# Patient Record
Sex: FEMALE | Race: WHITE | NOT HISPANIC OR LATINO | Employment: FULL TIME | URBAN - METROPOLITAN AREA
[De-identification: names, ages, dates, MRNs, and addresses within clinical notes are randomized per-mention and may not be internally consistent; named-entity substitution may affect disease eponyms.]

---

## 2024-01-24 ENCOUNTER — OFFICE VISIT (OUTPATIENT)
Dept: FAMILY MEDICINE CLINIC | Facility: CLINIC | Age: 54
End: 2024-01-24
Payer: COMMERCIAL

## 2024-01-24 VITALS
TEMPERATURE: 97.9 F | BODY MASS INDEX: 51.21 KG/M2 | HEIGHT: 63 IN | HEART RATE: 87 BPM | RESPIRATION RATE: 18 BRPM | OXYGEN SATURATION: 97 % | SYSTOLIC BLOOD PRESSURE: 140 MMHG | WEIGHT: 289 LBS | DIASTOLIC BLOOD PRESSURE: 94 MMHG

## 2024-01-24 DIAGNOSIS — E66.01 MORBID OBESITY WITH BMI OF 45.0-49.9, ADULT (HCC): ICD-10-CM

## 2024-01-24 DIAGNOSIS — K21.9 GASTROESOPHAGEAL REFLUX DISEASE WITHOUT ESOPHAGITIS: ICD-10-CM

## 2024-01-24 DIAGNOSIS — G89.29 CHRONIC LEFT-SIDED THORACIC BACK PAIN: Primary | ICD-10-CM

## 2024-01-24 DIAGNOSIS — E03.8 HYPOTHYROIDISM DUE TO HASHIMOTO'S THYROIDITIS: ICD-10-CM

## 2024-01-24 DIAGNOSIS — E06.3 HYPOTHYROIDISM DUE TO HASHIMOTO'S THYROIDITIS: ICD-10-CM

## 2024-01-24 DIAGNOSIS — M54.6 CHRONIC LEFT-SIDED THORACIC BACK PAIN: Primary | ICD-10-CM

## 2024-01-24 PROCEDURE — 99213 OFFICE O/P EST LOW 20 MIN: CPT | Performed by: NURSE PRACTITIONER

## 2024-01-24 RX ORDER — OMEPRAZOLE 20 MG/1
CAPSULE, DELAYED RELEASE ORAL
COMMUNITY

## 2024-01-24 NOTE — PROGRESS NOTES
FAMILY PRACTICE OFFICE VISIT       NAME: Екатерина Camacho  AGE: 53 y.o. SEX: female       : 1970        MRN: [id number]    DATE: 2024  TIME: 7:38 AM    Assessment and Plan   1. Chronic left-sided thoracic back pain  -     MRI thoracic spine wo contrast; Future; Expected date: 2024    2. Gastroesophageal reflux disease without esophagitis  -     Comprehensive metabolic panel; Future; Expected date: 2024  -     CBC and differential; Future; Expected date: 2024  -     TSH, 3rd generation with Free T4 reflex; Future; Expected date: 2024  -     Lipid Panel with Direct LDL reflex; Future    3. Hypothyroidism due to Hashimoto's thyroiditis  -     Comprehensive metabolic panel; Future; Expected date: 2024  -     CBC and differential; Future; Expected date: 2024  -     TSH, 3rd generation with Free T4 reflex; Future; Expected date: 2024  -     Lipid Panel with Direct LDL reflex; Future    4. Morbid obesity with BMI of 45.0-49.9, adult (HCC)  -     Comprehensive metabolic panel; Future; Expected date: 2024  -     CBC and differential; Future; Expected date: 2024  -     TSH, 3rd generation with Free T4 reflex; Future; Expected date: 2024  -     Lipid Panel with Direct LDL reflex; Future                 Chief Complaint     Chief Complaint   Patient presents with    Back Pain     Pt been seeing for upper back pain       History of Present Illness   Екатерина Camacho is a 53 y.o.-year-old female who is here for c/o left sided thoracic back pain  Has been happening for months  Feels sensation of burning pain as well  No known trauma or injury  Has seen chiropractor for this and no real relief  Would like labs done   Last mri showed disc disease  Has appt with pain management end of February for this      Review of Systems   Review of Systems   Constitutional:  Negative for fatigue and fever.   HENT:  Negative for congestion, nosebleeds and postnasal drip.   "  Cardiovascular:  Negative for chest pain and palpitations.   Gastrointestinal:  Negative for constipation, diarrhea, nausea and vomiting.   Musculoskeletal:  Positive for back pain. Negative for joint swelling.   Skin:  Negative for rash.   Neurological:  Negative for dizziness, light-headedness and headaches.   Hematological:  Negative for adenopathy.   Psychiatric/Behavioral:  Negative for dysphoric mood and sleep disturbance. The patient is not nervous/anxious.        Active Problem List     Patient Active Problem List   Diagnosis    Leg edema    Herpes simplex type 1 infection    Chronic bilateral low back pain without sciatica    Mild intermittent asthma    Gastroesophageal reflux disease without esophagitis    Morbid obesity with BMI of 45.0-49.9, adult ([facility])    Family history of colon cancer    Hypothyroidism due to Hashimoto's thyroiditis    Psoriasis    Ganglion of tendon sheath    Costovertebral angle pain    Herniated thoracic disc without myelopathy    Cold sore    History of COVID-19    Irritant contact dermatitis due to other agents    Chronic left-sided thoracic back pain         Past Medical History:  Past Medical History:   Diagnosis Date    Acute pain of right shoulder     [id number] RESOLVED//seen by pain management    Allergic     Anesthesia     \"sometimes wakes up before procedure done ie, with MRI and EGD\"    Anxiety     [id number]  LAST ASSESSED    Arthritis     left knee    Asthma     allergy induced    Bulging of intervertebral disc between L4 and L5     Bursitis of right knee     better now    Depression     Ear infection     left PCP placed pt on antibiotic and ear drops    Elevated AST (SGOT)     [id number] RESOLVED    GERD (gastroesophageal reflux disease)     Goiter     [id number] RESOLVED//pt denies states has \"inflamed thyroid\"    Hiatal hernia     Hypothyroidism     [id number] RESOLVED//not on meds    Lipoma     left scapular area    Lump of axilla, right     [id number] RESOLVED marker " "clip in rt breast    Obstructive sleep apnea     [id number] RESOLVED    Psoriasis     Rash     under bilat breasts and folds of abdomen,, pt reports Dr Shahana vela and dermatologist is treating/pt using ketoconazole cream as directed    Shortness of breath     \"with exertion\"    Use of cane as ambulatory aid     occas       Past Surgical History:  Past Surgical History:   Procedure Laterality Date    CARPAL TUNNEL RELEASE Bilateral     COLONOSCOPY      EGD      HYSTERECTOMY  12/30/2010    WITH ENDOMETERIAL ABLATION    KNEE SURGERY Left     NERVE BLOCK      PERIPH NERVE BLOCK WRIST MEDIAN W/LOCAL STEROID INFILTRAIT    PLANTAR FASCIA RELEASE Bilateral 2009, 2020 R    June 2020 on R    SC COLONOSCOPY FLX DX W/COLLJ SPEC WHEN PFRMD N/A 08/02/2018    Procedure: EGD AND COLONOSCOPY;  Surgeon: Mulugeta Gill MD;  Location: [facility];  Service: Gastroenterology    SC EXC B9 LESION MRGN XCP SK TG T/A/L 0.5 CM/< N/A 3/2/2023    Procedure: EXCISION WIDE LESION LEFT UPPER BACK;  Surgeon: Maya Chavez MD;  Location: [facility];  Service: General    SC FASCIECTOMY PLANTAR FASCIA PARTIAL SPX Right 06/09/2020    Procedure: RELEASE FASCIA PLANTAR/FASCIOTOMY ENDOSCOPIC (EPF);  Surgeon: Viraj Harkins DPM;  Location: [facility];  Service: Podiatry    SC RELEASE TARSAL TUNNEL Right 08/11/2020    Procedure: RELEASE TARSAL TUNNEL;  Surgeon: Viraj Harkins DPM;  Location[facility];  Service: Podiatry    TONSILLECTOMY AND ADENOIDECTOMY         Family History:  Family History   Problem Relation Age of Onset    Cirrhosis Mother     Hypertension Father     Nephrolithiasis Sister     Fibroids Sister     Anxiety disorder Daughter     Asthma Daughter     Heart disease Maternal Grandfather     Ovarian cancer Paternal Grandmother     Obesity Family     Uterine cancer Maternal Aunt     Fibroids Sister     Fibroids Sister     Ulcerative colitis Son        Social History:  Social History     Socioeconomic History    Marital status:  "     Spouse name: Not on file    Number of children: Not on file    Years of education: Not on file    Highest education level: Not on file   Occupational History    Not on file   Tobacco Use    Smoking status: Never    Smokeless tobacco: Never   Vaping Use    Vaping status: Never Used   Substance and Sexual Activity    Alcohol use: Yes     Comment: very rare (1 drink once per month - if that)    Drug use: No    Sexual activity: Not on file   Other Topics Concern    Not on file   Social History Narrative    DAILY COFFEE CONSUMPTION     Social Determinants of Health     Financial Resource Strain: Not on file   Food Insecurity: Not on file   Transportation Needs: Not on file   Physical Activity: Not on file   Stress: Not on file   Social Connections: Not on file   Intimate Partner Violence: Not on file   Housing Stability: Not on file       Objective     Vitals:    01/24/24 1519   BP: 140/94   Pulse: 87   Resp: 18   Temp: 97.9 °F (36.6 °C)   SpO2: 97%     Wt Readings from Last 3 Encounters:   01/24/24 131 kg (289 lb)   12/06/23 131 kg (289 lb)   11/07/23 133 kg (293 lb)       Physical Exam  Vitals and nursing note reviewed.   Constitutional:       Appearance: Normal appearance.   HENT:      Head: Normocephalic and atraumatic.   Eyes:      Conjunctiva/sclera: Conjunctivae normal.   Cardiovascular:      Rate and Rhythm: Normal rate and regular rhythm.      Heart sounds: Normal heart sounds.   Pulmonary:      Breath sounds: Normal breath sounds.   Musculoskeletal:         General: Normal range of motion.        Arms:       Cervical back: Normal range of motion and neck supple.      Comments: Pain and tenderness to left thoracic spine  No mass  No rash     Skin:     General: Skin is warm and dry.      Capillary Refill: Capillary refill takes less than 2 seconds.   Neurological:      Mental Status: She is alert and oriented to person, place, and time.   Psychiatric:         Mood and Affect: Mood normal.         Behavior:  "Behavior normal.         Pertinent Laboratory/Diagnostic Studies:  Lab Results   Component Value Date    GLUCOSE 106 12/12/2015    BUN 16 07/16/2023    CREATININE 0.86 07/16/2023    CALCIUM 9.6 07/16/2023     12/12/2015    K 3.8 07/16/2023    CO2 27 07/16/2023     07/16/2023     Lab Results   Component Value Date    ALT 17 07/16/2023    AST 18 07/16/2023    ALKPHOS 66 07/16/2023    BILITOT 0.52 12/12/2015       Lab Results   Component Value Date    WBC 10.00 07/16/2023    HGB 14.0 07/16/2023    HCT 43.6 07/16/2023    MCV 87 07/16/2023     07/16/2023       No results found for: \"TSH\"    Lab Results   Component Value Date    CHOL 184 12/12/2015     Lab Results   Component Value Date    TRIG 113 05/13/2023     Lab Results   Component Value Date    HDL 73 05/13/2023     Lab Results   Component Value Date    LDLCALC 96 05/13/2023     Lab Results   Component Value Date    HGBA1C 6.2 (H) 07/28/2014       Results for orders placed or performed in visit on 12/22/23   Poct Covid 19 Rapid Antigen Test   Result Value Ref Range    POCT SARS-CoV-2 Ag Negative Negative    VALID CONTROL Valid        Orders Placed This Encounter   Procedures    MRI thoracic spine wo contrast    Comprehensive metabolic panel    CBC and differential    TSH, 3rd generation with Free T4 reflex    Lipid Panel with Direct LDL reflex       ALLERGIES:  Allergies   Allergen Reactions    Chlorhexidine Rash       Current Medications     Current Outpatient Medications   Medication Sig Dispense Refill    acyclovir (ZOVIRAX) 5 % ointment Apply topically every 3 (three) hours 30 g 3    albuterol (2.5 mg/3 mL) 0.083 % nebulizer solution Take 3 mL (2.5 mg total) by nebulization every 4 (four) hours as needed for wheezing or shortness of breath 75 mL 1    budesonide-formoterol (SYMBICORT) 80-4.5 MCG/ACT inhaler Inhale 2 puffs 2 (two) times a day 10.2 g 3    guselkumab (Tremfya) subcutaneous injection       levothyroxine 150 mcg tablet TAKE 1 TABLET " BY MOUTH EVERY DAY 90 tablet 1    montelukast (SINGULAIR) 10 mg tablet TAKE 1 TABLET BY MOUTH DAILY AT BEDTIME 90 tablet 1    predniSONE 10 mg tablet Take 3 tablets daily for 3 days, then 2t daily for 3d then 1t daily for 3d 18 tablet 0    Proventil  (90 Base) MCG/ACT inhaler Inhale 2 puffs every 4 (four) hours as needed for wheezing 20.1 g 5    valACYclovir (VALTREX) 500 mg tablet TAKE 1 TABLET (500 MG TOTAL) BY MOUTH DAILY. 90 tablet 1    furosemide (LASIX) 20 mg tablet TAKE 1 TABLET BY MOUTH DAILY AS NEEDED (EDEMA). (Patient not taking: Reported on 1/24/2024) 90 tablet 1    Klor-Con M20 20 MEQ tablet TAKE 1 TABLET BY MOUTH EVERY DAY (Patient not taking: Reported on 8/1/2023) 90 tablet 2    omeprazole (PriLOSEC) 20 mg delayed release capsule TK ONE C PO QD GEF PRILOSEC      triamcinolone (KENALOG) 0.1 % cream Apply topically 2 (two) times a day (Patient not taking: Reported on 1/24/2024) 30 g 1     No current facility-administered medications for this visit.         Health Maintenance     Health Maintenance   Topic Date Due    Zoster Vaccine (1 of 2) Never done    DTaP,Tdap,and Td Vaccines (2 - Td or Tdap) 08/30/2022    Influenza Vaccine (1) 09/01/2023    COVID-19 Vaccine (2 - 2023-24 season) 09/01/2023    Annual Physical  08/01/2024    Breast Cancer Screening: Mammogram  08/22/2024    Depression Screening  01/24/2025    Colorectal Cancer Screening  08/01/2026    HIV Screening  Completed    Hepatitis C Screening  Completed    HIB Vaccine  Aged Out    IPV Vaccine  Aged Out    Hepatitis A Vaccine  Aged Out    Meningococcal ACWY Vaccine  Aged Out    HPV Vaccine  Aged Out    Pneumococcal Vaccine: Pediatrics (0 to 5 Years) and At-Risk Patients (6 to 64 Years)  Discontinued     Immunization History   Administered Date(s) Administered    COVID-19 [employer]J ([employer]) vaccine 0.5 mL 03/19/2021    INFLUENZA 12/22/2015, 10/17/2016, 12/04/2017, 11/12/2018    Influenza Quadrivalent Preservative Free 3 years and older IM  12/31/2014, 10/17/2016, 12/04/2017    Influenza Quadrivalent, 6-35 Months IM 12/30/2014, 12/22/2015    Influenza, injectable, quadrivalent, preservative free 0.5 mL 10/31/2019    Influenza, recombinant, quadrivalent,injectable, preservative free 11/12/2018, 12/08/2020, 12/09/2021, 11/16/2022    Influenza, seasonal, injectable 10/20/2012, 12/08/2021    Tdap 08/30/2012          THEO Lewis

## 2024-01-25 PROBLEM — R07.89 OTHER CHEST PAIN: Status: RESOLVED | Noted: 2018-04-19 | Resolved: 2024-01-25

## 2024-01-25 PROBLEM — M79.89 LEFT LEG SWELLING: Status: RESOLVED | Noted: 2023-06-09 | Resolved: 2024-01-25

## 2024-01-25 PROBLEM — R10.13 EPIGASTRIC PAIN: Status: RESOLVED | Noted: 2023-08-01 | Resolved: 2024-01-25

## 2024-01-25 PROBLEM — M54.6 CHRONIC RIGHT-SIDED THORACIC BACK PAIN: Status: ACTIVE | Noted: 2024-01-25

## 2024-01-25 PROBLEM — M79.605 PAIN OF LEFT LOWER EXTREMITY: Status: RESOLVED | Noted: 2023-06-09 | Resolved: 2024-01-25

## 2024-01-25 PROBLEM — D17.1 LIPOMA OF TORSO: Status: RESOLVED | Noted: 2021-07-28 | Resolved: 2024-01-25

## 2024-01-25 PROBLEM — N64.4 BREAST PAIN, LEFT: Status: RESOLVED | Noted: 2023-06-09 | Resolved: 2024-01-25

## 2024-01-25 PROBLEM — F41.9 ANXIETY: Status: RESOLVED | Noted: 2018-06-18 | Resolved: 2024-01-25

## 2024-01-25 PROBLEM — G89.29 CHRONIC RIGHT-SIDED THORACIC BACK PAIN: Status: ACTIVE | Noted: 2024-01-25

## 2024-02-12 ENCOUNTER — ANESTHESIA EVENT (OUTPATIENT)
Dept: RADIOLOGY | Facility: HOSPITAL | Age: 54
End: 2024-02-12

## 2024-02-12 PROBLEM — Z90.710 HISTORY OF HYSTERECTOMY: Status: ACTIVE | Noted: 2024-02-12

## 2024-02-12 NOTE — ANESTHESIA PREPROCEDURE EVALUATION
Procedure:  MRI THORACIC SPINE WO CONTRAST    Relevant Problems   CARDIO   (+) Chronic left-sided thoracic back pain      ENDO   (+) Hypothyroidism due to Hashimoto's thyroiditis      GI/HEPATIC   (+) Gastroesophageal reflux disease without esophagitis      GYN   (+) History of hysterectomy      MUSCULOSKELETAL   (+) Chronic bilateral low back pain without sciatica   (+) Chronic left-sided thoracic back pain   (+) Costovertebral angle pain      NEURO/PSYCH   (+) Anxiety   (+) Chronic bilateral low back pain without sciatica   (+) Chronic left-sided thoracic back pain      PULMONARY   (+) Mild intermittent asthma      Musculoskeletal and Integument   (+) Herniated thoracic disc without myelopathy      Other   (+) Morbid obesity with BMI of 45.0-49.9, adult (HCC)        Physical Exam    Airway       Dental       Cardiovascular      Pulmonary      Other Findings  post-pubertal.      Anesthesia Plan  ASA Score- 3     Anesthesia Type- IV sedation with anesthesia with ASA Monitors.         Additional Monitors:     Airway Plan:     Comment: Pre-treat with gylco 0.2mg, Versed, Ketamine and light propofol infusion recommended.       Plan Factors-    Chart reviewed.        Patient is not a current smoker.              Induction- intravenous.    Postoperative Plan-     Informed Consent-   I personally reviewed this patient with the CRNA. Discussed and agreed on the Anesthesia Plan with the CRNA..

## 2024-02-14 ENCOUNTER — HOSPITAL ENCOUNTER (OUTPATIENT)
Dept: RADIOLOGY | Facility: HOSPITAL | Age: 54
Discharge: HOME | End: 2024-02-14
Payer: COMMERCIAL

## 2024-02-14 ENCOUNTER — ANESTHESIA (OUTPATIENT)
Dept: RADIOLOGY | Facility: HOSPITAL | Age: 54
End: 2024-02-14

## 2024-02-14 VITALS
HEART RATE: 73 BPM | TEMPERATURE: 98.6 F | DIASTOLIC BLOOD PRESSURE: 88 MMHG | RESPIRATION RATE: 16 BRPM | OXYGEN SATURATION: 96 % | SYSTOLIC BLOOD PRESSURE: 148 MMHG

## 2024-02-14 DIAGNOSIS — G89.29 CHRONIC LEFT-SIDED THORACIC BACK PAIN: ICD-10-CM

## 2024-02-14 DIAGNOSIS — M54.6 CHRONIC LEFT-SIDED THORACIC BACK PAIN: ICD-10-CM

## 2024-02-14 PROCEDURE — 72146 MRI CHEST SPINE W/O DYE: CPT

## 2024-02-14 PROCEDURE — G1004 CDSM NDSC: HCPCS

## 2024-02-14 RX ORDER — KETAMINE HCL IN NACL, ISO-OSM 100MG/10ML
SYRINGE (ML) INJECTION AS NEEDED
Status: DISCONTINUED | OUTPATIENT
Start: 2024-02-14 | End: 2024-02-14

## 2024-02-14 RX ORDER — MIDAZOLAM HYDROCHLORIDE 2 MG/2ML
INJECTION, SOLUTION INTRAMUSCULAR; INTRAVENOUS AS NEEDED
Status: DISCONTINUED | OUTPATIENT
Start: 2024-02-14 | End: 2024-02-14

## 2024-02-14 RX ORDER — SODIUM CHLORIDE 9 MG/ML
INJECTION, SOLUTION INTRAVENOUS CONTINUOUS PRN
Status: DISCONTINUED | OUTPATIENT
Start: 2024-02-14 | End: 2024-02-14

## 2024-02-14 RX ORDER — PROPOFOL 10 MG/ML
INJECTION, EMULSION INTRAVENOUS CONTINUOUS PRN
Status: DISCONTINUED | OUTPATIENT
Start: 2024-02-14 | End: 2024-02-14

## 2024-02-14 RX ORDER — SODIUM CHLORIDE, SODIUM LACTATE, POTASSIUM CHLORIDE, CALCIUM CHLORIDE 600; 310; 30; 20 MG/100ML; MG/100ML; MG/100ML; MG/100ML
75 INJECTION, SOLUTION INTRAVENOUS CONTINUOUS
Status: DISCONTINUED | OUTPATIENT
Start: 2024-02-14 | End: 2024-02-15 | Stop reason: HOSPADM

## 2024-02-14 RX ADMIN — MIDAZOLAM 2 MG: 1 INJECTION INTRAMUSCULAR; INTRAVENOUS at 12:37

## 2024-02-14 RX ADMIN — SODIUM CHLORIDE, SODIUM LACTATE, POTASSIUM CHLORIDE, AND CALCIUM CHLORIDE 75 ML/HR: .6; .31; .03; .02 INJECTION, SOLUTION INTRAVENOUS at 11:57

## 2024-02-14 RX ADMIN — Medication 30 MG: at 12:37

## 2024-02-14 RX ADMIN — PROPOFOL 50 MCG/KG/MIN: 10 INJECTION, EMULSION INTRAVENOUS at 12:41

## 2024-02-14 RX ADMIN — SODIUM CHLORIDE: 0.9 INJECTION, SOLUTION INTRAVENOUS at 11:56

## 2024-02-14 NOTE — ANESTHESIA POSTPROCEDURE EVALUATION
Post-Op Assessment Note    CV Status:  Stable  Pain Score: 0    Pain management: adequate       Mental Status:  Sleepy and arousable   Hydration Status:  Euvolemic   PONV Controlled:  Controlled   Airway Patency:  Patent     Post Op Vitals Reviewed: Yes    No anethesia notable event occurred.    Staff: Anesthesiologist, CRNA               /81 (02/14/24 1320)    Temp      Pulse 79 (02/14/24 1320)   Resp 12 (02/14/24 1320)    SpO2 100 % (02/14/24 1320)

## 2024-02-20 DIAGNOSIS — E06.3 HYPOTHYROIDISM DUE TO HASHIMOTO'S THYROIDITIS: ICD-10-CM

## 2024-02-20 DIAGNOSIS — E03.8 HYPOTHYROIDISM DUE TO HASHIMOTO'S THYROIDITIS: ICD-10-CM

## 2024-02-20 RX ORDER — LEVOTHYROXINE SODIUM 0.15 MG/1
150 TABLET ORAL DAILY
Qty: 90 TABLET | Refills: 1 | Status: SHIPPED | OUTPATIENT
Start: 2024-02-20

## 2024-02-29 ENCOUNTER — CONSULT (OUTPATIENT)
Dept: PAIN MEDICINE | Facility: CLINIC | Age: 54
End: 2024-02-29
Payer: COMMERCIAL

## 2024-02-29 ENCOUNTER — HOSPITAL ENCOUNTER (OUTPATIENT)
Dept: RADIOLOGY | Facility: HOSPITAL | Age: 54
Discharge: HOME | End: 2024-02-29
Payer: COMMERCIAL

## 2024-02-29 VITALS
RESPIRATION RATE: 16 BRPM | WEIGHT: 291 LBS | HEIGHT: 63 IN | SYSTOLIC BLOOD PRESSURE: 133 MMHG | BODY MASS INDEX: 51.56 KG/M2 | DIASTOLIC BLOOD PRESSURE: 94 MMHG | HEART RATE: 97 BPM

## 2024-02-29 DIAGNOSIS — R10.32 LEFT INGUINAL PAIN: ICD-10-CM

## 2024-02-29 DIAGNOSIS — M25.552 PAIN OF LEFT HIP: ICD-10-CM

## 2024-02-29 DIAGNOSIS — M25.552 PAIN OF LEFT HIP: Primary | ICD-10-CM

## 2024-02-29 PROCEDURE — 73502 X-RAY EXAM HIP UNI 2-3 VIEWS: CPT

## 2024-02-29 PROCEDURE — 99204 OFFICE O/P NEW MOD 45 MIN: CPT | Performed by: PHYSICAL MEDICINE & REHABILITATION

## 2024-02-29 PROCEDURE — 72220 X-RAY EXAM SACRUM TAILBONE: CPT

## 2024-02-29 NOTE — PROGRESS NOTES
Assessment  1. Pain of left hip    2. Left inguinal pain        Plan  Ms. Camacho is a pleasant 53-year-old female presents to [facility] for initial evaluation regarding isolated mid thoracic pain as well as low back and groin pain.  She has a known history of a lipoma resection that she reports burning, throbbing, neuropathic type pain left-sided thoracic paraspinals that started after the resection.  During today's evaluation she is demonstrating isolated thoracic radiculopathy and neuropathic pain with a known MRI thoracic spine results demonstrating T7-T8 left paracentral disc protrusion likely contributing and questionable if associated with the previous resection.  In regards to the low back and groin pain she is demonstrating suspected hip pathology but no conservative measures have been done.  At this time we will  1.  Placed the patient in a formal physical therapy program x 4 weeks or longer if needed  2.  Order updated hip and sacral x-rays  3.  Will reach out to compound pharmacy to see if they would be able to build a compound topical ointment for her to try over the affected area.  We did discuss thoracic epidurals, medial branch blocks and subsequent RFA but for now we will hold off on interventional approaches to manage her pain.    Spoke to [facility] pharmacy and will plan for compound agent  Ketoprofen 10%,  lidocaine 5%, cyclobenzaprine 2%, Gabapentin 6% and amitryptiline 3%. 2 grams to be used up to 4 times a day    My impressions and treatment recommendations were discussed in detail with the patient who verbalized understanding and had no further questions.  Discharge instructions were provided. I personally saw and examined the patient and I agree with the above discussed plan of care.    Orders Placed This Encounter   Procedures    XR hip/pelv 2-3 vws left if performed     Standing Status:   Future     Standing Expiration Date:   2/29/2028     Scheduling  Instructions:      Bring along any outside films relating to this procedure.           Order Specific Question:   Is the patient pregnant?     Answer:   No    XR sacrum and coccyx     Standing Status:   Future     Standing Expiration Date:   2/29/2028     Scheduling Instructions:      Bring along any outside films relating to this procedure.           Order Specific Question:   Is the patient pregnant?     Answer:   No    Ambulatory referral to Physical Therapy     Standing Status:   Future     Standing Expiration Date:   2/28/2025     Referral Priority:   Routine     Referral Type:   Physical Therapy     Referral Reason:   Specialty Services Required     Requested Specialty:   Physical Therapy     Number of Visits Requested:   1     Expiration Date:   2/28/2025     No orders of the defined types were placed in this encounter.      History of Present Illness    Екатерина Camacho is a 53 y.o. female presents to [facility] for initial evaluation regarding 3 months duration of isolated left shoulder and left low back, buttock and hip pain.  Denies any significant inciting event or recent trauma.  Today reports moderate to severe pain rated 8 out of 10 and interfere with activities.  Pain is nearly constant 60 to 95% of the time that is present throughout the day and night.  Describes symptoms of burning, shooting, sharp, throbbing, pins-and-needles, cutting pain.  Denies any significant upper or lower extremity weakness.  Does not use any durable medical record for ambulation.  Symptoms are worse with exercise.  Denies any relief with physical therapy but does report moderate relief with heat and TENS unit.  Denies smoking or marijuana use.  Admits to alcohol twice per month.  Currently taking Motrin with no relief.  Presents today for initial evaluation.    I have personally reviewed and/or updated the patient's past medical history, past surgical history, family history, social history, current  "medications, allergies, and vital signs today.     Review of Systems   Constitutional:  Negative for fever and unexpected weight change.   HENT:  Negative for trouble swallowing.    Eyes:  Negative for visual disturbance.   Respiratory:  Negative for shortness of breath and wheezing.    Cardiovascular:  Negative for chest pain and palpitations.   Gastrointestinal:  Negative for constipation, diarrhea, nausea and vomiting.   Endocrine: Negative for cold intolerance, heat intolerance and polydipsia.   Genitourinary:  Negative for difficulty urinating and frequency.   Musculoskeletal:  Positive for back pain, gait problem and joint swelling. Negative for arthralgias and myalgias.   Skin:  Negative for rash.   Neurological:  Negative for dizziness, seizures, syncope, weakness and headaches.   Hematological:  Does not bruise/bleed easily.   Psychiatric/Behavioral:  Negative for dysphoric mood.    All other systems reviewed and are negative.      Patient Active Problem List   Diagnosis    Leg edema    Herpes simplex type 1 infection    Chronic bilateral low back pain without sciatica    Mild intermittent asthma    Gastroesophageal reflux disease without esophagitis    Anxiety    Morbid obesity with BMI of 45.0-49.9, adult (HCC)    Family history of colon cancer    Hypothyroidism due to Hashimoto's thyroiditis    Psoriasis    Ganglion of tendon sheath    Costovertebral angle pain    Herniated thoracic disc without myelopathy    Cold sore    History of COVID-19    Irritant contact dermatitis due to other agents    Chronic left-sided thoracic back pain    History of hysterectomy       Past Medical History:   Diagnosis Date    Acute pain of right shoulder     [id number] RESOLVED//seen by pain management    Allergic     Anesthesia     \"sometimes wakes up before procedure done ie, with MRI and EGD\"    Anxiety     [id number]  LAST ASSESSED    Arthritis     left knee    Asthma     allergy induced    Bulging of intervertebral disc " "between L4 and L5     Bursitis of right knee     better now    Depression     Ear infection     left PCP placed pt on antibiotic and ear drops    Elevated AST (SGOT)     [id number] RESOLVED    GERD (gastroesophageal reflux disease)     Goiter     [id number] RESOLVED//pt denies states has \"inflamed thyroid\"    Hiatal hernia     Hypothyroidism     [id number] RESOLVED//not on meds    Lipoma     left scapular area    Lump of axilla, right     [id number] RESOLVED marker clip in rt breast    Obstructive sleep apnea     [id number] RESOLVED    Psoriasis     Rash     under bilat breasts and folds of abdomen,, pt reports Dr Harkins aware and dermatologist is treating/pt using ketoconazole cream as directed    Shortness of breath     \"with exertion\"    Use of cane as ambulatory aid     occas       Past Surgical History:   Procedure Laterality Date    CARPAL TUNNEL RELEASE Bilateral     COLONOSCOPY      EGD      HYSTERECTOMY  12/30/2010    WITH ENDOMETERIAL ABLATION    KNEE SURGERY Left     NERVE BLOCK      PERIPH NERVE BLOCK WRIST MEDIAN W/LOCAL STEROID INFILTRAIT    PLANTAR FASCIA RELEASE Bilateral 2009, 2020 R    June 2020 on R    MT COLONOSCOPY FLX DX W/COLLJ SPEC WHEN PFRMD N/A 08/02/2018    Procedure: EGD AND COLONOSCOPY;  Surgeon: Mulugeta Gill MD;  Location: [facility];  Service: Gastroenterology    MT EXC B9 LESION MRGN XCP SK TG T/A/L 0.5 CM/< N/A 3/2/2023    Procedure: EXCISION WIDE LESION LEFT UPPER BACK;  Surgeon: Maya Chavez MD;  Location: [facility];  Service: General    MT FASCIECTOMY PLANTAR FASCIA PARTIAL SPX Right 06/09/2020    Procedure: RELEASE FASCIA PLANTAR/FASCIOTOMY ENDOSCOPIC (EPF);  Surgeon: Viraj Harkins DPM;  Location: [facility];  Service: Podiatry    MT RELEASE TARSAL TUNNEL Right 08/11/2020    Procedure: RELEASE TARSAL TUNNEL;  Surgeon: Viraj Harkins DPM;  Location: [facility];  Service: Podiatry    TONSILLECTOMY AND ADENOIDECTOMY         Family History   Problem Relation Age of Onset    " Cirrhosis Mother     Hypertension Father     Nephrolithiasis Sister     Fibroids Sister     Anxiety disorder Daughter     Asthma Daughter     Heart disease Maternal Grandfather     Ovarian cancer Paternal Grandmother     Obesity Family     Uterine cancer Maternal Aunt     Fibroids Sister     Fibroids Sister     Ulcerative colitis Son        Social History     Occupational History    Not on file   Tobacco Use    Smoking status: Never     Passive exposure: Never    Smokeless tobacco: Never   Vaping Use    Vaping status: Never Used   Substance and Sexual Activity    Alcohol use: Yes     Comment: very rare (1 drink once per month - if that)    Drug use: No    Sexual activity: Not on file       Current Outpatient Medications on File Prior to Visit   Medication Sig    acyclovir (ZOVIRAX) 5 % ointment Apply topically every 3 (three) hours    albuterol (2.5 mg/3 mL) 0.083 % nebulizer solution Take 3 mL (2.5 mg total) by nebulization every 4 (four) hours as needed for wheezing or shortness of breath    budesonide-formoterol (SYMBICORT) 80-4.5 MCG/ACT inhaler Inhale 2 puffs 2 (two) times a day    guselkumab (Tremfya) subcutaneous injection     levothyroxine 150 mcg tablet TAKE 1 TABLET BY MOUTH EVERY DAY    montelukast (SINGULAIR) 10 mg tablet TAKE 1 TABLET BY MOUTH DAILY AT BEDTIME    Proventil  (90 Base) MCG/ACT inhaler Inhale 2 puffs every 4 (four) hours as needed for wheezing    triamcinolone (KENALOG) 0.1 % cream Apply topically 2 (two) times a day    valACYclovir (VALTREX) 500 mg tablet TAKE 1 TABLET (500 MG TOTAL) BY MOUTH DAILY.    furosemide (LASIX) 20 mg tablet TAKE 1 TABLET BY MOUTH DAILY AS NEEDED (EDEMA). (Patient not taking: Reported on 1/24/2024)    Klor-Con M20 20 MEQ tablet TAKE 1 TABLET BY MOUTH EVERY DAY (Patient not taking: Reported on 8/1/2023)     No current facility-administered medications on file prior to visit.       Allergies   Allergen Reactions    Chlorhexidine Rash       Physical  "Exam    /94   Pulse 97   Resp 16   Ht 5' 3\" (1.6 m)   Wt 132 kg (291 lb)   BMI 51.55 kg/m²     Constitutional: normal, well developed, well nourished, alert, in no distress and non-toxic and no overt pain behavior.  Eyes: anicteric  HEENT: grossly intact  Neck: supple, symmetric, trachea midline and no masses   Pulmonary:even and unlabored  Cardiovascular:No edema or pitting edema present  Skin:Normal without rashes or lesions and well hydrated  Psychiatric:Mood and affect appropriate  Neurologic:Cranial Nerves II-XII grossly intact  Musculoskeletal:normal, tenderness to palpation left-sided mid thoracic paraspinals, incision lipoma clean dry and intact, tenderness to palpation left lateral groin, lumbar paraspinals and distal to PSIS, positive internal logroll with pain palpated left groin, positive scour test, MMT 5 out of 5 bilateral lower extremities, sensation grossly tact to light touch    Imaging  MRI THORACIC SPINE WITHOUT CONTRAST     INDICATION: M54.6: Pain in thoracic spine  G89.29: Other chronic pain.     COMPARISON:  None.     TECHNIQUE:  Multiplanar, multisequence imaging of the thoracic spine was performed. .     IMAGE QUALITY: Diagnostic.     FINDINGS:     ALIGNMENT: Normal alignment of the thoracic spine.  No compression fracture.  No subluxation.  No scoliosis.     MARROW SIGNAL:  Normal marrow signal is identified within the visualized bony structures.  No discrete marrow lesion.     THORACIC CORD: Normal signal within the thoracic cord.     PARAVERTEBRAL SOFT TISSUES:  Normal.     THORACIC DEGENERATIVE CHANGE: At T6-7 there is a right paracentral disc protrusion with slight cord indentation but no myelomalacia or foraminal stenosis.     At T7-8 there is disc desiccation with mild loss of disc height. There is a left paracentral disc protrusion with slight indentation upon the cord but no myelomalacia. Foramina remain patent at this level.     At T8-9 there is a diffuse disc bulge with " superimposed right paracentral disc extrusion exhibiting caudal migration and moderate cord flattening with a tiny focus of cord signal abnormality likely reflecting cord myelomalacia. Foramina remain patent.     OTHER FINDINGS:  None.     IMPRESSION:  Mild to moderate spondylotic changes of the midthoracic spine. No critical stenosis. Foramina are patent at all thoracic levels.        Workstation performed: [id number]

## 2024-03-01 ENCOUNTER — TELEPHONE (OUTPATIENT)
Dept: PAIN MEDICINE | Facility: CLINIC | Age: 54
End: 2024-03-01

## 2024-03-01 NOTE — TELEPHONE ENCOUNTER
S/w the patient and reviewed. She starts PT on Tuesday and she will give it a month, if no better or if pain gets worse. She will call back.

## 2024-03-01 NOTE — TELEPHONE ENCOUNTER
----- Message from Carlos Mas DO sent at 3/1/2024  8:22 AM EST -----  Please notify patient left hip x-ray demonstrating mild left hip OA  We just placed her in a formal physical therapy program and she may continue with conservative measures first but if she does not have any significant improvements would consider a left hip steroid injection under fluoroscopy guidance  Please advise and if/when she is interested we can schedule  Thank you  ----- Message -----  From: Ronna, Radiology Results In  Sent: 2/29/2024   2:26 PM EST  To: Carlos Mas DO

## 2024-03-02 ENCOUNTER — APPOINTMENT (OUTPATIENT)
Dept: LAB | Facility: HOSPITAL | Age: 54
End: 2024-03-02
Payer: COMMERCIAL

## 2024-03-02 DIAGNOSIS — E66.01 MORBID OBESITY WITH BMI OF 45.0-49.9, ADULT (HCC): ICD-10-CM

## 2024-03-02 DIAGNOSIS — K21.9 GASTROESOPHAGEAL REFLUX DISEASE WITHOUT ESOPHAGITIS: ICD-10-CM

## 2024-03-02 DIAGNOSIS — E03.8 HYPOTHYROIDISM DUE TO HASHIMOTO'S THYROIDITIS: ICD-10-CM

## 2024-03-02 DIAGNOSIS — E06.3 HYPOTHYROIDISM DUE TO HASHIMOTO'S THYROIDITIS: ICD-10-CM

## 2024-03-02 LAB
CHOLEST SERPL-MCNC: 198 MG/DL
HDLC SERPL-MCNC: 56 MG/DL
LDLC SERPL CALC-MCNC: 108 MG/DL (ref 0–100)
TRIGL SERPL-MCNC: 172 MG/DL

## 2024-03-02 PROCEDURE — 80061 LIPID PANEL: CPT

## 2024-03-02 PROCEDURE — 36415 COLL VENOUS BLD VENIPUNCTURE: CPT

## 2024-03-04 DIAGNOSIS — K21.9 GASTROESOPHAGEAL REFLUX DISEASE WITHOUT ESOPHAGITIS: ICD-10-CM

## 2024-03-04 DIAGNOSIS — E06.3 HYPOTHYROIDISM DUE TO HASHIMOTO'S THYROIDITIS: Primary | ICD-10-CM

## 2024-03-04 DIAGNOSIS — E03.8 HYPOTHYROIDISM DUE TO HASHIMOTO'S THYROIDITIS: Primary | ICD-10-CM

## 2024-03-04 DIAGNOSIS — E66.01 MORBID OBESITY WITH BMI OF 45.0-49.9, ADULT (HCC): ICD-10-CM

## 2024-03-12 ENCOUNTER — EVALUATION (OUTPATIENT)
Dept: PHYSICAL THERAPY | Facility: CLINIC | Age: 54
End: 2024-03-12
Payer: COMMERCIAL

## 2024-03-12 DIAGNOSIS — M25.552 PAIN OF LEFT HIP: ICD-10-CM

## 2024-03-12 DIAGNOSIS — R10.32 LEFT INGUINAL PAIN: ICD-10-CM

## 2024-03-12 PROCEDURE — 97161 PT EVAL LOW COMPLEX 20 MIN: CPT

## 2024-03-12 NOTE — PROGRESS NOTES
PT Evaluation     Today's date: 3/12/2024  Patient name: Екатерина Camacho  : 1970  MRN: [id number]  Referring provider: Carlos Mas DO  Dx:   Encounter Diagnosis     ICD-10-CM    1. Pain of left hip  M25.552 Ambulatory referral to Physical Therapy      2. Left inguinal pain  R10.32 Ambulatory referral to Physical Therapy                     Assessment  Assessment details: Екатерина Camacho is a 53 y.o. female who presents to hospital-based outpatient PT with left hip pain that radiates to the left medial ankle and left inguinal pain. Aggravating factors include activities requiring prolonged sitting or lifting the hip from a flexed position. Patient presents with the following impairments: radicular pain from left hip to left groin and left ankle, limited lumbar AROM, limited left hip and knee strength, and postural dysfunction. Due to these impairments, patient has difficulty performing the following: prolonged sitting, sitting in a recliner, lifting the left hip, and getting in/out of the car. Patient has been educated in home exercise program and plan of care. Patient was also educated in use of a lumbar roll for support while seated. Patient would benefit from skilled physical therapy services to address the above functional limitations and progress towards prior level of function and independence with home exercise program.  Impairments: abnormal muscle firing, abnormal or restricted ROM, activity intolerance, impaired physical strength, lacks appropriate home exercise program, pain with function, poor posture  and poor body mechanics  Understanding of Dx/Px/POC: good   Prognosis: good    Goals  Short Term Goals [3 weeks; target date: 24]  1. Patient will be independent with initial HEP.  2. Patient will demonstrate an increase in lumbar AROM by 10% in all planes.   3. Patient will demonstrate an increase in LLE strength of 1/2 grade on MMT.    Long Term Goals [6 weeks; target date: 24]  1.  Patient will be independent with comprehensive HEP.   2. Patient will demonstrate an increase in lumbar AROM to WNL in order to promote self-care activities pain-free.  3. Patient will demonstrate an increase in B/L LE strength to 5/5 on MMT.   4. Patient will be able to perform a full, functional squat with proper mechanics.   5. Patient will be able to get in/out of the car with left hip pain of 2/10 at worst.       Plan  Plan details: Patient has verbalized understanding and agreement with insurance verification form.   Patient would benefit from: skilled physical therapy  Planned modality interventions: cryotherapy, electrical stimulation/Russian stimulation, TENS, ultrasound, thermotherapy: hydrocollator packs, unattended electrical stimulation, high voltage pulsed current: pain management and high voltage pulsed current: spasm management  Planned therapy interventions: flexibility, functional ROM exercises, graded exercise, home exercise program, joint mobilization, manual therapy, neuromuscular re-education, patient education, strengthening, stretching, therapeutic exercise, therapeutic activities, Badillo taping, balance/weight bearing training, gait training, abdominal trunk stabilization, activity modification, balance, body mechanics training, graded activity, self care, postural training, IADL retraining, ADL training, breathing training, behavior modification, muscle pump exercises, therapeutic training and transfer training  Frequency: 2x week  Duration in weeks: 6  Plan of Care beginning date: 3/12/2024  Plan of Care expiration date: 6/12/2024  Treatment plan discussed with: patient    Subjective Evaluation    History of Present Illness  Date of onset: 11/12/2023  Mechanism of injury: Pt reports pain from the left hip down to the medial left heel that began about 4 months ago. Pt states that she has been previously diagnosed with herniated discs in L4-L5 and L5-S1. Pt states that she has been  "packing and organizing at home since 2023 as she is preparing to sell her home. Pt states that sitting in her desk chair and in the car make it worse. Pt states that her left heel pain is \"burning.\" Pt describes left hip \"stabbing\" pain in the left inguinal area. Pt states that she struggles to  her left leg while seated. Pt states she saw spine and pain management and x-rays were taken, which were normal and showed mild OA in the left hip. Pt was referred to PT.   Patient Goals  Patient goals for therapy: decreased pain  Patient goal: To be independent with gym exercise  Pain  Current pain ratin  At best pain ratin  At worst pain rating: 10 (While sitting)  Location: Left hip/groin  Quality: burning (\"stabbing\")  Alleviating factors: Standing, stretching.  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   Red Flag Screening  (+) for age >50  (-) for unexplained weight loss  (-) for history of cancer  (-) for saddle paresthesia  (-) for bladder/bowel dysfunction    Neurological Testing  Light Touch Sensation - intact throughout BLE    Lumbar AROM     Flexion  Fingertip reach to floor   Extension 60% w/ tightness in center lumbar   L side glide 80% w/ increased pain in left glute   R side glide 75%  w/ increased pain in left glute    L rotation WFL   R rotation  WFL     Repeated motions Position Repetitions performed Symptomatic response during Symptomatic response after   Flexion Standing 10   No effect No effect   Extension Standing 10       Comments:     Lower Extremity Strength Testing    Hip MMT  Left  Right   Flexion  3+/5 5/5    Abduction 4/5 5/5   Adduction 4/5 5/5     Knee MMT  " Left  Right   Flexion  4/5 5/5    Extension 4+/5 5/5     Ankle MMT  Left  Right   Dorsiflexion  5/5 5/5    Plantarflexion 5/5 5/5              Insurance:  AMA/CMS Eval/ Re-eval POC expires FOTO Auth Status Co-Insurance   CMS - BC BS 3/12/24 6/12/24  Requesting No                                        1.  3/12 2.  3.  4.  5.  6.    7.  8.  9.  10.  11.  12.    13.  14.  15.  16.  17.  18.    19.  20. 21. 22. 23. 24.    25.  26. 27. 28. 29. 30.   31. 32.  33. 34. 35. 36.        Precautions: Standard       EVAL 2 3 4 5 6   Manuals 3/12/24        STM/MFR         Manual flexibility hip         Joint mobs          Neuro Re-Ed         Quad sets         Glute sets         SLR         Clamshells         Bridging         SLS                  Ther Ex         Prone press ups 10x         BETHANY 10x         Hip flexor stretch                                                               Ther Activity         Pt education POC, HEP        Stairs         Squats         Gait                                    Modalities                             Access Code: [email or web address]  URL: [email or web address]  Date: 03/12/2024  Prepared by: Carl Khan    Exercises  - Prone Press Up  - 6 x daily - 7 x weekly - 1 sets - 10 reps - 1-2 hold  - Standing Lumbar Extension with Counter  - 6 x daily - 7 x weekly - 1 sets - 10 reps - 1-2 hold

## 2024-03-19 ENCOUNTER — OFFICE VISIT (OUTPATIENT)
Dept: PHYSICAL THERAPY | Facility: CLINIC | Age: 54
End: 2024-03-19
Payer: COMMERCIAL

## 2024-03-19 DIAGNOSIS — R10.32 LEFT INGUINAL PAIN: ICD-10-CM

## 2024-03-19 DIAGNOSIS — M25.552 PAIN OF LEFT HIP: Primary | ICD-10-CM

## 2024-03-19 PROCEDURE — 97110 THERAPEUTIC EXERCISES: CPT

## 2024-03-19 PROCEDURE — 97112 NEUROMUSCULAR REEDUCATION: CPT

## 2024-03-19 NOTE — PROGRESS NOTES
Daily Note      Today's date: 3/19/2024  Patient name: Екатерина Camacho  : 1970  MRN: [id number]  Referring provider: Carlos Mas DO  Dx:   Encounter Diagnosis     ICD-10-CM    1. Pain of left hip  M25.552       2. Left inguinal pain  R10.32           Subjective: Pt reports no pain in the back, hip, or groin today. Pt states that prior to starting PT, it was rare for her to have no pain. Pt states the standing backward bends have been effective in managing her symptoms while working.        Objective: See treatment diary below    Assessment: Pt tolerated treatment well.  Pt noted symptoms throughout treatment today. Pt introduced to TA activation progressions and noted onset of cramping in the R hamstrings with bridging. Pt noted improvement with rest and was able to continue this exercise without cramping. Pt also noted fatigue of the hip adductors with ball squeeze.     Plan: Continue per plan of care.  Progress treatment as tolerated.          Insurance:  AMA/CMS Eval/ Re-eval POC expires FOTO Auth Status Co-Insurance   CMS - BC BS 3/12/24 6/12/24  Approved   12 Visits   Auth [id number]  3/12/24-24  No                                        1.  3/12 2.   3/19 3.  4.  5.  6.    7.  8.  9.  10.  11.  12.    13.  14.  15.  16.  17.  18.    19.  20. 21. 22. 23. 24.    25.  26. 27. 28. 29. 30.   31. 32.  33. 34. 35. 36.        Precautions: Standard       EVAL 2 3 4 5 6   Manuals 3/12/24 3/19/24       STM/MFR         Manual flexibility hip         Joint mobs          Neuro Re-Ed         TA activation  2x10 (5s) w/ self-palpation       TA w/ hip add  2x10 (5s)        TA w/ bridging  2x10 (3s)        TA w/ clamshells  20x blue TB                                  Ther Ex           Prone press ups 10x         BETHANY 10x  10x       Hip flexor stretch  5x15s B/L supine w/ SOS                                                               Ther Activity         Pt education POC, HEP Reviewed HEP       Stairs          Squats         Gait                                    Modalities                             Access Code: [email or web address]  URL: [email or web address]  Date: 03/12/2024  Prepared by: Carl Khan    Exercises  - Prone Press Up  - 6 x daily - 7 x weekly - 1 sets - 10 reps - 1-2 hold  - Standing Lumbar Extension with Counter  - 6 x daily - 7 x weekly - 1 sets - 10 reps - 1-2 hold

## 2024-03-20 ENCOUNTER — APPOINTMENT (OUTPATIENT)
Dept: PHYSICAL THERAPY | Facility: CLINIC | Age: 54
End: 2024-03-20
Payer: COMMERCIAL

## 2024-03-20 ENCOUNTER — RA CDI HCC (OUTPATIENT)
Dept: OTHER | Facility: HOSPITAL | Age: 54
End: 2024-03-20

## 2024-03-20 NOTE — PROGRESS NOTES
HCC coding opportunities          Chart Reviewed number of suggestions sent to Provider: 1     Patients Insurance        Commercial Insurance: [employer] Commercial [employer]     J45.20

## 2024-03-22 ENCOUNTER — HOSPITAL ENCOUNTER (OUTPATIENT)
Dept: RADIOLOGY | Facility: HOSPITAL | Age: 54
Discharge: HOME | End: 2024-03-22
Payer: COMMERCIAL

## 2024-03-22 DIAGNOSIS — E06.3 HYPOTHYROIDISM DUE TO HASHIMOTO'S THYROIDITIS: ICD-10-CM

## 2024-03-22 DIAGNOSIS — E03.8 HYPOTHYROIDISM DUE TO HASHIMOTO'S THYROIDITIS: ICD-10-CM

## 2024-03-22 DIAGNOSIS — K21.9 GASTROESOPHAGEAL REFLUX DISEASE WITHOUT ESOPHAGITIS: ICD-10-CM

## 2024-03-22 PROCEDURE — 76536 US EXAM OF HEAD AND NECK: CPT

## 2024-03-26 ENCOUNTER — OFFICE VISIT (OUTPATIENT)
Dept: PHYSICAL THERAPY | Facility: CLINIC | Age: 54
End: 2024-03-26
Payer: COMMERCIAL

## 2024-03-26 ENCOUNTER — HOSPITAL ENCOUNTER (OUTPATIENT)
Dept: RADIOLOGY | Facility: HOSPITAL | Age: 54
Discharge: HOME | End: 2024-03-26
Attending: RADIOLOGY

## 2024-03-26 DIAGNOSIS — M25.552 PAIN OF LEFT HIP: Primary | ICD-10-CM

## 2024-03-26 DIAGNOSIS — Z76.89 REFERRAL OF PATIENT WITHOUT EXAMINATION OR TREATMENT: ICD-10-CM

## 2024-03-26 DIAGNOSIS — R10.32 LEFT INGUINAL PAIN: ICD-10-CM

## 2024-03-26 PROCEDURE — 97112 NEUROMUSCULAR REEDUCATION: CPT

## 2024-03-26 PROCEDURE — 97110 THERAPEUTIC EXERCISES: CPT

## 2024-03-26 NOTE — PROGRESS NOTES
Daily Note      Today's date: 3/26/2024  Patient name: Екатерина Camacho  : 1970  MRN: [id number]  Referring provider: Carlos Mas DO  Dx:   Encounter Diagnosis     ICD-10-CM    1. Pain of left hip  M25.552       2. Left inguinal pain  R10.32           Subjective: Pt reports that pain has shifted toward the right hip. Pt states pain is mild.        Objective: See treatment diary below    Assessment: Pt tolerated treatment well.  Pt introduced to recumbent bike for active warm up and noted increased left heel pain at the end of this.  Pt followed this up with repeated extension in standing and noted abolishment of heel pain. Reviewed hip flexor stretching and pt demonstrated good understanding. Pt was educated in standing variation of this exercise so she can perform this throughout a work day. Pt noted good stretch when using a chair to elevate the rear foot.     Plan: Continue per plan of care.  Progress treatment as tolerated.          Insurance:  AMA/CMS Eval/ Re-eval POC expires FOTO Auth Status Co-Insurance   CMS - BC BS 3/12/24 6/12/24  Approved   12 Visits   Auth [id number]  3/12/24-24  No                                        1.  3/12 2.   3/19 3.   3/26 4.  5.  6.    7.  8.  9.  10.  11.  12.    13.  14.  15.  16.  17.  18.    19.  20. 21. 22. 23. 24.    25.  26. 27. 28. 29. 30.   31. 32.  33. 34. 35. 36.        Precautions: Standard       EVAL 2 3 4 5 6   Manuals 3/12/24 3/19/24 3/26/24      STM/MFR         Manual flexibility hip         Joint mobs          Neuro Re-Ed         TA activation  2x10 (5s) w/ self-palpation 2x10 (5s) w/ self-palpation      TA w/ hip add  2x10 (5s)  2x10 (5s)       TA w/ bridging  2x10 (3s)  2x10 (3s)       TA w/ clamshells  20x blue TB 20x blue TB      Supine march   15x B/L                         Ther Ex           Rec bike   6' L3      Prone press ups 10x         BETHANY 10x  10x 10x      Hip flexor stretch  5x15s B/L supine w/ SOS 5x15s B/L supine w/  SOS    Standing w/ rear foot elevated 5x15s                                                              Ther Activity         Pt education POC, HEP Reviewed HEP       Stairs         Squats         Gait                                    Modalities                             Access Code: [email or web address]  URL: [email or web address]  Date: 03/12/2024  Prepared by: Carl Khan    Exercises  - Prone Press Up  - 6 x daily - 7 x weekly - 1 sets - 10 reps - 1-2 hold  - Standing Lumbar Extension with Counter  - 6 x daily - 7 x weekly - 1 sets - 10 reps - 1-2 hold

## 2024-03-27 ENCOUNTER — OFFICE VISIT (OUTPATIENT)
Dept: FAMILY MEDICINE CLINIC | Facility: CLINIC | Age: 54
End: 2024-03-27
Payer: COMMERCIAL

## 2024-03-27 VITALS
RESPIRATION RATE: 17 BRPM | TEMPERATURE: 97.6 F | HEIGHT: 63 IN | SYSTOLIC BLOOD PRESSURE: 130 MMHG | HEART RATE: 96 BPM | BODY MASS INDEX: 51.38 KG/M2 | WEIGHT: 290 LBS | DIASTOLIC BLOOD PRESSURE: 74 MMHG | OXYGEN SATURATION: 97 %

## 2024-03-27 DIAGNOSIS — R10.31 RIGHT INGUINAL PAIN: ICD-10-CM

## 2024-03-27 DIAGNOSIS — Z13.820 ENCOUNTER FOR OSTEOPOROSIS SCREENING IN ASYMPTOMATIC POSTMENOPAUSAL PATIENT: ICD-10-CM

## 2024-03-27 DIAGNOSIS — Z78.0 ENCOUNTER FOR OSTEOPOROSIS SCREENING IN ASYMPTOMATIC POSTMENOPAUSAL PATIENT: ICD-10-CM

## 2024-03-27 DIAGNOSIS — R10.11 RUQ PAIN: Primary | ICD-10-CM

## 2024-03-27 DIAGNOSIS — J45.20 MILD INTERMITTENT ASTHMA WITHOUT COMPLICATION: ICD-10-CM

## 2024-03-27 PROCEDURE — 99214 OFFICE O/P EST MOD 30 MIN: CPT | Performed by: NURSE PRACTITIONER

## 2024-03-27 NOTE — PROGRESS NOTES
FAMILY PRACTICE OFFICE VISIT       NAME: Екатерина Camacho  AGE: 53 y.o. SEX: female       : 1970        MRN: [id number]    DATE: 4/3/2024  TIME: 12:45 PM    Assessment and Plan   1. RUQ pain  -     US right upper quadrant; Future; Expected date: 2024    2. Mild intermittent asthma without complication  Assessment & Plan:  Cont meds        3. Right inguinal pain  Assessment & Plan:  Cont f/u with ortho        4. Encounter for osteoporosis screening in asymptomatic postmenopausal patient  -     DXA bone density spine hip and pelvis; Future; Expected date: 2024         There are no Patient Instructions on file for this visit.        Chief Complaint     Chief Complaint   Patient presents with    Follow-up     Pt has some Thyroid concerns and wants to follow up up on her thyroid ultrasound       History of Present Illness   Екатерина Camacho is a 53 y.o.-year-old female who is here for f/u  Having ruq pain  Pain after meal  Sister with osteoporosis, would like to get tested  Concerned about thyroid, to get labs  Is on thyroid meds and needs updated labs  Having left groin pain, trouble lifting leg  Dr thomas xray hip, has osteoarthritis in hip  Asthma fair to good control      Review of Systems   Review of Systems   Constitutional:  Positive for activity change. Negative for appetite change, chills, fatigue, fever and unexpected weight change.   HENT:  Negative for congestion, postnasal drip and rhinorrhea.    Eyes:  Negative for photophobia and visual disturbance.   Respiratory:  Negative for cough, shortness of breath and wheezing.    Cardiovascular:  Negative for chest pain and palpitations.   Gastrointestinal:  Positive for abdominal pain. Negative for constipation, diarrhea, nausea and vomiting.   Genitourinary:  Negative for dysuria and frequency.   Musculoskeletal:  Positive for arthralgias and myalgias.   Skin:  Negative for rash.   Neurological:  Negative for dizziness and headaches.   Hematological:   "Negative for adenopathy.   Psychiatric/Behavioral:  Negative for dysphoric mood and sleep disturbance. The patient is not nervous/anxious.        Active Problem List     Patient Active Problem List   Diagnosis    Leg edema    Herpes simplex type 1 infection    Chronic bilateral low back pain without sciatica    Mild intermittent asthma    Gastroesophageal reflux disease without esophagitis    Anxiety    Morbid obesity with BMI of 45.0-49.9, adult (HCC)    Family history of colon cancer    Hypothyroidism due to Hashimoto's thyroiditis    Psoriasis    Ganglion of tendon sheath    Costovertebral angle pain    Herniated thoracic disc without myelopathy    Cold sore    History of COVID-19    Irritant contact dermatitis due to other agents    Chronic left-sided thoracic back pain    History of hysterectomy    RUQ pain    Right inguinal pain         Past Medical History:  Past Medical History:   Diagnosis Date    Acute pain of right shoulder     [id number] RESOLVED//seen by pain management    Allergic     Anesthesia     \"sometimes wakes up before procedure done ie, with MRI and EGD\"    Anxiety     [id number]  LAST ASSESSED    Arthritis     left knee    Asthma     allergy induced    Bulging of intervertebral disc between L4 and L5     Bursitis of right knee     better now    Depression     Ear infection     left PCP placed pt on antibiotic and ear drops    Elevated AST (SGOT)     [id number] RESOLVED    GERD (gastroesophageal reflux disease)     Goiter     [id number] RESOLVED//pt denies states has \"inflamed thyroid\"    Hiatal hernia     Hypothyroidism     [id number] RESOLVED//not on meds    Lipoma     left scapular area    Lump of axilla, right     [id number] RESOLVED marker clip in rt breast    Obstructive sleep apnea     [id number] RESOLVED    Psoriasis     Rash     under bilat breasts and folds of abdomen,, pt reports  [place] aware and dermatologist is treating/pt using ketoconazole cream as directed    Shortness of breath  " "   \"with exertion\"    Use of cane as ambulatory aid     occas       Past Surgical History:  Past Surgical History:   Procedure Laterality Date    CARPAL TUNNEL RELEASE Bilateral     COLONOSCOPY      EGD      HYSTERECTOMY  12/30/2010    WITH ENDOMETERIAL ABLATION    KNEE SURGERY Left     NERVE BLOCK      PERIPH NERVE BLOCK WRIST MEDIAN W/LOCAL STEROID INFILTRAIT    PLANTAR FASCIA RELEASE Bilateral 2009, 2020 R    June 2020 on R    WA COLONOSCOPY FLX DX W/COLLJ SPEC WHEN PFRMD N/A 08/02/2018    Procedure: EGD AND COLONOSCOPY;  Surgeon: Mulugeta Gill MD;  Location: [facility];  Service: Gastroenterology    WA EXC B9 LESION MRGN XCP SK TG T/A/L 0.5 CM/< N/A 3/2/2023    Procedure: EXCISION WIDE LESION LEFT UPPER BACK;  Surgeon: Maya Chavez MD;  Location: [facility];  Service: General    WA FASCIECTOMY PLANTAR FASCIA PARTIAL SPX Right 06/09/2020    Procedure: RELEASE FASCIA PLANTAR/FASCIOTOMY ENDOSCOPIC (EPF);  Surgeon: Viraj Harkins DPM;  Location: [facility];  Service: Podiatry    WA RELEASE TARSAL TUNNEL Right 08/11/2020    Procedure: RELEASE TARSAL TUNNEL;  Surgeon: Viraj Harkisn DPM;  Location: [facility];  Service: Podiatry    TONSILLECTOMY AND ADENOIDECTOMY         Family History:  Family History   Problem Relation Age of Onset    Cirrhosis Mother     Hypertension Father     Nephrolithiasis Sister     Fibroids Sister     Anxiety disorder Daughter     Asthma Daughter     Heart disease Maternal Grandfather     Ovarian cancer Paternal Grandmother     Obesity Family     Uterine cancer Maternal Aunt     Fibroids Sister     Fibroids Sister     Ulcerative colitis Son        Social History:  Social History     Socioeconomic History    Marital status:      Spouse name: Not on file    Number of children: Not on file    Years of education: Not on file    Highest education level: Not on file   Occupational History    Not on file   Tobacco Use    Smoking status: Never     Passive exposure: Never    Smokeless " tobacco: Never   Vaping Use    Vaping status: Never Used   Substance and Sexual Activity    Alcohol use: Yes     Comment: very rare (1 drink once per month - if that)    Drug use: No    Sexual activity: Not on file   Other Topics Concern    Not on file   Social History Narrative    DAILY COFFEE CONSUMPTION     Social Determinants of Health     Financial Resource Strain: Not on file   Food Insecurity: Not on file   Transportation Needs: Not on file   Physical Activity: Not on file   Stress: Not on file   Social Connections: Not on file   Intimate Partner Violence: Not on file   Housing Stability: Not on file       Objective     Vitals:    03/27/24 1535   BP: 130/74   Pulse: 96   Resp: 17   Temp: 97.6 °F (36.4 °C)   SpO2: 97%     Wt Readings from Last 3 Encounters:   03/27/24 132 kg (290 lb)   02/29/24 132 kg (291 lb)   01/24/24 131 kg (289 lb)       Physical Exam  Vitals and nursing note reviewed.   Constitutional:       Appearance: Normal appearance.   HENT:      Head: Normocephalic and atraumatic.      Right Ear: Tympanic membrane, ear canal and external ear normal.      Nose: Nose normal.      Mouth/Throat:      Mouth: Mucous membranes are moist.   Eyes:      Conjunctiva/sclera: Conjunctivae normal.   Cardiovascular:      Rate and Rhythm: Normal rate and regular rhythm.      Heart sounds: Normal heart sounds.   Pulmonary:      Effort: Pulmonary effort is normal.      Breath sounds: Normal breath sounds.   Abdominal:      General: Bowel sounds are normal.   Musculoskeletal:         General: Normal range of motion.      Cervical back: Normal range of motion and neck supple.   Skin:     General: Skin is warm and dry.      Capillary Refill: Capillary refill takes less than 2 seconds.   Neurological:      General: No focal deficit present.      Mental Status: She is alert and oriented to person, place, and time.   Psychiatric:         Mood and Affect: Mood normal.         Behavior: Behavior normal.         Thought  "Content: Thought content normal.         Judgment: Judgment normal.         Pertinent Laboratory/Diagnostic Studies:  Lab Results   Component Value Date    GLUCOSE 106 12/12/2015    BUN 18 04/01/2024    CREATININE 0.83 04/01/2024    CALCIUM 9.4 04/01/2024     12/12/2015    K 4.1 04/01/2024    CO2 29 04/01/2024     04/01/2024     Lab Results   Component Value Date    ALT 21 04/01/2024    AST 18 04/01/2024    ALKPHOS 63 04/01/2024    BILITOT 0.52 12/12/2015       Lab Results   Component Value Date    WBC 6.50 04/01/2024    HGB 14.1 04/01/2024    HCT 43.9 04/01/2024    MCV 86 04/01/2024     04/01/2024       No results found for: \"TSH\"    Lab Results   Component Value Date    CHOL 184 12/12/2015     Lab Results   Component Value Date    TRIG 172 (H) 03/02/2024     Lab Results   Component Value Date    HDL 56 03/02/2024     Lab Results   Component Value Date    LDLCALC 108 (H) 03/02/2024     Lab Results   Component Value Date    HGBA1C 6.2 (H) 07/28/2014       Results for orders placed or performed in visit on 03/02/24   TSH, 3rd generation with Free T4 reflex   Result Value Ref Range    TSH 3RD GENERATON 7.371 (H) 0.450 - 4.500 uIU/mL   Lipid Panel with Direct LDL reflex   Result Value Ref Range    Cholesterol 198 See Comment mg/dL    Triglycerides 172 (H) See Comment mg/dL    HDL, Direct 56 >=50 mg/dL    LDL Calculated 108 (H) 0 - 100 mg/dL   T4, free   Result Value Ref Range    Free T4 0.77 0.61 - 1.12 ng/dL       Orders Placed This Encounter   Procedures    US right upper quadrant    DXA bone density spine hip and pelvis       ALLERGIES:  Allergies   Allergen Reactions    Chlorhexidine Rash       Current Medications     Current Outpatient Medications   Medication Sig Dispense Refill    acyclovir (ZOVIRAX) 5 % ointment Apply topically every 3 (three) hours 30 g 3    albuterol (2.5 mg/3 mL) 0.083 % nebulizer solution Take 3 mL (2.5 mg total) by nebulization every 4 (four) hours as needed for wheezing " or shortness of breath 75 mL 1    budesonide-formoterol (SYMBICORT) 80-4.5 MCG/ACT inhaler Inhale 2 puffs 2 (two) times a day 10.2 g 3    guselkumab (Tremfya) subcutaneous injection       levothyroxine 150 mcg tablet TAKE 1 TABLET BY MOUTH EVERY DAY 90 tablet 1    montelukast (SINGULAIR) 10 mg tablet TAKE 1 TABLET BY MOUTH DAILY AT BEDTIME 90 tablet 1    Proventil  (90 Base) MCG/ACT inhaler Inhale 2 puffs every 4 (four) hours as needed for wheezing 20.1 g 5    triamcinolone (KENALOG) 0.1 % cream Apply topically 2 (two) times a day 30 g 1    furosemide (LASIX) 20 mg tablet TAKE 1 TABLET BY MOUTH DAILY AS NEEDED (EDEMA). (Patient not taking: Reported on 1/24/2024) 90 tablet 1    Klor-Con M20 20 MEQ tablet TAKE 1 TABLET BY MOUTH EVERY DAY (Patient not taking: Reported on 8/1/2023) 90 tablet 2    valACYclovir (VALTREX) 500 mg tablet TAKE 1 TABLET (500 MG TOTAL) BY MOUTH DAILY. 90 tablet 1     No current facility-administered medications for this visit.         Health Maintenance     Health Maintenance   Topic Date Due    Osteoporosis Screening  Never done    Zoster Vaccine (1 of 2) Never done    DTaP,Tdap,and Td Vaccines (2 - Td or Tdap) 08/30/2022    Influenza Vaccine (1) 09/01/2023    COVID-19 Vaccine (2 - 2023-24 season) 09/01/2023    PT PLAN OF CARE  04/11/2024    Annual Physical  08/01/2024    Breast Cancer Screening: Mammogram  08/22/2024    Depression Screening  01/24/2025    Colorectal Cancer Screening  08/01/2026    HIV Screening  Completed    Hepatitis C Screening  Completed    HIB Vaccine  Aged Out    IPV Vaccine  Aged Out    Hepatitis A Vaccine  Aged Out    Meningococcal ACWY Vaccine  Aged Out    HPV Vaccine  Aged Out    Pneumococcal Vaccine: Pediatrics (0 to 5 Years) and At-Risk Patients (6 to 64 Years)  Discontinued     Immunization History   Administered Date(s) Administered    COVID-19 J&J (Sam) vaccine 0.5 mL 03/19/2021    INFLUENZA 12/22/2015, 10/17/2016, 12/04/2017, 11/12/2018    Influenza  Quadrivalent Preservative Free 3 years and older IM 12/31/2014, 10/17/2016, 12/04/2017    Influenza Quadrivalent, 6-35 Months IM 12/30/2014, 12/22/2015    Influenza, injectable, quadrivalent, preservative free 0.5 mL 10/31/2019    Influenza, recombinant, quadrivalent,injectable, preservative free 11/12/2018, 12/08/2020, 12/09/2021, 11/16/2022    Influenza, seasonal, injectable 10/20/2012, 12/08/2021    Tdap 08/30/2012          THEO Lewis

## 2024-03-28 ENCOUNTER — OFFICE VISIT (OUTPATIENT)
Dept: PHYSICAL THERAPY | Facility: CLINIC | Age: 54
End: 2024-03-28
Payer: COMMERCIAL

## 2024-03-28 DIAGNOSIS — R10.32 LEFT INGUINAL PAIN: ICD-10-CM

## 2024-03-28 DIAGNOSIS — M25.552 PAIN OF LEFT HIP: Primary | ICD-10-CM

## 2024-03-28 PROCEDURE — 97112 NEUROMUSCULAR REEDUCATION: CPT

## 2024-03-28 PROCEDURE — 97110 THERAPEUTIC EXERCISES: CPT

## 2024-03-28 NOTE — PROGRESS NOTES
"Daily Note      Today's date: 3/28/2024  Patient name: Екатерина Camacho  : 1970  MRN: [id number]  Referring provider: Carlos Mas DO  Dx:   Encounter Diagnosis     ICD-10-CM    1. Pain of left hip  M25.552       2. Left inguinal pain  R10.32           Subjective: Pt reports that both legs were sore, as well as both hamstrings after exercises last visit. Pt states that it felt like \"workout\" soreness. Pt states she stretched more often than usual due to the soreness. Pt states she has been able to do backward bends and hip flexor stretch while working.        Objective: See treatment diary below    Assessment: Pt tolerated treatment well.  Pt noted no exacerbation of hamstrings with bridging today. Pt noted soreness with muscle use for left hip adductors with adductor isos. Pt introduced to pallof press and lateral walks and noted no pain or soreness. Educated pt regarding differentiation of muscle soreness vs. reproduction of usual pain.      Plan: Continue per plan of care.  Progress treatment as tolerated.          Insurance:  AMA/CMS Eval/ Re-eval POC expires FOTO Auth Status Co-Insurance   CMS - BC BS 3/12/24 6/12/24  Approved   12 Visits   Auth [id number]  3/12/24-24  No                                        1.  3/12 2.   3 3.   3/26 4.   3/28 5.  6.    7.  8.  9.  10.  11.  12.    13.  14.  15.  16.  17.  18.    19.  20. 21. 22. 23. 24.    25.  26. 27. 28. 29. 30.   31. 32.  33. 34. 35. 36.        Precautions: Standard       EVAL 2 3 4 5 6   Manuals 3/12/24 3/19/24 3/26/24 3/28/24     STM/MFR         Manual flexibility hip         Joint mobs          Neuro Re-Ed         TA activation  2x10 (5s) w/ self-palpation 2x10 (5s) w/ self-palpation      TA w/ hip add  2x10 (5s)  2x10 (5s)  2x10 (5s)      TA w/ bridging  2x10 (3s)  2x10 (3s)  2x10 (3s)      TA w/ clamshells  20x blue TB 20x blue TB 20x blue TB        15x B/L  15x B/L      Lateral walks    6x15 ft GTB     Pallof press    "  20x B/L GTB     Ther Ex           Rec bike   6' L3 Nustep 8' L2      Prone press ups 10x         BETHANY 10x  10x 10x 10x      Hip flexor stretch  5x15s B/L supine w/ SOS 5x15s B/L supine w/ SOS    Standing w/ rear foot elevated 5x15s 5x15s B/L supine w/ SOS                                                             Ther Activity         Pt education POC, HEP Reviewed HEP  Reviewed DOMS vs. Reproducing usual symptoms     Stairs         Squats         Gait                                    Modalities                             Access Code: [email or web address]  URL: [email or web address]  Date: 03/12/2024  Prepared by: Carl Khan    Exercises  - Prone Press Up  - 6 x daily - 7 x weekly - 1 sets - 10 reps - 1-2 hold  - Standing Lumbar Extension with Counter  - 6 x daily - 7 x weekly - 1 sets - 10 reps - 1-2 hold

## 2024-04-01 ENCOUNTER — OFFICE VISIT (OUTPATIENT)
Dept: PHYSICAL THERAPY | Facility: CLINIC | Age: 54
End: 2024-04-01
Payer: COMMERCIAL

## 2024-04-01 ENCOUNTER — APPOINTMENT (OUTPATIENT)
Dept: LAB | Facility: HOSPITAL | Age: 54
End: 2024-04-01
Payer: COMMERCIAL

## 2024-04-01 DIAGNOSIS — M25.552 PAIN OF LEFT HIP: Primary | ICD-10-CM

## 2024-04-01 DIAGNOSIS — E03.8 HYPOTHYROIDISM DUE TO HASHIMOTO'S THYROIDITIS: ICD-10-CM

## 2024-04-01 DIAGNOSIS — E06.3 HYPOTHYROIDISM DUE TO HASHIMOTO'S THYROIDITIS: ICD-10-CM

## 2024-04-01 DIAGNOSIS — E66.01 MORBID OBESITY WITH BMI OF 45.0-49.9, ADULT (HCC): ICD-10-CM

## 2024-04-01 DIAGNOSIS — K21.9 GASTROESOPHAGEAL REFLUX DISEASE WITHOUT ESOPHAGITIS: ICD-10-CM

## 2024-04-01 DIAGNOSIS — R10.32 LEFT INGUINAL PAIN: ICD-10-CM

## 2024-04-01 LAB
ALBUMIN SERPL BCP-MCNC: 4.2 G/DL (ref 3.5–5)
ALP SERPL-CCNC: 63 U/L (ref 34–104)
ALT SERPL W P-5'-P-CCNC: 21 U/L (ref 7–52)
ANION GAP SERPL CALCULATED.3IONS-SCNC: 5 MMOL/L (ref 4–13)
AST SERPL W P-5'-P-CCNC: 18 U/L (ref 13–39)
BASOPHILS # BLD AUTO: 0.04 THOUSANDS/ÂΜL (ref 0–0.1)
BASOPHILS NFR BLD AUTO: 1 % (ref 0–1)
BILIRUB SERPL-MCNC: 0.59 MG/DL (ref 0.2–1)
BUN SERPL-MCNC: 18 MG/DL (ref 5–25)
CALCIUM SERPL-MCNC: 9.4 MG/DL (ref 8.4–10.2)
CHLORIDE SERPL-SCNC: 107 MMOL/L (ref 96–108)
CO2 SERPL-SCNC: 29 MMOL/L (ref 21–32)
CREAT SERPL-MCNC: 0.83 MG/DL (ref 0.6–1.3)
EOSINOPHIL # BLD AUTO: 0.31 THOUSAND/ÂΜL (ref 0–0.61)
EOSINOPHIL NFR BLD AUTO: 5 % (ref 0–6)
ERYTHROCYTE [DISTWIDTH] IN BLOOD BY AUTOMATED COUNT: 13 % (ref 11.6–15.1)
GFR SERPL CREATININE-BSD FRML MDRD: 80 ML/MIN/1.73SQ M
GLUCOSE P FAST SERPL-MCNC: 96 MG/DL (ref 65–99)
HCT VFR BLD AUTO: 43.9 % (ref 34.8–46.1)
HGB BLD-MCNC: 14.1 G/DL (ref 11.5–15.4)
IMM GRANULOCYTES # BLD AUTO: 0.01 THOUSAND/UL (ref 0–0.2)
IMM GRANULOCYTES NFR BLD AUTO: 0 % (ref 0–2)
LYMPHOCYTES # BLD AUTO: 2.75 THOUSANDS/ÂΜL (ref 0.6–4.47)
LYMPHOCYTES NFR BLD AUTO: 42 % (ref 14–44)
MCH RBC QN AUTO: 27.5 PG (ref 26.8–34.3)
MCHC RBC AUTO-ENTMCNC: 32.1 G/DL (ref 31.4–37.4)
MCV RBC AUTO: 86 FL (ref 82–98)
MONOCYTES # BLD AUTO: 0.54 THOUSAND/ÂΜL (ref 0.17–1.22)
MONOCYTES NFR BLD AUTO: 8 % (ref 4–12)
NEUTROPHILS # BLD AUTO: 2.85 THOUSANDS/ÂΜL (ref 1.85–7.62)
NEUTS SEG NFR BLD AUTO: 44 % (ref 43–75)
NRBC BLD AUTO-RTO: 0 /100 WBCS
PLATELET # BLD AUTO: 213 THOUSANDS/UL (ref 149–390)
PMV BLD AUTO: 10.2 FL (ref 8.9–12.7)
POTASSIUM SERPL-SCNC: 4.1 MMOL/L (ref 3.5–5.3)
PROT SERPL-MCNC: 7.4 G/DL (ref 6.4–8.4)
RBC # BLD AUTO: 5.13 MILLION/UL (ref 3.81–5.12)
SODIUM SERPL-SCNC: 141 MMOL/L (ref 135–147)
T4 FREE SERPL-MCNC: 0.77 NG/DL (ref 0.61–1.12)
TSH SERPL DL<=0.05 MIU/L-ACNC: 7.37 UIU/ML (ref 0.45–4.5)
WBC # BLD AUTO: 6.5 THOUSAND/UL (ref 4.31–10.16)

## 2024-04-01 PROCEDURE — 84443 ASSAY THYROID STIM HORMONE: CPT

## 2024-04-01 PROCEDURE — 84439 ASSAY OF FREE THYROXINE: CPT

## 2024-04-01 PROCEDURE — 85025 COMPLETE CBC W/AUTO DIFF WBC: CPT

## 2024-04-01 PROCEDURE — 80053 COMPREHEN METABOLIC PANEL: CPT

## 2024-04-01 PROCEDURE — 97112 NEUROMUSCULAR REEDUCATION: CPT

## 2024-04-01 PROCEDURE — 97110 THERAPEUTIC EXERCISES: CPT

## 2024-04-01 NOTE — PROGRESS NOTES
Daily Note      Today's date: 2024  Patient name: Екатерина Camacho  : 1970  MRN: [id number]  Referring provider: Carlos Mas DO  Dx:   Encounter Diagnosis     ICD-10-CM    1. Pain of left hip  M25.552       2. Left inguinal pain  R10.32           Subjective: Pt reports that she has increased right knee, hamstrings, and quad soreness after moving this past weekend.        Objective: See treatment diary below    Assessment: Pt tolerated treatment well.  Pt noted no exacerbation of right knee and HS pain, but noted increased stretching with supine hip flexor stretch on the right compared to previously. Pt introduced to PB press with hip abduction and extension, but noted increased difficulty with maintaining SLS on RLE while holding onto unstable surface, so this was regressed to holding onto the plinth.     Plan: Continue per plan of care.  Progress treatment as tolerated.          Insurance:  AMA/CMS Eval/ Re-eval POC expires FOTO Auth Status Co-Insurance   CMS - BC BS 3/12/24 6/12/24  Approved   12 Visits   Auth [id number]  3/12/24-24  No                                        1.  3 2.   3 3.   3/26 4.   3/28 5.   4/ 6.    7.  8.  9.  10.  11.  12.    13.  14.  15.  16.  17.  18.    19.  20. 21. 22. 23. 24.    25.  26. 27. 28. 29. 30.   31. 32.  33. 34. 35. 36.        Precautions: Standard       EVAL 2 3 4 5 6   Manuals 3/12/24 3/19/24 3/26/24 3/28/24 4/1/24    STM/MFR         Manual flexibility hip         Joint mobs          Neuro Re-Ed         TA activation  2x10 (5s) w/ self-palpation 2x10 (5s) w/ self-palpation      TA w/ hip add  2x10 (5s)  2x10 (5s)  2x10 (5s)  2x10 (5s)     TA w/ bridging  2x10 (3s)  2x10 (3s)  2x10 (3s)  2x10 (3s)     TA w/ clamshells  20x blue TB 20x blue TB 20x blue TB 20x blue TB       15x B/L  15x B/L  15x B/L     Lateral walks    6x15 ft GTB     Pallof press     20x B/L GTB     PB press w/ hip abd/ext     Attempted; regressed to plinth 10x ea.  B/L     Ther Ex           Rec bike   6' L3 Nustep 8' L2  Nustep 8' L2    Prone press ups 10x         BETHANY 10x  10x 10x 10x  10x    Hip flexor stretch  5x15s B/L supine w/ SOS 5x15s B/L supine w/ SOS    Standing w/ rear foot elevated 5x15s 5x15s B/L supine w/ SOS 5x15s B/L supine w/ SOS                                                            Ther Activity         Pt education POC, HEP Reviewed HEP  Reviewed DOMS vs. Reproducing usual symptoms Reviewed POC    Stairs         Squats         Gait                                    Modalities                             Access Code: [email or web address]  URL: [email or web address]  Date: 03/12/2024  Prepared by: aCrl Khan    Exercises  - Prone Press Up  - 6 x daily - 7 x weekly - 1 sets - 10 reps - 1-2 hold  - Standing Lumbar Extension with Counter  - 6 x daily - 7 x weekly - 1 sets - 10 reps - 1-2 hold

## 2024-04-02 ENCOUNTER — HOSPITAL ENCOUNTER (OUTPATIENT)
Dept: RADIOLOGY | Facility: MEDICAL CENTER | Age: 54
Discharge: HOME | End: 2024-04-02
Payer: COMMERCIAL

## 2024-04-02 DIAGNOSIS — Z78.0 ENCOUNTER FOR OSTEOPOROSIS SCREENING IN ASYMPTOMATIC POSTMENOPAUSAL PATIENT: ICD-10-CM

## 2024-04-02 DIAGNOSIS — Z13.820 ENCOUNTER FOR OSTEOPOROSIS SCREENING IN ASYMPTOMATIC POSTMENOPAUSAL PATIENT: ICD-10-CM

## 2024-04-02 PROCEDURE — 77080 DXA BONE DENSITY AXIAL: CPT

## 2024-04-03 ENCOUNTER — HOSPITAL ENCOUNTER (OUTPATIENT)
Dept: RADIOLOGY | Facility: HOSPITAL | Age: 54
Discharge: HOME | End: 2024-04-03
Payer: COMMERCIAL

## 2024-04-03 ENCOUNTER — APPOINTMENT (OUTPATIENT)
Dept: PHYSICAL THERAPY | Facility: CLINIC | Age: 54
End: 2024-04-03
Payer: COMMERCIAL

## 2024-04-03 DIAGNOSIS — R10.11 RUQ PAIN: ICD-10-CM

## 2024-04-03 PROBLEM — R10.31 RIGHT INGUINAL PAIN: Status: ACTIVE | Noted: 2024-04-03

## 2024-04-03 PROCEDURE — 76705 ECHO EXAM OF ABDOMEN: CPT

## 2024-04-09 ENCOUNTER — APPOINTMENT (OUTPATIENT)
Dept: PHYSICAL THERAPY | Facility: CLINIC | Age: 54
End: 2024-04-09
Payer: COMMERCIAL

## 2024-04-16 ENCOUNTER — OFFICE VISIT (OUTPATIENT)
Dept: FAMILY MEDICINE CLINIC | Facility: CLINIC | Age: 54
End: 2024-04-16
Payer: COMMERCIAL

## 2024-04-16 VITALS
HEART RATE: 79 BPM | HEIGHT: 63 IN | BODY MASS INDEX: 51.91 KG/M2 | TEMPERATURE: 96.2 F | DIASTOLIC BLOOD PRESSURE: 80 MMHG | OXYGEN SATURATION: 98 % | RESPIRATION RATE: 18 BRPM | SYSTOLIC BLOOD PRESSURE: 126 MMHG | WEIGHT: 293 LBS

## 2024-04-16 DIAGNOSIS — E66.01 CLASS 3 SEVERE OBESITY DUE TO EXCESS CALORIES WITH SERIOUS COMORBIDITY AND BODY MASS INDEX (BMI) OF 50.0 TO 59.9 IN ADULT (HCC): ICD-10-CM

## 2024-04-16 DIAGNOSIS — K76.0 FATTY LIVER: ICD-10-CM

## 2024-04-16 DIAGNOSIS — R16.0 ENLARGED LIVER: Primary | ICD-10-CM

## 2024-04-16 DIAGNOSIS — K21.9 GASTROESOPHAGEAL REFLUX DISEASE WITHOUT ESOPHAGITIS: ICD-10-CM

## 2024-04-16 DIAGNOSIS — J45.20 MILD INTERMITTENT ASTHMA WITHOUT COMPLICATION: ICD-10-CM

## 2024-04-16 PROCEDURE — 99214 OFFICE O/P EST MOD 30 MIN: CPT | Performed by: NURSE PRACTITIONER

## 2024-04-16 NOTE — PROGRESS NOTES
FAMILY PRACTICE OFFICE VISIT       NAME: Екатерина Camacho  AGE: 53 y.o. SEX: female       : 1970        MRN: [id number]    DATE: 2024  TIME: 11:44 AM    Assessment and Plan   1. Enlarged liver  -     Ambulatory Referral to Gastroenterology; Future    2. Fatty liver  -     Ambulatory Referral to Gastroenterology; Future    3. Class 3 severe obesity due to excess calories with serious comorbidity and body mass index (BMI) of 50.0 to 59.9 in adult (HCC)  Assessment & Plan:  Discussed need to lose weight for liver disease and overall health and wellbeing        4. Mild intermittent asthma without complication  Assessment & Plan:  Stable  Cont meds        5. Gastroesophageal reflux disease without esophagitis  Assessment & Plan:  Controlled off meds             There are no Patient Instructions on file for this visit.        Chief Complaint     Chief Complaint   Patient presents with    Results     Pt wants to go over test results- US       History of Present Illness   Екатерина Camacho is a 53 y.o.-year-old female who is here for f/u to abnormal liver ultrasound and labs  Has fatty liver  Mother passed from [place]  Family hx of fatty liver disease  Patient is overweight  Does not drink a lot of alcohol and now till be eliminating it for the most part from her diet  Feels well otherwise  Asthma well controlled  Gerd is controlled      Review of Systems   Review of Systems   Constitutional:  Negative for fatigue and fever.   HENT:  Negative for congestion, postnasal drip and rhinorrhea.    Eyes:  Negative for photophobia and visual disturbance.   Respiratory:  Negative for cough, shortness of breath and wheezing.    Cardiovascular:  Negative for chest pain and palpitations.   Gastrointestinal:  Negative for constipation, diarrhea, nausea and vomiting.   Genitourinary:  Negative for dysuria and frequency.   Musculoskeletal:  Negative for arthralgias and myalgias.   Skin:  Negative for rash.   Neurological:  Negative for  "dizziness, light-headedness and headaches.   Hematological:  Negative for adenopathy.   Psychiatric/Behavioral:  Negative for dysphoric mood and sleep disturbance. The patient is not nervous/anxious.        Active Problem List     Patient Active Problem List   Diagnosis    Leg edema    Herpes simplex type 1 infection    Chronic bilateral low back pain without sciatica    Mild intermittent asthma    Gastroesophageal reflux disease without esophagitis    Anxiety    Class 3 severe obesity due to excess calories with serious comorbidity and body mass index (BMI) of 50.0 to 59.9 in adult (HCC)    Family history of colon cancer    Hypothyroidism due to Hashimoto's thyroiditis    Psoriasis    Ganglion of tendon sheath    Costovertebral angle pain    Herniated thoracic disc without myelopathy    Cold sore    History of COVID-19    Irritant contact dermatitis due to other agents    Chronic left-sided thoracic back pain    History of hysterectomy    RUQ pain    Right inguinal pain    Enlarged liver    Fatty liver         Past Medical History:  Past Medical History:   Diagnosis Date    Acute pain of right shoulder     [id number] RESOLVED//seen by pain management    Allergic     Anesthesia     \"sometimes wakes up before procedure done ie, with MRI and EGD\"    Anxiety     [id number]  LAST ASSESSED    Arthritis     left knee    Asthma     allergy induced    Bulging of intervertebral disc between L4 and L5     Bursitis of right knee     better now    Depression     Ear infection     left PCP placed pt on antibiotic and ear drops    Elevated AST (SGOT)     [id number] RESOLVED    GERD (gastroesophageal reflux disease)     Goiter     [id number] RESOLVED//pt denies states has \"inflamed thyroid\"    Hiatal hernia     Hypothyroidism     [id number] RESOLVED//not on meds    Lipoma     left scapular area    Lump of axilla, right     [id number] RESOLVED marker clip in rt breast    Obstructive sleep apnea     [id number] RESOLVED    Psoriasis     " "Rash     under bilat breasts and folds of abdomen,, pt reports Dr Harkins aware and dermatologist is treating/pt using ketoconazole cream as directed    Shortness of breath     \"with exertion\"    Use of cane as ambulatory aid     occas       Past Surgical History:  Past Surgical History:   Procedure Laterality Date    CARPAL TUNNEL RELEASE Bilateral     COLONOSCOPY      EGD      HYSTERECTOMY  12/30/2010    WITH ENDOMETERIAL ABLATION    KNEE SURGERY Left     NERVE BLOCK      PERIPH NERVE BLOCK WRIST MEDIAN W/LOCAL STEROID INFILTRAIT    PLANTAR FASCIA RELEASE Bilateral 2009, 2020 R    June 2020 on R    NC COLONOSCOPY FLX DX W/COLLJ SPEC WHEN PFRMD N/A 08/02/2018    Procedure: EGD AND COLONOSCOPY;  Surgeon: Mulugeta Gill MD;  Location: [facility];  Service: Gastroenterology    NC EXC B9 LESION MRGN XCP SK TG T/A/L 0.5 CM/< N/A 3/2/2023    Procedure: EXCISION WIDE LESION LEFT UPPER BACK;  Surgeon: Maya Chavez MD;  Location: [facility];  Service: General    NC FASCIECTOMY PLANTAR FASCIA PARTIAL SPX Right 06/09/2020    Procedure: RELEASE FASCIA PLANTAR/FASCIOTOMY ENDOSCOPIC (EPF);  Surgeon: Viraj Harkins DPM;  Location: [facility];  Service: Podiatry    NC RELEASE TARSAL TUNNEL Right 08/11/2020    Procedure: RELEASE TARSAL TUNNEL;  Surgeon: Viraj Harkins DPM;  Location: [facility];  Service: Podiatry    TONSILLECTOMY AND ADENOIDECTOMY         Family History:  Family History   Problem Relation Age of Onset    Cirrhosis Mother     Hypertension Father     Nephrolithiasis Sister     Fibroids Sister     Anxiety disorder Daughter     Asthma Daughter     Heart disease Maternal Grandfather     Ovarian cancer Paternal Grandmother     Obesity Family     Uterine cancer Maternal Aunt     Fibroids Sister     Fibroids Sister     Ulcerative colitis Son        Social History:  Social History     Socioeconomic History    Marital status:      Spouse name: Not on file    Number of children: Not on file    Years of " education: Not on file    Highest education level: Not on file   Occupational History    Not on file   Tobacco Use    Smoking status: Never     Passive exposure: Never    Smokeless tobacco: Never   Vaping Use    Vaping status: Never Used   Substance and Sexual Activity    Alcohol use: Yes     Comment: very rare (1 drink once per month - if that)    Drug use: No    Sexual activity: Not on file   Other Topics Concern    Not on file   Social History Narrative    DAILY COFFEE CONSUMPTION     Social Determinants of Health     Financial Resource Strain: Not on file   Food Insecurity: Not on file   Transportation Needs: Not on file   Physical Activity: Not on file   Stress: Not on file   Social Connections: Not on file   Intimate Partner Violence: Not on file   Housing Stability: Not on file       Objective     Vitals:    04/16/24 1616   BP: 126/80   Pulse: 79   Resp: 18   Temp: (!) 96.2 °F (35.7 °C)   SpO2: 98%     Wt Readings from Last 3 Encounters:   04/16/24 134 kg (295 lb)   03/27/24 132 kg (290 lb)   02/29/24 132 kg (291 lb)       Physical Exam  Vitals and nursing note reviewed.   Constitutional:       Appearance: Normal appearance. She is obese.   HENT:      Head: Normocephalic and atraumatic.      Right Ear: Tympanic membrane, ear canal and external ear normal.      Left Ear: Tympanic membrane, ear canal and external ear normal.      Nose: Nose normal.      Mouth/Throat:      Mouth: Mucous membranes are moist.   Eyes:      Conjunctiva/sclera: Conjunctivae normal.   Cardiovascular:      Rate and Rhythm: Normal rate and regular rhythm.      Heart sounds: Normal heart sounds.   Pulmonary:      Effort: Pulmonary effort is normal.      Breath sounds: Normal breath sounds.   Abdominal:      General: Bowel sounds are normal.   Musculoskeletal:         General: Normal range of motion.      Cervical back: Normal range of motion and neck supple.   Skin:     General: Skin is warm and dry.      Capillary Refill: Capillary  "refill takes less than 2 seconds.   Neurological:      General: No focal deficit present.      Mental Status: She is alert and oriented to person, place, and time.   Psychiatric:         Mood and Affect: Mood normal.         Behavior: Behavior normal.         Thought Content: Thought content normal.         Judgment: Judgment normal.         Pertinent Laboratory/Diagnostic Studies:  Lab Results   Component Value Date    GLUCOSE 106 12/12/2015    BUN 18 04/01/2024    CREATININE 0.83 04/01/2024    CALCIUM 9.4 04/01/2024     12/12/2015    K 4.1 04/01/2024    CO2 29 04/01/2024     04/01/2024     Lab Results   Component Value Date    ALT 21 04/01/2024    AST 18 04/01/2024    ALKPHOS 63 04/01/2024    BILITOT 0.52 12/12/2015       Lab Results   Component Value Date    WBC 6.50 04/01/2024    HGB 14.1 04/01/2024    HCT 43.9 04/01/2024    MCV 86 04/01/2024     04/01/2024       No results found for: \"TSH\"    Lab Results   Component Value Date    CHOL 184 12/12/2015     Lab Results   Component Value Date    TRIG 172 (H) 03/02/2024     Lab Results   Component Value Date    HDL 56 03/02/2024     Lab Results   Component Value Date    LDLCALC 108 (H) 03/02/2024     Lab Results   Component Value Date    HGBA1C 6.2 (H) 07/28/2014       Results for orders placed or performed in visit on 04/01/24   Comprehensive metabolic panel   Result Value Ref Range    Sodium 141 135 - 147 mmol/L    Potassium 4.1 3.5 - 5.3 mmol/L    Chloride 107 96 - 108 mmol/L    CO2 29 21 - 32 mmol/L    ANION GAP 5 4 - 13 mmol/L    BUN 18 5 - 25 mg/dL    Creatinine 0.83 0.60 - 1.30 mg/dL    Glucose, Fasting 96 65 - 99 mg/dL    Calcium 9.4 8.4 - 10.2 mg/dL    AST 18 13 - 39 U/L    ALT 21 7 - 52 U/L    Alkaline Phosphatase 63 34 - 104 U/L    Total Protein 7.4 6.4 - 8.4 g/dL    Albumin 4.2 3.5 - 5.0 g/dL    Total Bilirubin 0.59 0.20 - 1.00 mg/dL    eGFR 80 ml/min/1.73sq m   CBC and differential   Result Value Ref Range    WBC 6.50 4.31 - 10.16 " Thousand/uL    RBC 5.13 (H) 3.81 - 5.12 Million/uL    Hemoglobin 14.1 11.5 - 15.4 g/dL    Hematocrit 43.9 34.8 - 46.1 %    MCV 86 82 - 98 fL    MCH 27.5 26.8 - 34.3 pg    MCHC 32.1 31.4 - 37.4 g/dL    RDW 13.0 11.6 - 15.1 %    MPV 10.2 8.9 - 12.7 fL    Platelets 213 149 - 390 Thousands/uL    nRBC 0 /100 WBCs    Segmented % 44 43 - 75 %    Immature Grans % 0 0 - 2 %    Lymphocytes % 42 14 - 44 %    Monocytes % 8 4 - 12 %    Eosinophils Relative 5 0 - 6 %    Basophils Relative 1 0 - 1 %    Absolute Neutrophils 2.85 1.85 - 7.62 Thousands/µL    Absolute Immature Grans 0.01 0.00 - 0.20 Thousand/uL    Absolute Lymphocytes 2.75 0.60 - 4.47 Thousands/µL    Absolute Monocytes 0.54 0.17 - 1.22 Thousand/µL    Eosinophils Absolute 0.31 0.00 - 0.61 Thousand/µL    Basophils Absolute 0.04 0.00 - 0.10 Thousands/µL       Orders Placed This Encounter   Procedures    Ambulatory Referral to Gastroenterology       ALLERGIES:  Allergies   Allergen Reactions    Chlorhexidine Rash       Current Medications     Current Outpatient Medications   Medication Sig Dispense Refill    acyclovir (ZOVIRAX) 5 % ointment Apply topically every 3 (three) hours 30 g 3    albuterol (2.5 mg/3 mL) 0.083 % nebulizer solution Take 3 mL (2.5 mg total) by nebulization every 4 (four) hours as needed for wheezing or shortness of breath 75 mL 1    budesonide-formoterol (SYMBICORT) 80-4.5 MCG/ACT inhaler Inhale 2 puffs 2 (two) times a day 10.2 g 3    guselkumab (Tremfya) subcutaneous injection       levothyroxine 150 mcg tablet TAKE 1 TABLET BY MOUTH EVERY DAY 90 tablet 1    montelukast (SINGULAIR) 10 mg tablet TAKE 1 TABLET BY MOUTH DAILY AT BEDTIME 90 tablet 1    Proventil  (90 Base) MCG/ACT inhaler Inhale 2 puffs every 4 (four) hours as needed for wheezing 20.1 g 5    triamcinolone (KENALOG) 0.1 % cream Apply topically 2 (two) times a day 30 g 1    furosemide (LASIX) 20 mg tablet TAKE 1 TABLET BY MOUTH DAILY AS NEEDED (EDEMA). (Patient not taking: Reported  on 1/24/2024) 90 tablet 1    Klor-Con M20 20 MEQ tablet TAKE 1 TABLET BY MOUTH EVERY DAY (Patient not taking: Reported on 8/1/2023) 90 tablet 2    valACYclovir (VALTREX) 500 mg tablet TAKE 1 TABLET (500 MG TOTAL) BY MOUTH DAILY. 90 tablet 1     No current facility-administered medications for this visit.         Health Maintenance     Health Maintenance   Topic Date Due    Zoster Vaccine (1 of 2) Never done    DTaP,Tdap,and Td Vaccines (2 - Td or Tdap) 08/30/2022    PT PLAN OF CARE  04/11/2024    COVID-19 Vaccine (2 - 2023-24 season) 08/01/2024 (Originally 9/1/2023)    Annual Physical  08/01/2024    Breast Cancer Screening: Mammogram  08/22/2024    Influenza Vaccine (Season Ended) 09/01/2024    Depression Screening  01/24/2025    Colorectal Cancer Screening  08/01/2026    HIV Screening  Completed    Hepatitis C Screening  Completed    Osteoporosis Screening  Completed    HIB Vaccine  Aged Out    IPV Vaccine  Aged Out    Hepatitis A Vaccine  Aged Out    Meningococcal ACWY Vaccine  Aged Out    HPV Vaccine  Aged Out    Pneumococcal Vaccine: Pediatrics (0 to 5 Years) and At-Risk Patients (6 to 64 Years)  Discontinued     Immunization History   Administered Date(s) Administered    COVID-19 J&J ([employer]) vaccine 0.5 mL 03/19/2021    INFLUENZA 12/22/2015, 10/17/2016, 12/04/2017, 11/12/2018    Influenza Quadrivalent Preservative Free 3 years and older IM 12/31/2014, 10/17/2016, 12/04/2017    Influenza Quadrivalent, 6-35 Months IM 12/30/2014, 12/22/2015    Influenza, injectable, quadrivalent, preservative free 0.5 mL 10/31/2019    Influenza, recombinant, quadrivalent,injectable, preservative free 11/12/2018, 12/08/2020, 12/09/2021, 11/16/2022    Influenza, seasonal, injectable 10/20/2012, 12/08/2021    Tdap 08/30/2012          THEO Lewis

## 2024-05-01 PROBLEM — K76.0 FATTY LIVER: Status: ACTIVE | Noted: 2024-05-01

## 2024-05-01 PROBLEM — E66.813 CLASS 3 SEVERE OBESITY DUE TO EXCESS CALORIES WITH SERIOUS COMORBIDITY AND BODY MASS INDEX (BMI) OF 50.0 TO 59.9 IN ADULT (HCC): Status: ACTIVE | Noted: 2018-06-18

## 2024-05-01 PROBLEM — R16.0 ENLARGED LIVER: Status: ACTIVE | Noted: 2024-05-01

## 2024-05-27 ENCOUNTER — APPOINTMENT (EMERGENCY)
Dept: RADIOLOGY | Facility: HOSPITAL | Age: 54
End: 2024-05-27
Payer: COMMERCIAL

## 2024-05-27 ENCOUNTER — HOSPITAL ENCOUNTER (EMERGENCY)
Facility: HOSPITAL | Age: 54
Discharge: HOME | End: 2024-05-27
Attending: EMERGENCY MEDICINE
Payer: COMMERCIAL

## 2024-05-27 ENCOUNTER — APPOINTMENT (EMERGENCY)
Dept: CT IMAGING | Facility: HOSPITAL | Age: 54
End: 2024-05-27
Payer: COMMERCIAL

## 2024-05-27 VITALS
BODY MASS INDEX: 52.26 KG/M2 | HEIGHT: 63 IN | OXYGEN SATURATION: 95 % | DIASTOLIC BLOOD PRESSURE: 83 MMHG | HEART RATE: 65 BPM | RESPIRATION RATE: 20 BRPM | SYSTOLIC BLOOD PRESSURE: 140 MMHG | TEMPERATURE: 97.9 F

## 2024-05-27 DIAGNOSIS — M54.12 CERVICAL RADICULOPATHY: Primary | ICD-10-CM

## 2024-05-27 DIAGNOSIS — R07.9 CHEST PAIN: ICD-10-CM

## 2024-05-27 LAB
ALBUMIN SERPL BCP-MCNC: 4.1 G/DL (ref 3.5–5)
ALP SERPL-CCNC: 66 U/L (ref 34–104)
ALT SERPL W P-5'-P-CCNC: 18 U/L (ref 7–52)
ANION GAP SERPL CALCULATED.3IONS-SCNC: 7 MMOL/L (ref 4–13)
AST SERPL W P-5'-P-CCNC: 18 U/L (ref 13–39)
ATRIAL RATE: 78 BPM
BASOPHILS # BLD AUTO: 0.05 THOUSANDS/ÂΜL (ref 0–0.1)
BASOPHILS NFR BLD AUTO: 1 % (ref 0–1)
BILIRUB SERPL-MCNC: 0.38 MG/DL (ref 0.2–1)
BUN SERPL-MCNC: 19 MG/DL (ref 5–25)
CALCIUM SERPL-MCNC: 9.1 MG/DL (ref 8.4–10.2)
CARDIAC TROPONIN I PNL SERPL HS: <2 NG/L
CHLORIDE SERPL-SCNC: 107 MMOL/L (ref 96–108)
CO2 SERPL-SCNC: 24 MMOL/L (ref 21–32)
CREAT SERPL-MCNC: 0.79 MG/DL (ref 0.6–1.3)
EOSINOPHIL # BLD AUTO: 0.27 THOUSAND/ÂΜL (ref 0–0.61)
EOSINOPHIL NFR BLD AUTO: 4 % (ref 0–6)
ERYTHROCYTE [DISTWIDTH] IN BLOOD BY AUTOMATED COUNT: 13.3 % (ref 11.6–15.1)
GFR SERPL CREATININE-BSD FRML MDRD: 85 ML/MIN/1.73SQ M
GLUCOSE SERPL-MCNC: 157 MG/DL (ref 65–140)
HCT VFR BLD AUTO: 43.3 % (ref 34.8–46.1)
HGB BLD-MCNC: 14 G/DL (ref 11.5–15.4)
IMM GRANULOCYTES # BLD AUTO: 0.03 THOUSAND/UL (ref 0–0.2)
IMM GRANULOCYTES NFR BLD AUTO: 0 % (ref 0–2)
LYMPHOCYTES # BLD AUTO: 2.72 THOUSANDS/ÂΜL (ref 0.6–4.47)
LYMPHOCYTES NFR BLD AUTO: 39 % (ref 14–44)
MCH RBC QN AUTO: 28.2 PG (ref 26.8–34.3)
MCHC RBC AUTO-ENTMCNC: 32.3 G/DL (ref 31.4–37.4)
MCV RBC AUTO: 87 FL (ref 82–98)
MONOCYTES # BLD AUTO: 0.48 THOUSAND/ÂΜL (ref 0.17–1.22)
MONOCYTES NFR BLD AUTO: 7 % (ref 4–12)
NEUTROPHILS # BLD AUTO: 3.42 THOUSANDS/ÂΜL (ref 1.85–7.62)
NEUTS SEG NFR BLD AUTO: 49 % (ref 43–75)
NRBC BLD AUTO-RTO: 0 /100 WBCS
P AXIS: 19 DEGREES
PLATELET # BLD AUTO: 201 THOUSANDS/UL (ref 149–390)
PMV BLD AUTO: 10.3 FL (ref 8.9–12.7)
POTASSIUM SERPL-SCNC: 3.8 MMOL/L (ref 3.5–5.3)
PR INTERVAL: 148 MS
PROT SERPL-MCNC: 7.2 G/DL (ref 6.4–8.4)
QRS AXIS: 72 DEGREES
QRSD INTERVAL: 94 MS
QT INTERVAL: 376 MS
QTC INTERVAL: 428 MS
RBC # BLD AUTO: 4.97 MILLION/UL (ref 3.81–5.12)
SODIUM SERPL-SCNC: 138 MMOL/L (ref 135–147)
T WAVE AXIS: 48 DEGREES
VENTRICULAR RATE: 78 BPM
WBC # BLD AUTO: 6.97 THOUSAND/UL (ref 4.31–10.16)

## 2024-05-27 PROCEDURE — 96374 THER/PROPH/DIAG INJ IV PUSH: CPT

## 2024-05-27 PROCEDURE — 85025 COMPLETE CBC W/AUTO DIFF WBC: CPT | Performed by: EMERGENCY MEDICINE

## 2024-05-27 PROCEDURE — 71046 X-RAY EXAM CHEST 2 VIEWS: CPT

## 2024-05-27 PROCEDURE — 99285 EMERGENCY DEPT VISIT HI MDM: CPT | Performed by: EMERGENCY MEDICINE

## 2024-05-27 PROCEDURE — 80053 COMPREHEN METABOLIC PANEL: CPT | Performed by: EMERGENCY MEDICINE

## 2024-05-27 PROCEDURE — 93005 ELECTROCARDIOGRAM TRACING: CPT

## 2024-05-27 PROCEDURE — 70496 CT ANGIOGRAPHY HEAD: CPT

## 2024-05-27 PROCEDURE — 99284 EMERGENCY DEPT VISIT MOD MDM: CPT

## 2024-05-27 PROCEDURE — 96375 TX/PRO/DX INJ NEW DRUG ADDON: CPT

## 2024-05-27 PROCEDURE — 36415 COLL VENOUS BLD VENIPUNCTURE: CPT | Performed by: EMERGENCY MEDICINE

## 2024-05-27 PROCEDURE — 70498 CT ANGIOGRAPHY NECK: CPT

## 2024-05-27 PROCEDURE — 93010 ELECTROCARDIOGRAM REPORT: CPT | Performed by: INTERNAL MEDICINE

## 2024-05-27 PROCEDURE — 84484 ASSAY OF TROPONIN QUANT: CPT | Performed by: EMERGENCY MEDICINE

## 2024-05-27 RX ORDER — METHOCARBAMOL 500 MG/1
500 TABLET, FILM COATED ORAL ONCE
Status: COMPLETED | OUTPATIENT
Start: 2024-05-27 | End: 2024-05-27

## 2024-05-27 RX ORDER — MORPHINE SULFATE 10 MG/ML
6 INJECTION, SOLUTION INTRAMUSCULAR; INTRAVENOUS ONCE
Status: COMPLETED | OUTPATIENT
Start: 2024-05-27 | End: 2024-05-27

## 2024-05-27 RX ORDER — KETOROLAC TROMETHAMINE 30 MG/ML
15 INJECTION, SOLUTION INTRAMUSCULAR; INTRAVENOUS ONCE
Status: COMPLETED | OUTPATIENT
Start: 2024-05-27 | End: 2024-05-27

## 2024-05-27 RX ORDER — PREDNISONE 20 MG/1
TABLET ORAL
Qty: 17 TABLET | Refills: 0 | Status: SHIPPED | OUTPATIENT
Start: 2024-05-27 | End: 2024-06-07

## 2024-05-27 RX ORDER — ACETAMINOPHEN 325 MG/1
975 TABLET ORAL ONCE
Status: COMPLETED | OUTPATIENT
Start: 2024-05-27 | End: 2024-05-27

## 2024-05-27 RX ORDER — PREDNISONE 20 MG/1
60 TABLET ORAL ONCE
Status: COMPLETED | OUTPATIENT
Start: 2024-05-27 | End: 2024-05-27

## 2024-05-27 RX ADMIN — PREDNISONE 60 MG: 20 TABLET ORAL at 12:52

## 2024-05-27 RX ADMIN — IOHEXOL 100 ML: 350 INJECTION, SOLUTION INTRAVENOUS at 11:37

## 2024-05-27 RX ADMIN — ACETAMINOPHEN 975 MG: 325 TABLET, FILM COATED ORAL at 10:23

## 2024-05-27 RX ADMIN — MORPHINE SULFATE 6 MG: 10 INJECTION INTRAVENOUS at 11:13

## 2024-05-27 RX ADMIN — KETOROLAC TROMETHAMINE 15 MG: 30 INJECTION, SOLUTION INTRAMUSCULAR; INTRAVENOUS at 10:24

## 2024-05-27 RX ADMIN — METHOCARBAMOL 500 MG: 500 TABLET ORAL at 10:23

## 2024-05-27 NOTE — DISCHARGE INSTRUCTIONS
You had a normal EKG and cardiac enzymes today.  However if you have worsening chest pain or develop shortness of breath, vomiting, sweating, you must return to the emergency department.  Follow-up with PCP as directed.

## 2024-05-27 NOTE — ED PROVIDER NOTES
History  Chief Complaint   Patient presents with    Shoulder Pain     Starts in neck and goes down left arm to hand. Ongoing for 2 weeks causing difficulty sleeping.      Patient is a 53-year-old female with a history of asthma, NATALIA, thyroid disease who presents with left-sided neck pain.  Patient states that her pain started 2 to 3 weeks ago.  She denies any injury or inciting event.  She does state that she wears a heavy backpack but wears it on both shoulders.  The pain has been gradually worsening over the past couple of weeks.  Last night she was having difficulty sleeping due to pain.  She states the pain radiates from her left neck into her left shoulder and left upper extremity.  It also radiates into her left chest.  She has pain in her back with deep inspiration.  She went to a chiropractor on Wednesday, 5/22/2024 and had manipulation performed.  She also went to acupuncture without relief.  She has not taken any medication for the pain.  She denies numbness, tingling, weakness in her upper extremities.      History provided by:  Patient  Neck Pain  Pain location:  L side  Quality:  Shooting  Pain radiates to:  L shoulder and L arm  Pain severity:  Severe  Duration:  2 weeks  Timing:  Constant  Progression:  Worsening  Chronicity:  New  Worsened by:  Position  Associated symptoms: chest pain    Associated symptoms: no fever, no headaches, no numbness, no paresis, no photophobia, no tingling and no weakness        Prior to Admission Medications   Prescriptions Last Dose Informant Patient Reported? Taking?   Klor-Con M20 20 MEQ tablet  Self No No   Sig: TAKE 1 TABLET BY MOUTH EVERY DAY   Patient not taking: Reported on 8/1/2023   Proventil  (90 Base) MCG/ACT inhaler  Self No No   Sig: Inhale 2 puffs every 4 (four) hours as needed for wheezing   acyclovir (ZOVIRAX) 5 % ointment  Self No No   Sig: Apply topically every 3 (three) hours   albuterol (2.5 mg/3 mL) 0.083 % nebulizer solution  Self No No  "  Sig: Take 3 mL (2.5 mg total) by nebulization every 4 (four) hours as needed for wheezing or shortness of breath   budesonide-formoterol (SYMBICORT) 80-4.5 MCG/ACT inhaler  Self No No   Sig: Inhale 2 puffs 2 (two) times a day   furosemide (LASIX) 20 mg tablet  Self No No   Sig: TAKE 1 TABLET BY MOUTH DAILY AS NEEDED (EDEMA).   Patient not taking: Reported on 1/24/2024   guselkumab (Tremfya) subcutaneous injection  Self Yes No   levothyroxine 150 mcg tablet  Self No No   Sig: TAKE 1 TABLET BY MOUTH EVERY DAY   montelukast (SINGULAIR) 10 mg tablet  Self No No   Sig: TAKE 1 TABLET BY MOUTH DAILY AT BEDTIME   triamcinolone (KENALOG) 0.1 % cream  Self No No   Sig: Apply topically 2 (two) times a day   valACYclovir (VALTREX) 500 mg tablet  Self No No   Sig: TAKE 1 TABLET (500 MG TOTAL) BY MOUTH DAILY.      Facility-Administered Medications: None       Past Medical History:   Diagnosis Date    Acute pain of right shoulder     [id number] RESOLVED//seen by pain management    Allergic     Anesthesia     \"sometimes wakes up before procedure done ie, with MRI and EGD\"    Anxiety     [id number]  LAST ASSESSED    Arthritis     left knee    Asthma     allergy induced    Bulging of intervertebral disc between L4 and L5     Bursitis of right knee     better now    Depression     Ear infection     left PCP placed pt on antibiotic and ear drops    Elevated AST (SGOT)     [id number] RESOLVED    GERD (gastroesophageal reflux disease)     Goiter     [id number] RESOLVED//pt denies states has \"inflamed thyroid\"    Hiatal hernia     Hypothyroidism     [id number] RESOLVED//not on meds    Lipoma     left scapular area    Lump of axilla, right     [id number] RESOLVED marker clip in rt breast    Obstructive sleep apnea     [id number] RESOLVED    Psoriasis     Rash     under bilat breasts and folds of abdomen,, pt reports Dr Nairrrodrigo aware and dermatologist is treating/pt using ketoconazole cream as directed    Shortness of breath     \"with exertion\" "    Use of cane as ambulatory aid     occas       Past Surgical History:   Procedure Laterality Date    CARPAL TUNNEL RELEASE Bilateral     COLONOSCOPY      EGD      HYSTERECTOMY  12/30/2010    WITH ENDOMETERIAL ABLATION    KNEE SURGERY Left     NERVE BLOCK      PERIPH NERVE BLOCK WRIST MEDIAN W/LOCAL STEROID INFILTRAIT    PLANTAR FASCIA RELEASE Bilateral 2009, 2020 R    June 2020 on R    OH COLONOSCOPY FLX DX W/COLLJ SPEC WHEN PFRMD N/A 08/02/2018    Procedure: EGD AND COLONOSCOPY;  Surgeon: Mulugeta Gill MD;  Location: [facility];  Service: Gastroenterology    OH EXC B9 LESION MRGN XCP SK TG T/A/L 0.5 CM/< N/A 3/2/2023    Procedure: EXCISION WIDE LESION LEFT UPPER BACK;  Surgeon: Maya Chavez MD;  Location: [facility];  Service: General    OH FASCIECTOMY PLANTAR FASCIA PARTIAL SPX Right 06/09/2020    Procedure: RELEASE FASCIA PLANTAR/FASCIOTOMY ENDOSCOPIC (EPF);  Surgeon: Viraj Harkins DPM;  Location: [facility];  Service: Podiatry    OH RELEASE TARSAL TUNNEL Right 08/11/2020    Procedure: RELEASE TARSAL TUNNEL;  Surgeon: Viraj Harkins DPM;  Location: [facility];  Service: Podiatry    TONSILLECTOMY AND ADENOIDECTOMY         Family History   Problem Relation Age of Onset    Cirrhosis Mother     Hypertension Father     Nephrolithiasis Sister     Fibroids Sister     Anxiety disorder Daughter     Asthma Daughter     Heart disease Maternal Grandfather     Ovarian cancer Paternal Grandmother     Obesity Family     Uterine cancer Maternal Aunt     Fibroids Sister     Fibroids Sister     Ulcerative colitis Son      I have reviewed and agree with the history as documented.    E-Cigarette/Vaping    E-Cigarette Use Never User      E-Cigarette/Vaping Substances    Nicotine No     THC No     CBD No     Flavoring No     Other No     Unknown No      Social History     Tobacco Use    Smoking status: Never     Passive exposure: Never    Smokeless tobacco: Never   Vaping Use    Vaping status: Never Used   Substance Use  Topics    Alcohol use: Yes     Comment: very rare (1 drink once per month - if that)    Drug use: No       Review of Systems   Constitutional:  Negative for chills, diaphoresis and fever.   HENT:  Negative for nosebleeds, sore throat and trouble swallowing.    Eyes:  Negative for photophobia, pain and visual disturbance.   Respiratory:  Negative for cough, chest tightness and shortness of breath.    Cardiovascular:  Positive for chest pain. Negative for palpitations and leg swelling.   Gastrointestinal:  Negative for abdominal pain, constipation, diarrhea, nausea and vomiting.   Endocrine: Negative for polydipsia and polyuria.   Genitourinary:  Negative for difficulty urinating, dysuria, hematuria, pelvic pain, vaginal bleeding and vaginal discharge.   Musculoskeletal:  Positive for back pain and neck pain. Negative for neck stiffness.   Skin:  Negative for pallor and rash.   Neurological:  Negative for dizziness, tingling, seizures, weakness, light-headedness, numbness and headaches.   All other systems reviewed and are negative.      Physical Exam  Physical Exam  Vitals and nursing note reviewed.   Constitutional:       General: She is not in acute distress.     Appearance: She is well-developed.   HENT:      Head: Normocephalic and atraumatic.      Mouth/Throat:      Mouth: Oropharynx is clear and moist and mucous membranes are normal.   Eyes:      Extraocular Movements: EOM normal.      Pupils: Pupils are equal, round, and reactive to light.   Cardiovascular:      Rate and Rhythm: Normal rate and regular rhythm.      Pulses: Normal pulses.      Heart sounds: Normal heart sounds.   Pulmonary:      Effort: Pulmonary effort is normal. No respiratory distress.      Breath sounds: Normal breath sounds.   Chest:      Chest wall: Tenderness (left anterior/superior chest wall) present.   Abdominal:      General: There is no distension.      Palpations: Abdomen is soft. Abdomen is not rigid.      Tenderness: There is no  abdominal tenderness. There is no guarding or rebound.   Musculoskeletal:         General: No edema. Normal range of motion.      Left shoulder: Tenderness (anterior shoulder) present. Normal range of motion.      Cervical back: Normal range of motion and neck supple. No bony tenderness. Muscular tenderness (Tenderness to palpation in the left trapezius) present. No spinous process tenderness.      Thoracic back: Tenderness (tenderness to palpation in the left thoracic musculature medial to and overlying the scapula) present. No bony tenderness.      Lumbar back: No tenderness or bony tenderness.      Comments: Negative drop arm test, negative Diamond's test   Lymphadenopathy:      Cervical: No cervical adenopathy.   Skin:     General: Skin is warm and dry.      Capillary Refill: Capillary refill takes less than 2 seconds.   Neurological:      Mental Status: She is alert and oriented to person, place, and time.      Cranial Nerves: No cranial nerve deficit.      Sensory: No sensory deficit.   Psychiatric:         Mood and Affect: Mood and affect normal.         Vital Signs  ED Triage Vitals   Temperature Pulse Respirations Blood Pressure SpO2   05/27/24 0955 05/27/24 0955 05/27/24 0955 05/27/24 0957 05/27/24 0955   97.9 °F (36.6 °C) 101 20 165/91 97 %      Temp Source Heart Rate Source Patient Position - Orthostatic VS BP Location FiO2 (%)   05/27/24 0955 05/27/24 0955 05/27/24 0955 05/27/24 0955 --   Oral Monitor Sitting Right arm       Pain Score       05/27/24 0955       10 - Worst Possible Pain           Vitals:    05/27/24 0955 05/27/24 0957   BP:  165/91   Pulse: 101    Patient Position - Orthostatic VS: Sitting          Visual Acuity      ED Medications  Medications   predniSONE tablet 60 mg (has no administration in time range)   ketorolac (TORADOL) injection 15 mg (15 mg Intravenous Given 5/27/24 1024)   methocarbamol (ROBAXIN) tablet 500 mg (500 mg Oral Given 5/27/24 1023)   acetaminophen (TYLENOL) tablet 975  mg (975 mg Oral Given 5/27/24 1023)   morphine injection 6 mg (6 mg Intravenous Given 5/27/24 1113)   iohexol (OMNIPAQUE) 350 MG/ML injection (MULTI-DOSE) 100 mL (100 mL Intravenous Given 5/27/24 1137)       Diagnostic Studies  Results Reviewed       Procedure Component Value Units Date/Time    HS Troponin 0hr (reflex protocol) [[id number]]  (Normal) Collected: 05/27/24 1022    Lab Status: Final result Specimen: Blood from Arm, Right Updated: 05/27/24 1052     hs TnI 0hr <2 ng/L     Comprehensive metabolic panel [[id number]]  (Abnormal) Collected: 05/27/24 1022    Lab Status: Final result Specimen: Blood from Arm, Right Updated: 05/27/24 1044     Sodium 138 mmol/L      Potassium 3.8 mmol/L      Chloride 107 mmol/L      CO2 24 mmol/L      ANION GAP 7 mmol/L      BUN 19 mg/dL      Creatinine 0.79 mg/dL      Glucose 157 mg/dL      Calcium 9.1 mg/dL      AST 18 U/L      ALT 18 U/L      Alkaline Phosphatase 66 U/L      Total Protein 7.2 g/dL      Albumin 4.1 g/dL      Total Bilirubin 0.38 mg/dL      eGFR 85 ml/min/1.73sq m     Narrative:      National Kid[employer] Foundation guidelines for Chronic Kidney Disease (CKD):     Stage 1 with normal or high GFR (GFR > 90 mL/min/1.73 square meters)    Stage 2 Mild CKD (GFR = 60-89 mL/min/1.73 square meters)    Stage 3A Moderate CKD (GFR = 45-59 mL/min/1.73 square meters)    Stage 3B Moderate CKD (GFR = 30-44 mL/min/1.73 square meters)    Stage 4 Severe CKD (GFR = 15-29 mL/min/1.73 square meters)    Stage 5 End Stage CKD (GFR <15 mL/min/1.73 square meters)  Note: GFR calculation is accurate only with a steady state creatinine    CBC and differential [[id number]] Collected: 05/27/24 1022    Lab Status: Final result Specimen: Blood from Arm, Right Updated: 05/27/24 1031     WBC 6.97 Thousand/uL      RBC 4.97 Million/uL      Hemoglobin 14.0 g/dL      Hematocrit 43.3 %      MCV 87 fL      MCH 28.2 pg      MCHC 32.3 g/dL      RDW 13.3 %      MPV 10.3 fL      Platelets 201 Thousands/uL       nRBC 0 /100 WBCs      Segmented % 49 %      Immature Grans % 0 %      Lymphocytes % 39 %      Monocytes % 7 %      Eosinophils Relative 4 %      Basophils Relative 1 %      Absolute Neutrophils 3.42 Thousands/µL      Absolute Immature Grans 0.03 Thousand/uL      Absolute Lymphocytes 2.72 Thousands/µL      Absolute Monocytes 0.48 Thousand/µL      Eosinophils Absolute 0.27 Thousand/µL      Basophils Absolute 0.05 Thousands/µL                    XR chest 2 views   Final Result by Heidi Hahn MD (05/27 1153)      No acute cardiopulmonary disease.            Workstation performed: [id number]         CTA head and neck with and without contrast   Final Result by Andrey Villar DO (05/27 1200)      CT brain: No acute intracranial abnormality.      CT angiography: Unremarkable cervical and intracranial vasculature.                  Workstation performed: [id number]                    Procedures  ECG 12 Lead Documentation Only    Date/Time: 5/27/2024 10:34 AM    Performed by: Rodrigo Simon DO  Authorized by: Rodrigo Simon DO    ECG reviewed by me, the ED Provider: yes    Patient location:  ED  Previous ECG:     Previous ECG:  Compared to current    Similarity:  No change    Comparison to cardiac monitor: Yes    Comments:      Normal sinus rhythm at a rate of 78 bpm.  Normal intervals.  Normal axis.  Normal QRS.  No ST-T wave abnormalities.  Similar to previous from 7/16/2023.           ED Course  ED Course as of 05/27/24 1249   Mon May 27, 2024   1018 Pulse: 101  Suspect that this is secondary to pain as she states that her pain shot up to a 12 out of 10 when she sat in the stretcher.  Although she states that pain in back is worse with deep inspiration, I do not suspect PE.  Musculoskeletal etiology will worsen with deep inspiration as well.  Pain is reproducible to palpation.  Again, I have a very low suspicion for PE and do not believe that workup is indicated.   1110 No pain relief with  initial interventions.  Will give IV morphine.   1226 Patient states that her pain has improved and she is comfortable with discharge home.  She declines any additional analgesics.  However she was open to a course of prednisone as she has taken this previously for her asthma.  She will follow-up with PCP and comprehensive spine program.             HEART Risk Score      Flowsheet Row Most Recent Value   Heart Score Risk Calculator    History 0 Filed at: 05/27/2024 1245   ECG 0 Filed at: 05/27/2024 1245   Age 1 Filed at: 05/27/2024 1245   Risk Factors 1 Filed at: 05/27/2024 1245   Troponin 0 Filed at: 05/27/2024 1245   HEART Score 2 Filed at: 05/27/2024 1245                          SBIRT 20yo+      Flowsheet Row Most Recent Value   Initial Alcohol Screen: US AUDIT-C     1. How often do you have a drink containing alcohol? 2 Filed at: 05/27/2024 0956   2. How many drinks containing alcohol do you have on a typical day you are drinking?  1 Filed at: 05/27/2024 0956   3a. Male UNDER 65: How often do you have five or more drinks on one occasion? 0 Filed at: 05/27/2024 0956   3b. FEMALE Any Age, or MALE 65+: How often do you have 4 or more drinks on one occassion? 0 Filed at: 05/27/2024 0956   Audit-C Score 3 Filed at: 05/27/2024 0956   UZMA: How many times in the past year have you...    Used an illegal drug or used a prescription medication for non-medical reasons? Never Filed at: 05/27/2024 0956                      Medical Decision Making  Patient presents with neck pain radiating into her left clavicular region, left shoulder.  Patient initially denied any inciting event but does state that she has been moving things around her home.  She also stopped taking Tylenol and Motrin recently because she was diagnosed with an enlarged liver.  She wanted to stop taking medication prior to seeing a gastroenterologist.  Do not suspect ACS or other cardiopulmonary pathology.  CTA negative for cervical artery dissection.   "Suspect cervical radiculopathy as cause of symptoms.  Patient does report improvement with analgesics in the ED.  We discussed home therapies and she declines opioids.  She is willing and open to taking a steroid as she has tolerated these well in the past.  She declines muscle relaxers.  She will continue using heat and follow-up with her PCP.  Will also refer her to comprehensive spine program.  She is comfortable with discharge and is well-appearing at time of discharge.  Encouraged to return to ED immediately if she develops worsening pain, including chest pain, shortness of breath, weakness, other concerns.    Portions of the above record have been created with voice recognition software.  Occasional wrong word or \"sound alike\" substitutions may have occurred due to the inherent limitations of voice recognition software.  Read the chart carefully and recognize, using context, where substitutions may have occurred.      Problems Addressed:  Cervical radiculopathy:     Details: CTA negative for cervical artery dissection.  No evidence of acute fracture.  No neurologic symptoms concerning for cord compression.  Suspect cervical radiculopathy.  Will treat conservatively and have her follow-up with comprehensive spine program.  Chest pain:     Details: Radiating from her neck into her clavicle.  EKG and troponin do not indicate acute ischemia.  Do not feel that repeat troponins indicated.  Patient advised to follow-up with PCP and return to ED if symptoms worsen or she develops shortness of breath, sweating, nausea, other concerns.    Amount and/or Complexity of Data Reviewed  External Data Reviewed: ECG and notes.  Labs: ordered.  Radiology: ordered.    Risk  OTC drugs.  Prescription drug management.             Disposition  Final diagnoses:   Cervical radiculopathy   Chest pain     Time reflects when diagnosis was documented in both MDM as applicable and the Disposition within this note       Time User Action " Codes Description Comment    5/27/2024 12:17 PM Rodrigo Simon [M54.12] Cervical radiculopathy     5/27/2024 12:17 PM Rodrigo Simon [R07.9] Chest pain           ED Disposition       ED Disposition   Discharge    Condition   Stable    Date/Time   Mon May 27, 2024 1217    Comment   Екатерина Camacho discharge to home/self care.                   Follow-up Information       Follow up With Specialties Details Why Contact Info Additional Information    [facility], Internal Medicine, Nurse Practitioner Schedule an appointment as soon as possible for a visit  Return to ED if you develop worsening pain in your neck or chest or develop shortness of breath, vomiting, diaphoresis, numbness, tingling, weakness, other concerns. [address] [place] [address]  [phone]       [facility] Schedule an appointment as soon as possible for a visit   [phone]            Patient's Medications   Discharge Prescriptions    PREDNISONE 20 MG TABLET    Take 3 tablets (60 mg total) by mouth daily for 2 days, THEN 2 tablets (40 mg total) daily for 3 days, THEN 1 tablet (20 mg total) daily for 3 days, THEN 0.5 tablets (10 mg total) daily for 3 days.       Start Date: 5/27/2024 End Date: 6/7/2024       Order Dose: --       Quantity: 17 tablet    Refills: 0           PDMP Review         Value Time User    PDMP Reviewed  Yes 9/24/2020  1:36 AM Mikhail Lu MD            ED Provider  Electronically Signed by             Rodrigo Simon DO  05/27/24 3053

## 2024-05-29 ENCOUNTER — TELEPHONE (OUTPATIENT)
Dept: PHYSICAL THERAPY | Facility: OTHER | Age: 54
End: 2024-05-29

## 2024-05-29 NOTE — TELEPHONE ENCOUNTER
Call placed to the patient per [facility] referral.    Spoke with the patient who does not wish to go to PT for her neck pain. She states she was seen by PM/Dr Mas for her neck. However, in her chart it looks to be for her low back/groin/hip, and left thoracic per chart notes from consult on 2/29/24. She was upset that she was sent to PT originally for something other than her neck pain. Looks to be a possible self referral to PM from 2/2024, no DX on that referral. Prior to that was last seen in [facility] in 2019.     Pt will follow up with her PCP     Comp spine closed

## 2024-06-13 DIAGNOSIS — M25.519 ACUTE SHOULDER PAIN, UNSPECIFIED LATERALITY: Primary | ICD-10-CM

## 2024-06-13 RX ORDER — METHYLPREDNISOLONE 4 MG/1
TABLET ORAL
Qty: 21 EACH | Refills: 0 | Status: SHIPPED | OUTPATIENT
Start: 2024-06-13

## 2024-06-19 ENCOUNTER — OFFICE VISIT (OUTPATIENT)
Dept: OBGYN CLINIC | Facility: CLINIC | Age: 54
End: 2024-06-19
Payer: COMMERCIAL

## 2024-06-19 ENCOUNTER — APPOINTMENT (OUTPATIENT)
Dept: RADIOLOGY | Facility: CLINIC | Age: 54
End: 2024-06-19
Payer: COMMERCIAL

## 2024-06-19 VITALS — HEIGHT: 63 IN | BODY MASS INDEX: 51.91 KG/M2 | WEIGHT: 293 LBS

## 2024-06-19 DIAGNOSIS — M25.512 LEFT SHOULDER PAIN, UNSPECIFIED CHRONICITY: ICD-10-CM

## 2024-06-19 DIAGNOSIS — S29.011A STRAIN OF LEFT PECTORALIS MUSCLE, INITIAL ENCOUNTER: Primary | ICD-10-CM

## 2024-06-19 DIAGNOSIS — M25.512 ACUTE PAIN OF LEFT SHOULDER: ICD-10-CM

## 2024-06-19 PROCEDURE — 73030 X-RAY EXAM OF SHOULDER: CPT

## 2024-06-19 PROCEDURE — 99203 OFFICE O/P NEW LOW 30 MIN: CPT | Performed by: ORTHOPAEDIC SURGERY

## 2024-06-19 RX ORDER — MELOXICAM 15 MG/1
15 TABLET ORAL DAILY
Qty: 30 TABLET | Refills: 1 | Status: SHIPPED | OUTPATIENT
Start: 2024-06-19

## 2024-06-19 NOTE — PROGRESS NOTES
Assessment/Plan:  1. Strain of left pectoralis muscle, initial encounter  Ambulatory Referral to Physical Therapy    meloxicam (Mobic) 15 mg tablet      2. Acute pain of left shoulder  XR shoulder 2+ vw left    Ambulatory Referral to Physical Therapy    meloxicam (Mobic) 15 mg tablet        Scribe Attestation    I,:  Rubina Mercedes am acting as a scribe while in the presence of the attending physician.:       I,:  Ej Peterson MD personally performed the services described in this documentation    as scribed in my presence.:             Екатерина is a pleasant 54 y.o. female who presents for initial evaluation of the left shoulder. I believe her symptoms are consistent with a clavicular head of the pectoralis major strain. I offered her a referral to physical therapy for the shoulder today. I offered her a prescription of meloxicam 15 mg for the inflammation and pain she is experiencing. She may also use Voltaren gel topically on the shoulder.  She may apply ice as needed for pain control, as well. She will follow-up in 4 to 6 weeks for re-evaluation of the left shoulder.    Subjective:   Екатерина Camacho is a 54 y.o. female who presents for initial evaluation of the left shoulder. She was recently seen in the ED on 5/27/2024 for cervical radiculopathy of the left side. At that time, she was experiencing anterior left shoulder pain, as she states she is today.  She was moving boxes about a month and a half ago.  She was lifting heavy boxes during this time.  Did not feel a pop or anything but did have anterior clavicle pain.  Pain radiates from her midshaft clavicle over to her shoulder in the region of her pectoralis major, clavicular head.  She did notice some swelling in the area as well.  She is no significant radicular symptoms.  Strength is maintained.      Review of Systems   Constitutional:  Positive for activity change. Negative for chills and fever.   HENT:  Negative for ear pain and sore throat.    Eyes:   "Negative for pain and visual disturbance.   Respiratory:  Negative for cough and shortness of breath.    Cardiovascular:  Negative for chest pain and palpitations.   Gastrointestinal:  Negative for abdominal pain and vomiting.   Genitourinary:  Negative for dysuria and hematuria.   Musculoskeletal:  Positive for arthralgias, myalgias and neck pain. Negative for back pain.   Skin:  Negative for color change and rash.   Neurological:  Negative for seizures and syncope.   All other systems reviewed and are negative.        Past Medical History:   Diagnosis Date   • Acute pain of right shoulder     [id number] RESOLVED//seen by pain management   • Allergic    • Anesthesia     \"sometimes wakes up before procedure done ie, with MRI and EGD\"   • Anxiety     [id number]  LAST ASSESSED   • Arthritis     left knee   • Asthma     allergy induced   • Bulging of intervertebral disc between L4 and L5    • Bursitis of right knee     better now   • Depression    • Ear infection     left PCP placed pt on antibiotic and ear drops   • Elevated AST (SGOT)     [id number] RESOLVED   • GERD (gastroesophageal reflux disease)    • Goiter     [id number] RESOLVED//pt denies states has \"inflamed thyroid\"   • Hiatal hernia    • Hypothyroidism     [id number] RESOLVED//not on meds   • Lipoma     left scapular area   • Lump of axilla, right     [id number] RESOLVED marker clip in rt breast   • Obstructive sleep apnea     [id number] RESOLVED   • Psoriasis    • Rash     under bilat breasts and folds of abdomen,, pt reports Dr Nairrrodrigo aware and dermatologist is treating/pt using ketoconazole cream as directed   • Shortness of breath     \"with exertion\"   • Use of cane as ambulatory aid     occas       Past Surgical History:   Procedure Laterality Date   • CARPAL TUNNEL RELEASE Bilateral    • COLONOSCOPY     • EGD     • HYSTERECTOMY  12/30/2010    WITH ENDOMETERIAL ABLATION   • KNEE SURGERY Left    • NERVE BLOCK      PERIPH NERVE BLOCK WRIST MEDIAN W/LOCAL " STEROID INFILTRAIT   • PLANTAR FASCIA RELEASE Bilateral 2009, 2020 R    June 2020 on R   • DE COLONOSCOPY FLX DX W/COLLJ SPEC WHEN PFRMD N/A 08/02/2018    Procedure: EGD AND COLONOSCOPY;  Surgeon: Mulugeta Gill MD;  Location: [facility];  Service: Gastroenterology   • DE EXC B9 LESION MRGN XCP SK TG T/A/L 0.5 CM/< N/A 3/2/2023    Procedure: EXCISION WIDE LESION LEFT UPPER BACK;  Surgeon: Maya Chavez MD;  Location: [facility];  Service: General   • DE FASCIECTOMY PLANTAR FASCIA PARTIAL SPX Right 06/09/2020    Procedure: RELEASE FASCIA PLANTAR/FASCIOTOMY ENDOSCOPIC (EPF);  Surgeon: Viraj Harkins DPM;  Location: [facility];  Service: Podiatry   • DE RELEASE TARSAL TUNNEL Right 08/11/2020    Procedure: RELEASE TARSAL TUNNEL;  Surgeon: Viraj Harkins DPM;  Location: [facility];  Service: Podiatry   • TONSILLECTOMY AND ADENOIDECTOMY         Family History   Problem Relation Age of Onset   • Cirrhosis Mother    • Hypertension Father    • Nephrolithiasis Sister    • Fibroids Sister    • Anxiety disorder Daughter    • Asthma Daughter    • Heart disease Maternal Grandfather    • Ovarian cancer Paternal Grandmother    • Obesity Family    • Uterine cancer Maternal Aunt    • Fibroids Sister    • Fibroids Sister    • Ulcerative colitis Son        Social History     Occupational History   • Not on file   Tobacco Use   • Smoking status: Never     Passive exposure: Never   • Smokeless tobacco: Never   Vaping Use   • Vaping status: Never Used   Substance and Sexual Activity   • Alcohol use: Yes     Comment: very rare (1 drink once per month - if that)   • Drug use: No   • Sexual activity: Not on file         Current Outpatient Medications:   •  acyclovir (ZOVIRAX) 5 % ointment, Apply topically every 3 (three) hours, Disp: 30 g, Rfl: 3  •  albuterol (2.5 mg/3 mL) 0.083 % nebulizer solution, Take 3 mL (2.5 mg total) by nebulization every 4 (four) hours as needed for wheezing or shortness of breath, Disp: 75 mL, Rfl: 1  •   budesonide-formoterol (SYMBICORT) 80-4.5 MCG/ACT inhaler, Inhale 2 puffs 2 (two) times a day, Disp: 10.2 g, Rfl: 3  •  guselkumab (Tremfya) subcutaneous injection, , Disp: , Rfl:   •  levothyroxine 150 mcg tablet, TAKE 1 TABLET BY MOUTH EVERY DAY, Disp: 90 tablet, Rfl: 1  •  meloxicam (Mobic) 15 mg tablet, Take 1 tablet (15 mg total) by mouth daily, Disp: 30 tablet, Rfl: 1  •  methylPREDNISolone 4 MG tablet therapy pack, Use as directed on package, Disp: 21 each, Rfl: 0  •  montelukast (SINGULAIR) 10 mg tablet, TAKE 1 TABLET BY MOUTH DAILY AT BEDTIME, Disp: 90 tablet, Rfl: 1  •  Proventil  (90 Base) MCG/ACT inhaler, Inhale 2 puffs every 4 (four) hours as needed for wheezing, Disp: 20.1 g, Rfl: 5  •  triamcinolone (KENALOG) 0.1 % cream, Apply topically 2 (two) times a day, Disp: 30 g, Rfl: 1  •  furosemide (LASIX) 20 mg tablet, TAKE 1 TABLET BY MOUTH DAILY AS NEEDED (EDEMA). (Patient not taking: Reported on 1/24/2024), Disp: 90 tablet, Rfl: 1  •  Klor-Con M20 20 MEQ tablet, TAKE 1 TABLET BY MOUTH EVERY DAY (Patient not taking: Reported on 8/1/2023), Disp: 90 tablet, Rfl: 2  •  valACYclovir (VALTREX) 500 mg tablet, TAKE 1 TABLET (500 MG TOTAL) BY MOUTH DAILY., Disp: 90 tablet, Rfl: 1    Allergies   Allergen Reactions   • Chlorhexidine Rash       Objective:  Vitals:       Right Shoulder Exam     Range of Motion   External rotation:  40   Forward flexion:  150       Left Shoulder Exam     Tenderness   The patient is experiencing tenderness in the clavicle.    Range of Motion   External rotation:  40   Forward flexion:  140   Internal rotation 0 degrees:  L5     Muscle Strength   Abduction: 5/5   Internal rotation: 4/5   External rotation: 5/5   Supraspinatus: 5/5   Subscapularis: 5/5   Biceps: 5/5     Other   Erythema: absent  Scars: absent  Sensation: normal  Pulse: present     Comments:  4/5 adduction  Pain with Belly Press  Pain with internal rotation and pec fly maneuver  Mild swelling about the pectoralis  major, clavicular head origin on the midshaft clavicle.            Physical Exam  Vitals and nursing note reviewed.   Constitutional:       Appearance: Normal appearance.   HENT:      Head: Normocephalic and atraumatic.      Right Ear: External ear normal.      Left Ear: External ear normal.      Nose: Nose normal.   Eyes:      General: No scleral icterus.     Extraocular Movements: Extraocular movements intact.      Conjunctiva/sclera: Conjunctivae normal.   Cardiovascular:      Rate and Rhythm: Normal rate.   Pulmonary:      Effort: Pulmonary effort is normal. No respiratory distress.   Musculoskeletal:      Cervical back: Normal range of motion and neck supple.      Comments: See ortho exam   Skin:     General: Skin is warm and dry.   Neurological:      Mental Status: She is alert and oriented to person, place, and time.   Psychiatric:         Mood and Affect: Mood normal.         Behavior: Behavior normal.         I have personally reviewed pertinent films in PACS and my interpretation is as follows:  X-rays of the left shoulder obtained in the office today demonstrate no significant degenerative changes. No acute fractures or dislocations.      This document was created using speech voice recognition software.   Grammatical errors, random word insertions, pronoun errors, and incomplete sentences are an occasional consequence of this system due to software limitations, ambient noise, and hardware issues.   Any formal questions or concerns about content, text, or information contained within the body of this dictation should be directly addressed to the provider for clarification.

## 2024-06-29 ENCOUNTER — HOSPITAL ENCOUNTER (EMERGENCY)
Facility: HOSPITAL | Age: 54
Discharge: HOME | End: 2024-06-30
Attending: EMERGENCY MEDICINE
Payer: COMMERCIAL

## 2024-06-29 ENCOUNTER — APPOINTMENT (EMERGENCY)
Dept: RADIOLOGY | Facility: HOSPITAL | Age: 54
End: 2024-06-29
Payer: COMMERCIAL

## 2024-06-29 ENCOUNTER — APPOINTMENT (EMERGENCY)
Dept: CT IMAGING | Facility: HOSPITAL | Age: 54
End: 2024-06-29
Payer: COMMERCIAL

## 2024-06-29 DIAGNOSIS — R07.9 CHEST PAIN: Primary | ICD-10-CM

## 2024-06-29 DIAGNOSIS — R79.89 ELEVATED TSH: ICD-10-CM

## 2024-06-29 LAB
ALBUMIN SERPL BCG-MCNC: 4.1 G/DL (ref 3.5–5)
ALP SERPL-CCNC: 61 U/L (ref 34–104)
ALT SERPL W P-5'-P-CCNC: 23 U/L (ref 7–52)
ANION GAP SERPL CALCULATED.3IONS-SCNC: 8 MMOL/L (ref 4–13)
AST SERPL W P-5'-P-CCNC: 20 U/L (ref 13–39)
ATRIAL RATE: 89 BPM
BASOPHILS # BLD AUTO: 0.05 THOUSANDS/ÂΜL (ref 0–0.1)
BASOPHILS NFR BLD AUTO: 1 % (ref 0–1)
BILIRUB SERPL-MCNC: 0.6 MG/DL (ref 0.2–1)
BNP SERPL-MCNC: 10 PG/ML (ref 0–100)
BUN SERPL-MCNC: 22 MG/DL (ref 5–25)
CALCIUM SERPL-MCNC: 9.5 MG/DL (ref 8.4–10.2)
CARDIAC TROPONIN I PNL SERPL HS: 3 NG/L
CHLORIDE SERPL-SCNC: 106 MMOL/L (ref 96–108)
CO2 SERPL-SCNC: 24 MMOL/L (ref 21–32)
CREAT SERPL-MCNC: 0.83 MG/DL (ref 0.6–1.3)
D DIMER PPP FEU-MCNC: 0.42 UG/ML FEU
EOSINOPHIL # BLD AUTO: 0.23 THOUSAND/ÂΜL (ref 0–0.61)
EOSINOPHIL NFR BLD AUTO: 3 % (ref 0–6)
ERYTHROCYTE [DISTWIDTH] IN BLOOD BY AUTOMATED COUNT: 13.8 % (ref 11.6–15.1)
GFR SERPL CREATININE-BSD FRML MDRD: 80 ML/MIN/1.73SQ M
GLUCOSE SERPL-MCNC: 103 MG/DL (ref 65–140)
HCT VFR BLD AUTO: 41.3 % (ref 34.8–46.1)
HGB BLD-MCNC: 13.5 G/DL (ref 11.5–15.4)
IMM GRANULOCYTES # BLD AUTO: 0.03 THOUSAND/UL (ref 0–0.2)
IMM GRANULOCYTES NFR BLD AUTO: 0 % (ref 0–2)
LYMPHOCYTES # BLD AUTO: 3.67 THOUSANDS/ÂΜL (ref 0.6–4.47)
LYMPHOCYTES NFR BLD AUTO: 40 % (ref 14–44)
MCH RBC QN AUTO: 28.4 PG (ref 26.8–34.3)
MCHC RBC AUTO-ENTMCNC: 32.7 G/DL (ref 31.4–37.4)
MCV RBC AUTO: 87 FL (ref 82–98)
MONOCYTES # BLD AUTO: 0.72 THOUSAND/ÂΜL (ref 0.17–1.22)
MONOCYTES NFR BLD AUTO: 8 % (ref 4–12)
NEUTROPHILS # BLD AUTO: 4.58 THOUSANDS/ÂΜL (ref 1.85–7.62)
NEUTS SEG NFR BLD AUTO: 48 % (ref 43–75)
NRBC BLD AUTO-RTO: 0 /100 WBCS
P AXIS: 56 DEGREES
PLATELET # BLD AUTO: 206 THOUSANDS/UL (ref 149–390)
PMV BLD AUTO: 10.3 FL (ref 8.9–12.7)
POTASSIUM SERPL-SCNC: 3.7 MMOL/L (ref 3.5–5.3)
PR INTERVAL: 168 MS
PROT SERPL-MCNC: 6.8 G/DL (ref 6.4–8.4)
QRS AXIS: 87 DEGREES
QRSD INTERVAL: 98 MS
QT INTERVAL: 362 MS
QTC INTERVAL: 440 MS
RBC # BLD AUTO: 4.75 MILLION/UL (ref 3.81–5.12)
SODIUM SERPL-SCNC: 138 MMOL/L (ref 135–147)
T WAVE AXIS: 44 DEGREES
TSH SERPL DL<=0.05 MIU/L-ACNC: 8.16 UIU/ML (ref 0.45–4.5)
VENTRICULAR RATE: 89 BPM
WBC # BLD AUTO: 9.28 THOUSAND/UL (ref 4.31–10.16)

## 2024-06-29 PROCEDURE — 99285 EMERGENCY DEPT VISIT HI MDM: CPT

## 2024-06-29 PROCEDURE — 93005 ELECTROCARDIOGRAM TRACING: CPT

## 2024-06-29 PROCEDURE — 99285 EMERGENCY DEPT VISIT HI MDM: CPT | Performed by: EMERGENCY MEDICINE

## 2024-06-29 PROCEDURE — 85025 COMPLETE CBC W/AUTO DIFF WBC: CPT

## 2024-06-29 PROCEDURE — 71275 CT ANGIOGRAPHY CHEST: CPT

## 2024-06-29 PROCEDURE — 83880 ASSAY OF NATRIURETIC PEPTIDE: CPT

## 2024-06-29 PROCEDURE — 36415 COLL VENOUS BLD VENIPUNCTURE: CPT

## 2024-06-29 PROCEDURE — 80053 COMPREHEN METABOLIC PANEL: CPT

## 2024-06-29 PROCEDURE — 84443 ASSAY THYROID STIM HORMONE: CPT

## 2024-06-29 PROCEDURE — 96374 THER/PROPH/DIAG INJ IV PUSH: CPT

## 2024-06-29 PROCEDURE — 84484 ASSAY OF TROPONIN QUANT: CPT

## 2024-06-29 PROCEDURE — 71045 X-RAY EXAM CHEST 1 VIEW: CPT

## 2024-06-29 PROCEDURE — 74174 CTA ABD&PLVS W/CONTRAST: CPT

## 2024-06-29 PROCEDURE — 85379 FIBRIN DEGRADATION QUANT: CPT

## 2024-06-29 PROCEDURE — 84439 ASSAY OF FREE THYROXINE: CPT

## 2024-06-29 RX ORDER — LORAZEPAM 2 MG/ML
1 INJECTION INTRAMUSCULAR ONCE
Status: COMPLETED | OUTPATIENT
Start: 2024-06-29 | End: 2024-06-29

## 2024-06-29 RX ADMIN — LORAZEPAM 1 MG: 2 INJECTION INTRAMUSCULAR; INTRAVENOUS at 23:20

## 2024-06-29 RX ADMIN — IOHEXOL 100 ML: 350 INJECTION, SOLUTION INTRAVENOUS at 23:41

## 2024-06-30 VITALS
TEMPERATURE: 97.5 F | SYSTOLIC BLOOD PRESSURE: 153 MMHG | DIASTOLIC BLOOD PRESSURE: 79 MMHG | HEART RATE: 84 BPM | OXYGEN SATURATION: 93 % | BODY MASS INDEX: 51.91 KG/M2 | HEIGHT: 63 IN | WEIGHT: 293 LBS | RESPIRATION RATE: 16 BRPM

## 2024-06-30 LAB
2HR DELTA HS TROPONIN: 0 NG/L
CARDIAC TROPONIN I PNL SERPL HS: 3 NG/L
T4 FREE SERPL-MCNC: 0.74 NG/DL (ref 0.61–1.12)

## 2024-06-30 PROCEDURE — 36415 COLL VENOUS BLD VENIPUNCTURE: CPT

## 2024-06-30 PROCEDURE — 84484 ASSAY OF TROPONIN QUANT: CPT

## 2024-06-30 NOTE — DISCHARGE INSTRUCTIONS
Please come back to the ER if you have any new or worsening symptoms including worsening chest pain, shortness of breath, palpitations, or feel like you are going to pass out.    Please follow up with your PCP regarding elevated TSH.    Please follow-up with your cardiologist.

## 2024-06-30 NOTE — ED NOTES
Per Dr. Lubin Pt can wait for labs to result before going to CT D/T low suspicion on dissection. Awaiting further results. Will continue to monitor.     Kari Bailey RN  06/29/24 0433

## 2024-06-30 NOTE — ED NOTES
Pt resting comfortably on stretcher. Respirations noted to be even and unlabored with no distress noted at this time. Pt's S/O remains at bedside. Will continue to monitor.      Kari Bailey RN  06/30/24 0141

## 2024-06-30 NOTE — ED ATTENDING ATTESTATION
6/29/2024  I, Mikhail Lu MD, saw and evaluated the patient. I have discussed the patient with the resident/non-physician practitioner and agree with the resident's/non-physician practitioner's findings, Plan of Care, and MDM as documented in the resident's/non-physician practitioner's note, except where noted. All available labs and Radiology studies were reviewed.  I was present for key portions of any procedure(s) performed by the resident/non-physician practitioner and I was immediately available to provide assistance.       At this point I agree with the current assessment done in the Emergency Department.  I have conducted an independent evaluation of this patient a history and physical is as follows: Patient is a 54 year old female with chest pain with radiation to the back and neck with sweating and palpitations today. No fever. No cough. Was in [place] from 6/5/24 to 6/19/24 and drove. Grandfather had PE. No smoking. No N/V. Was last seen at [facility] in [place] on 6/19/24 for strain of left pectoralis muscle. [facility] website checked on this patient and no Rx found. NCAT. Moist mucous membranes. No scleral icterus. Lung s clear. Heart regular without murmur. Nontender chest wall. Abdomen soft and nontender. Good bowel sounds. Mild LE edema bilaterally. No rash noted. DDX including but not limited to: ACS, MI, PE, PTX, pneumonia, dissection, pleurisy, pericarditis, myocarditis, doubt rhabdomyolysis; GI etiology, thyroid disease, metabolic abnormality, anxiety.   Will check EKG, labs, CXR and CT dissection study.     ED Course         Critical Care Time  Procedures

## 2024-06-30 NOTE — ED PROVIDER NOTES
History  Chief Complaint   Patient presents with    Chest Pain     Patient was sitting on the couch watching TV and had sudden onset diaphoresis and pain across back and neck, high heart rate, and back pain. States she has been sweating more than normal today and SOB, with chest tightness     Patient is a 55-year-old female presents to the ED for chest pain.  Patient stated that at approximately 9:30 PM tonight she started having acute onset of chest pain over her right breast while at rest on the couch watching TV.  Patient stated that the pain lasted approximately 30 seconds and resolved on its own.  Pain radiated to the back.  States that she has never had chest pain with this before.  8/10 intensity.  During this time she also felt diaphoretic and flushed.  States that her Apple Watch recorded her as having a .  Approximately 30 minutes later she had a subsequent episode of diaphoresis and palpitations although no chest pain at this time.  She also endorses that earlier in the day she was cleaning windows when she felt like she was getting very diaphoretic out of proportion to the level of physical activity that she was doing.  States that she has asthma but she has had some shortness of breath today that does not feel like usual asthma shortness of breath but is mild.        Prior to Admission Medications   Prescriptions Last Dose Informant Patient Reported? Taking?   Klor-Con M20 20 MEQ tablet  Self No No   Sig: TAKE 1 TABLET BY MOUTH EVERY DAY   Patient not taking: Reported on 8/1/2023   Proventil  (90 Base) MCG/ACT inhaler  Self No No   Sig: Inhale 2 puffs every 4 (four) hours as needed for wheezing   acyclovir (ZOVIRAX) 5 % ointment  Self No No   Sig: Apply topically every 3 (three) hours   albuterol (2.5 mg/3 mL) 0.083 % nebulizer solution  Self No No   Sig: Take 3 mL (2.5 mg total) by nebulization every 4 (four) hours as needed for wheezing or shortness of breath   budesonide-formoterol  "(SYMBICORT) 80-4.5 MCG/ACT inhaler  Self No No   Sig: Inhale 2 puffs 2 (two) times a day   furosemide (LASIX) 20 mg tablet  Self No No   Sig: TAKE 1 TABLET BY MOUTH DAILY AS NEEDED (EDEMA).   Patient not taking: Reported on 1/24/2024   guselkumab (Tremfya) subcutaneous injection  Self Yes No   levothyroxine 150 mcg tablet  Self No No   Sig: TAKE 1 TABLET BY MOUTH EVERY DAY   meloxicam (Mobic) 15 mg tablet   No No   Sig: Take 1 tablet (15 mg total) by mouth daily   methylPREDNISolone 4 MG tablet therapy pack   No No   Sig: Use as directed on package   montelukast (SINGULAIR) 10 mg tablet  Self No No   Sig: TAKE 1 TABLET BY MOUTH DAILY AT BEDTIME   triamcinolone (KENALOG) 0.1 % cream  Self No No   Sig: Apply topically 2 (two) times a day   valACYclovir (VALTREX) 500 mg tablet  Self No No   Sig: TAKE 1 TABLET (500 MG TOTAL) BY MOUTH DAILY.      Facility-Administered Medications: None       Past Medical History:   Diagnosis Date    Acute pain of right shoulder     [id number] RESOLVED//seen by pain management    Allergic     Anesthesia     \"sometimes wakes up before procedure done ie, with MRI and EGD\"    Anxiety     [id number]  LAST ASSESSED    Arthritis     left knee    Asthma     allergy induced    Bulging of intervertebral disc between L4 and L5     Bursitis of right knee     better now    Depression     Ear infection     left PCP placed pt on antibiotic and ear drops    Elevated AST (SGOT)     [id number] RESOLVED    GERD (gastroesophageal reflux disease)     Goiter     [id number] RESOLVED//pt denies states has \"inflamed thyroid\"    Hiatal hernia     Hypothyroidism     [id number] RESOLVED//not on meds    Lipoma     left scapular area    Lump of axilla, right     [id number] RESOLVED marker clip in rt breast    Obstructive sleep apnea     [id number] RESOLVED    Psoriasis     Rash     under bilat breasts and folds of abdomen,, pt reports Dr Nairrrodrigo aware and dermatologist is treating/pt using ketoconazole cream as directed    " "Shortness of breath     \"with exertion\"    Use of cane as ambulatory aid     occas       Past Surgical History:   Procedure Laterality Date    CARPAL TUNNEL RELEASE Bilateral     COLONOSCOPY      EGD      HYSTERECTOMY  12/30/2010    WITH ENDOMETERIAL ABLATION    KNEE SURGERY Left     NERVE BLOCK      PERIPH NERVE BLOCK WRIST MEDIAN W/LOCAL STEROID INFILTRAIT    PLANTAR FASCIA RELEASE Bilateral 2009, 2020 R    June 2020 on R    CA COLONOSCOPY FLX DX W/COLLJ SPEC WHEN PFRMD N/A 08/02/2018    Procedure: EGD AND COLONOSCOPY;  Surgeon: Mulugeta Gill MD;  Location: [facility];  Service: Gastroenterology    CA EXC B9 LESION MRGN XCP SK TG T/A/L 0.5 CM/< N/A 3/2/2023    Procedure: EXCISION WIDE LESION LEFT UPPER BACK;  Surgeon: Maya Chavez MD;  Location: [facility];  Service: General    CA FASCIECTOMY PLANTAR FASCIA PARTIAL SPX Right 06/09/2020    Procedure: RELEASE FASCIA PLANTAR/FASCIOTOMY ENDOSCOPIC (EPF);  Surgeon: Viraj Harkins DPM;  Location: [facility];  Service: Podiatry    CA RELEASE TARSAL TUNNEL Right 08/11/2020    Procedure: RELEASE TARSAL TUNNEL;  Surgeon: Viraj Harkins DPM;  Location: [facility];  Service: Podiatry    TONSILLECTOMY AND ADENOIDECTOMY         Family History   Problem Relation Age of Onset    Cirrhosis Mother     Hypertension Father     Nephrolithiasis Sister     Fibroids Sister     Anxiety disorder Daughter     Asthma Daughter     Heart disease Maternal Grandfather     Ovarian cancer Paternal Grandmother     Obesity Family     Uterine cancer Maternal Aunt     Fibroids Sister     Fibroids Sister     Ulcerative colitis Son      I have reviewed and agree with the history as documented.    E-Cigarette/Vaping    E-Cigarette Use Never User      E-Cigarette/Vaping Substances    Nicotine No     THC No     CBD No     Flavoring No     Other No     Unknown No      Social History     Tobacco Use    Smoking status: Never     Passive exposure: Never    Smokeless tobacco: Never   Vaping Use    Vaping " status: Never Used   Substance Use Topics    Alcohol use: Yes     Comment: very rare (1 drink once per month - if that)    Drug use: No        Review of Systems   Constitutional:  Positive for diaphoresis. Negative for chills, fatigue and fever.   HENT: Negative.     Respiratory:  Positive for shortness of breath. Negative for cough.    Cardiovascular:  Positive for chest pain and palpitations.   Gastrointestinal:  Negative for abdominal pain, nausea and vomiting.   Genitourinary: Negative.    Skin:  Negative for color change and pallor.   Neurological:  Negative for syncope and headaches.   Psychiatric/Behavioral: Negative.     All other systems reviewed and are negative.      Physical Exam  ED Triage Vitals   Temperature Pulse Respirations Blood Pressure SpO2   06/29/24 2258 06/29/24 2245 06/29/24 2245 06/29/24 2245 06/29/24 2245   97.5 °F (36.4 °C) 88 18 (!) 175/94 95 %      Temp Source Heart Rate Source Patient Position - Orthostatic VS BP Location FiO2 (%)   06/29/24 2258 06/29/24 2245 06/29/24 2245 06/29/24 2245 --   Oral Monitor Lying Right arm       Pain Score       06/29/24 2258       No Pain             Orthostatic Vital Signs  Vitals:    06/30/24 0124 06/30/24 0139 06/30/24 0145 06/30/24 0200   BP: 168/70  147/90 153/79   Pulse: 86 83 86 84   Patient Position - Orthostatic VS:   Lying        Physical Exam  On examination:  The patient is awake, alert and oriented  HEENT: Normocephalic/atraumatic  External examination of the ears is unremarkable  Pupils are equal round and reactive to light, there is no conjunctival injection or scleral icterus noted  Nares are patent without rhinorrhea.  The oropharynx is moist without injection  The neck is supple  Lungs: Clear to auscultation bilaterally  Heart: Regular without murmurs rubs or gallops  Abdomen: Soft and nontender. There are positive bowel sounds. there is no rebound or guarding  Musculoskeletal: Normal range of motion with grossly normal  "strength  Neuro: Cranial nerves II through XII grossly intact. Nonfocal exam  Skin: No rash noted  Psych: Mood and affect normal      ED Medications  Medications   LORazepam (ATIVAN) injection 1 mg (1 mg Intravenous Given 6/29/24 2320)   iohexol (OMNIPAQUE) 350 MG/ML injection (MULTI-DOSE) 100 mL (100 mL Intravenous Given 6/29/24 2341)       Diagnostic Studies  Results Reviewed       Procedure Component Value Units Date/Time    T4, free [[id number]]  (Normal) Collected: 06/29/24 2259    Lab Status: Final result Specimen: Blood from Arm, Right Updated: 06/30/24 0453     Free T4 0.74 ng/dL     Narrative:        \"Therapeutic range for patients medicated with thyroid disorders: 0.61-1.24 ng/dL.\"    HS Troponin I 2hr [[id number]]  (Normal) Collected: 06/30/24 0122    Lab Status: Final result Specimen: Blood from Arm, Right Updated: 06/30/24 0155     hs TnI 2hr 3 ng/L      Delta 2hr hsTnI 0 ng/L     B-Type Natriuretic Peptide(BNP) [[id number]]  (Normal) Collected: 06/29/24 2259    Lab Status: Final result Specimen: Blood from Arm, Right Updated: 06/29/24 2350     BNP 10 pg/mL     TSH, 3rd generation with Free T4 reflex [[id number]]  (Abnormal) Collected: 06/29/24 2259    Lab Status: Final result Specimen: Blood from Arm, Right Updated: 06/29/24 2342     TSH 3RD GENERATON 8.156 uIU/mL     HS Troponin 0hr (reflex protocol) [[id number]]  (Normal) Collected: 06/29/24 2259    Lab Status: Final result Specimen: Blood from Arm, Right Updated: 06/29/24 2334     hs TnI 0hr 3 ng/L     Comprehensive metabolic panel [[id number]] Collected: 06/29/24 2259    Lab Status: Final result Specimen: Blood from Arm, Right Updated: 06/29/24 2333     Sodium 138 mmol/L      Potassium 3.7 mmol/L      Chloride 106 mmol/L      CO2 24 mmol/L      ANION GAP 8 mmol/L      BUN 22 mg/dL      Creatinine 0.83 mg/dL      Glucose 103 mg/dL      Calcium 9.5 mg/dL      AST 20 U/L      ALT 23 U/L      Alkaline Phosphatase 61 U/L      Total Protein 6.8 g/dL      " Albumin 4.1 g/dL      Total Bilirubin 0.60 mg/dL      eGFR 80 ml/min/1.73sq m     Narrative:      National Kid[employer] Disease Foundation guidelines for Chronic Kidney Disease (CKD):     Stage 1 with normal or high GFR (GFR > 90 mL/min/1.73 square meters)    Stage 2 Mild CKD (GFR = 60-89 mL/min/1.73 square meters)    Stage 3A Moderate CKD (GFR = 45-59 mL/min/1.73 square meters)    Stage 3B Moderate CKD (GFR = 30-44 mL/min/1.73 square meters)    Stage 4 Severe CKD (GFR = 15-29 mL/min/1.73 square meters)    Stage 5 End Stage CKD (GFR <15 mL/min/1.73 square meters)  Note: GFR calculation is accurate only with a steady state creatinine    D-dimer, quantitative [[id number]]  (Normal) Collected: 06/29/24 2259    Lab Status: Final result Specimen: Blood from Arm, Right Updated: 06/29/24 2325     D-Dimer, Quant 0.42 ug/ml FEU     Narrative:      In the evaluation for possible pulmonary embolism, in the appropriate (Well's Score of 4 or less) patient, the age adjusted d-dimer cutoff for this patient can be calculated as:    Age x 0.01 (in ug/mL) for Age-adjusted D-dimer exclusion threshold for a patient over 50 years.    CBC and differential [[id number]] Collected: 06/29/24 2259    Lab Status: Final result Specimen: Blood from Arm, Right Updated: 06/29/24 2309     WBC 9.28 Thousand/uL      RBC 4.75 Million/uL      Hemoglobin 13.5 g/dL      Hematocrit 41.3 %      MCV 87 fL      MCH 28.4 pg      MCHC 32.7 g/dL      RDW 13.8 %      MPV 10.3 fL      Platelets 206 Thousands/uL      nRBC 0 /100 WBCs      Segmented % 48 %      Immature Grans % 0 %      Lymphocytes % 40 %      Monocytes % 8 %      Eosinophils Relative 3 %      Basophils Relative 1 %      Absolute Neutrophils 4.58 Thousands/µL      Absolute Immature Grans 0.03 Thousand/uL      Absolute Lymphocytes 3.67 Thousands/µL      Absolute Monocytes 0.72 Thousand/µL      Eosinophils Absolute 0.23 Thousand/µL      Basophils Absolute 0.05 Thousands/µL                    CTA dissection  protocol chest/abdomen/pelvis   Final Result by Omari Crowe MD (06/30 0059)      1.  No acute aortic syndrome   2.  No acute abdominopelvic abnormality   3.  Hepatomegaly and steatosis   4.  Hiatal hernia               Workstation performed: [id number]         XR chest 1 view portable   ED Interpretation by Colin Lubin MD (06/30 0018)   Mild/equivocal cardiomegaly. No other acute process. Interpreted by me.            Procedures  ECG 12 Lead Documentation Only    Date/Time: 6/29/2024 10:45 PM    Performed by: Colin Lubin MD  Authorized by: Colin Lubin MD    Indications / Diagnosis:  Chest Pain  ECG reviewed by me, the ED Provider: yes    Patient location:  ED  Previous ECG:     Comparison to cardiac monitor: Yes    Interpretation:     Interpretation: normal    Rate:     ECG rate:  89    ECG rate assessment: normal    Rhythm:     Rhythm: sinus rhythm    Ectopy:     Ectopy: none    QRS:     QRS axis:  Normal    QRS intervals:  Normal  Conduction:     Conduction: normal    ST segments:     ST segments:  Normal  T waves:     T waves: normal          ED Course  ED Course as of 06/30/24 0637   Sat Jun 29, 2024   2350 TSH 3RD GENERATON(!): 8.156             HEART Risk Score      Flowsheet Row Most Recent Value   Heart Score Risk Calculator    History 0 Filed at: 06/30/2024 0016   ECG 0 Filed at: 06/30/2024 0016   Age 1 Filed at: 06/30/2024 0016   Risk Factors 1 Filed at: 06/30/2024 0016   Troponin 0 Filed at: 06/30/2024 0016   HEART Score 2 Filed at: 06/30/2024 0016                            Wells' Criteria for PE      Flowsheet Row Most Recent Value   Wells' Criteria for PE    Clinical signs and symptoms of DVT 0 Filed at: 06/30/2024 0636   PE is primary diagnosis or equally likely 0 Filed at: 06/30/2024 0636   HR >100 0 Filed at: 06/30/2024 0636   Immobilization at least 3 days or Surgery in the previous 4 weeks 0 Filed at: 06/30/2024 0636   Previous, objectively diagnosed PE or DVT 0 Filed at: 06/30/2024  0636   Hemoptysis 0 Filed at: 06/30/2024 0636   Malignancy with treatment within 6 months or palliative --   Wells' Criteria Total 0 Filed at: 06/30/2024 0636              Medical Decision Making  Patient is a 55-year-old female presents to the ED for chest pain.  Patient stated that at approximately 9:30 PM tonight she started having acute onset of chest pain over her right breast while at rest on the couch watching TV.  Patient stated that the pain lasted approximately 30 seconds and resolved on its own.  Pain radiated to the back.  States that she has never had chest pain with this before.  8/10 intensity.  During this time she also felt diaphoretic and flushed.  States that her Apple Watch recorded her as having a .  Approximately 30 minutes later she had a subsequent episode of diaphoresis and palpitations although no chest pain at this time.  She also endorses that earlier in the day she was cleaning windows when she felt like she was getting very diaphoretic out of proportion to the level of physical activity that she was doing.  States that she has asthma but she has had some shortness of breath today that does not feel like usual asthma shortness of breath but is mild.    Ddx includes but is not limited to ACS, PE, dissection, electrolyte abnormality, anemia, chest wall pain.  CBC and CMP grossly unremarkable.  EKG nonischemic and CXR without acute process. Troponin initial and 2 hour delta unremarkable. BNP within normal limits.  CT dissection study was negative for any acute process.  Patient was counseled extensively and given referral to cardiology.  Patient was discharged with explicit return precautions.    Amount and/or Complexity of Data Reviewed  Labs: ordered. Decision-making details documented in ED Course.  Radiology: ordered and independent interpretation performed.    Risk  Prescription drug management.          Disposition  Final diagnoses:   Chest pain   Elevated TSH     Time reflects  when diagnosis was documented in both MDM as applicable and the Disposition within this note       Time User Action Codes Description Comment    6/30/2024  2:04 AM Colin Lubin [R07.89] Atypical chest pain     6/30/2024  2:04 AM Colin Lubin [R07.89] Atypical chest pain     6/30/2024  2:04 AM Colin Lubin [R07.9] Chest pain     6/30/2024  2:05 AM Colin Lubin [R79.89] Elevated TSH           ED Disposition       ED Disposition   Discharge    Condition   Stable    Date/Time   Sun Jun 30, 2024 0204    Comment   Екатерина Camacho discharge to home/self care.                   Follow-up Information       Follow up With Specialties Details Why Contact Info Additional Information    [facility]   [address] [facility] [address]  [phone] [place] [address] [address], [place], [address]   [phone]    [facility], Internal Medicine, Nurse Practitioner   [address] [place] [address]  [phone]               Discharge Medication List as of 6/30/2024  2:06 AM        CONTINUE these medications which have NOT CHANGED    Details   acyclovir (ZOVIRAX) 5 % ointment Apply topically every 3 (three) hours, Starting Fri 12/23/2022, Normal      albuterol (2.5 mg/3 mL) 0.083 % nebulizer solution Take 3 mL (2.5 mg total) by nebulization every 4 (four) hours as needed for wheezing or shortness of breath, Starting Fri 5/20/2022, Normal      budesonide-formoterol (SYMBICORT) 80-4.5 MCG/ACT inhaler Inhale 2 puffs 2 (two) times a day, Starting Mon 2/13/2023, Normal      furosemide (LASIX) 20 mg tablet TAKE 1 TABLET BY MOUTH DAILY AS NEEDED (EDEMA)., Normal      guselkumab (Tremfya) subcutaneous injection Historical Med      Klor-Con M20 20 MEQ tablet TAKE 1 TABLET BY MOUTH EVERY DAY, Normal      levothyroxine 150 mcg tablet TAKE 1 TABLET BY MOUTH EVERY DAY,  Starting Tue 2/20/2024, Normal      meloxicam (Mobic) 15 mg tablet Take 1 tablet (15 mg total) by mouth daily, Starting Wed 6/19/2024, Normal      methylPREDNISolone 4 MG tablet therapy pack Use as directed on package, Normal      montelukast (SINGULAIR) 10 mg tablet TAKE 1 TABLET BY MOUTH DAILY AT BEDTIME, Normal      Proventil  (90 Base) MCG/ACT inhaler Inhale 2 puffs every 4 (four) hours as needed for wheezing, Starting Sat 1/28/2023, Normal      triamcinolone (KENALOG) 0.1 % cream Apply topically 2 (two) times a day, Starting Thu 3/9/2023, Normal      valACYclovir (VALTREX) 500 mg tablet TAKE 1 TABLET (500 MG TOTAL) BY MOUTH DAILY., Starting Mon 3/20/2023, Until Thu 3/14/2024, Normal               PDMP Review         Value Time User    PDMP Reviewed  Yes 6/29/2024 10:39 PM Mikhail Lu MD             ED Provider  Attending physically available and evaluated Екатерина Camacho. I managed the patient along with the ED Attending.    Electronically Signed by           Colin Lubin MD  06/30/24 0618       Colin Lubin MD  06/30/24 0637

## 2024-07-01 ENCOUNTER — TELEPHONE (OUTPATIENT)
Dept: CARDIOLOGY CLINIC | Facility: CLINIC | Age: 54
End: 2024-07-01

## 2024-07-01 LAB
ATRIAL RATE: 89 BPM
P AXIS: 56 DEGREES
PR INTERVAL: 168 MS
QRS AXIS: 87 DEGREES
QRSD INTERVAL: 98 MS
QT INTERVAL: 362 MS
QTC INTERVAL: 440 MS
T WAVE AXIS: 44 DEGREES
VENTRICULAR RATE: 89 BPM

## 2024-07-01 PROCEDURE — 93010 ELECTROCARDIOGRAM REPORT: CPT | Performed by: INTERNAL MEDICINE

## 2024-07-01 NOTE — TELEPHONE ENCOUNTER
Spoke w/ pt and she stated she will confer with her primary and if they want her to follow up w/ cardiology she will call back to set up hospital follow up

## 2024-07-09 ENCOUNTER — OFFICE VISIT (OUTPATIENT)
Dept: GASTROENTEROLOGY | Facility: CLINIC | Age: 54
End: 2024-07-09
Payer: COMMERCIAL

## 2024-07-09 VITALS
DIASTOLIC BLOOD PRESSURE: 80 MMHG | HEART RATE: 95 BPM | TEMPERATURE: 97 F | BODY MASS INDEX: 51.91 KG/M2 | OXYGEN SATURATION: 97 % | WEIGHT: 293 LBS | HEIGHT: 63 IN | SYSTOLIC BLOOD PRESSURE: 122 MMHG

## 2024-07-09 DIAGNOSIS — K76.0 FATTY LIVER: Primary | ICD-10-CM

## 2024-07-09 DIAGNOSIS — R16.0 ENLARGED LIVER: ICD-10-CM

## 2024-07-09 DIAGNOSIS — E66.01 CLASS 3 SEVERE OBESITY DUE TO EXCESS CALORIES WITH SERIOUS COMORBIDITY AND BODY MASS INDEX (BMI) OF 50.0 TO 59.9 IN ADULT (HCC): ICD-10-CM

## 2024-07-09 PROCEDURE — 99204 OFFICE O/P NEW MOD 45 MIN: CPT | Performed by: STUDENT IN AN ORGANIZED HEALTH CARE EDUCATION/TRAINING PROGRAM

## 2024-07-09 NOTE — PROGRESS NOTES
Consultation -  [facility]  Екатерина JORDI Camacho 54 y.o. female MRN: [id number]  Unit/Bed#:  Encounter: [id number]      ASSESSMENT AND PLAN:    55 yo F with history of asthma, hypothyroidism, psoriasis on guselkumab, GERD and MASLD who presents for new hepatology consult. She endorses RUQ pain leading to RUQ US on 4/3 showing enlarged liver measuring 21.3 cm with nodular surface contour with diffuse increased echogenicity without mass, small amount of sludge in gallbladder. Previous imaging showing hepatic steatosis without nodularity overall concerning for MASLD.    MASLD  Hepatomegaly   Class III obesity    Fibrosis-4 (FIB-4) Score is: 1.09    Indication for testing Absence of advanced fibrosis Presence of advanced fibrosis Indeterminate result   NAFLD <1.30 >2.67 1.30-2.67   Hepatitis C <1.45 >3.25 1.45-3.25   Hepatitis B <1.00 >2.65 1.00-2.65     FIB-4 Score Component Values:  Component Value Date   Age: 54 y.o.     AST: 20 U/L 6/29/2024   Platelet: 206 Thousands/uL 6/29/2024   ALT: 23 U/L 6/29/2024     NAFLD Fibrosis Score: 0.86 at 6/29/2024 10:59 PM  Calculated from:  SGOT/AST: 20 U/L at 6/29/2024 10:59 PM  SGPT/ALT: 23 U/L at 6/29/2024 10:59 PM  Serum Albumin: 4.1 g/dL at 6/29/2024 10:59 PM  Platelets: 206 Thousands/uL at 6/29/2024 10:59 PM  Age: 54 years  BMI: 53.8 at 6/29/2024 10:45 PM  Weight (recorded): 137.80 kg at 6/29/2024 10:45 PM  Height: 160.00 cm at 6/29/2024 10:45 PM  Has Diabetes: No    - check chronic liver serologies  - check elastography/UGAP  - discussed course of MASLD and progression to MASH and cirrhosis  - discussed there are medications that can help prevent progression for some pt's with fibrosis, but ultimately diet and exercise is most effective  - goal 5-10% weight loss  - discussed referral to weight mngmt, but she would like to defer at this time   ______________________________________________________________________    HPI:  Екатерина Camacho is our 55 yo F with history of  asthma, hypothyroidism, psoriasis on guselkumab, GERD and MASLD who presents for new hepatology consult. She endorses RUQ pain leading to RUQ US on 4/3 showing enlarged liver measuring 21.3 cm with nodular surface contour with diffuse increased echogenicity without mass, small amount of sludge in gallbladder. She has had evidence of fatty liver disease on imaging dating back to as early as . She recently presented to the ED on  due to chest pain and was discharged home. Her mom has LYNCH and  at 66, but pt unsure if this was due to liver disease. She has alcohol 1-2 drinks a week. No tobacco. She teaches PE for middle school. She lost 30 lbs two years ago, but gained a lot of this back. She walks for exercise. She eats late at night. She drinks coffee. Denies rash, bruising, fluid retention. Previous on Tremfya, which can cause mild transient elevations in liver enzymes, but no chronic liver injury nor HBV reactivation. She eats chicken, beef, cold cuts, wraps, few carbs. [place].      EGD from 21 showing moderate gastritis and mild duodenitis with normal duodenal bx with chronic gastritis   Colonoscopy sowing a single sub-cm colon polyp, mild diverticulosis, small internal hemorrhoids with plan for repeat in 5 yrs  EGD from 2018 showing duodenal bx with increased intraepithelial lymphocytes with normal villous architecture with mild chronic active antral gastritis with H pylori negative    Answers submitted by the patient for this visit:  Abdominal Pain Questionnaire (Submitted on 2024)  Chief Complaint: Abdominal pain  Chronicity: recurrent  Onset: more than 1 month ago  Onset quality: undetermined  Frequency: rarely  Episode duration: 24 Hours  Progression since onset: rapidly improving  Pain location: RUQ  Pain - numeric: 1/10  Pain quality: cramping  Radiates to: does not radiate  anorexia: No  arthralgias: Yes  belching: No  constipation: No  diarrhea: No  dysuria:  "No  fever: No  flatus: No  frequency: No  headaches: No  hematochezia: No  hematuria: No  melena: No  myalgias: Yes  nausea: No  weight loss: No  vomiting: No  Aggravated by: nothing  Relieved by: certain positions  Diagnostic workup: CT scan, ultrasound        REVIEW OF SYSTEMS:    CONSTITUTIONAL: Denies any fever, chills, rigors, and weight loss.  HEENT: No earache or tinnitus. Denies hearing loss or visual disturbances.  CARDIOVASCULAR: No chest pain or palpitations.   RESPIRATORY: Denies any cough, hemoptysis, shortness of breath or dyspnea on exertion.  GASTROINTESTINAL: As noted in the History of Present Illness.   GENITOURINARY: No problems with urination. Denies any hematuria or dysuria.  NEUROLOGIC: No dizziness or vertigo, denies headaches.   MUSCULOSKELETAL: Denies any muscle or joint pain.   SKIN: Denies skin rashes or itching.   ENDOCRINE: Denies excessive thirst. Denies intolerance to heat or cold.  PSYCHOSOCIAL: Denies depression or anxiety. Denies any recent memory loss.       Historical Information   Past Medical History:   Diagnosis Date    Acute pain of right shoulder     [id number] RESOLVED//seen by pain management    Allergic     Anesthesia     \"sometimes wakes up before procedure done ie, with MRI and EGD\"    Anxiety     [id number]  LAST ASSESSED    Arthritis     left knee    Asthma     allergy induced    Bulging of intervertebral disc between L4 and L5     Bursitis of right knee     better now    Depression     Ear infection     left PCP placed pt on antibiotic and ear drops    Elevated AST (SGOT)     [id number] RESOLVED    GERD (gastroesophageal reflux disease)     Goiter     [id number] RESOLVED//pt denies states has \"inflamed thyroid\"    Hiatal hernia     Hypothyroidism     [id number] RESOLVED//not on meds    Lipoma     left scapular area    Lump of axilla, right     [id number] RESOLVED marker clip in rt breast    Obstructive sleep apnea     [id number] RESOLVED    Psoriasis     Rash     under bilat " "breasts and folds of abdomen,, pt reports Dr Harkins aware and dermatologist is treating/pt using ketoconazole cream as directed    Shortness of breath     \"with exertion\"    Use of cane as ambulatory aid     occas     Past Surgical History:   Procedure Laterality Date    CARPAL TUNNEL RELEASE Bilateral     COLONOSCOPY      EGD      HYSTERECTOMY  12/30/2010    WITH ENDOMETERIAL ABLATION    KNEE SURGERY Left     NERVE BLOCK      PERIPH NERVE BLOCK WRIST MEDIAN W/LOCAL STEROID INFILTRAIT    PLANTAR FASCIA RELEASE Bilateral 2009, 2020 R    June 2020 on R    DC COLONOSCOPY FLX DX W/COLLJ SPEC WHEN PFRMD N/A 08/02/2018    Procedure: EGD AND COLONOSCOPY;  Surgeon: Mulugeta Gill MD;  Location: [facility];  Service: Gastroenterology    DC EXC B9 LESION MRGN XCP SK TG T/A/L 0.5 CM/< N/A 3/2/2023    Procedure: EXCISION WIDE LESION LEFT UPPER BACK;  Surgeon: Maya Chavez MD;  Location: [facility];  Service: General    DC FASCIECTOMY PLANTAR FASCIA PARTIAL SPX Right 06/09/2020    Procedure: RELEASE FASCIA PLANTAR/FASCIOTOMY ENDOSCOPIC (EPF);  Surgeon: Viraj Harkins DPM;  Location: [facility];  Service: Podiatry    DC RELEASE TARSAL TUNNEL Right 08/11/2020    Procedure: RELEASE TARSAL TUNNEL;  Surgeon: Viraj Harkins DPM;  Location: [facility];  Service: Podiatry    TONSILLECTOMY AND ADENOIDECTOMY       Social History   Social History     Substance and Sexual Activity   Alcohol Use Yes    Comment: very rare (1 drink once per month - if that)     Social History     Substance and Sexual Activity   Drug Use No     Social History     Tobacco Use   Smoking Status Never    Passive exposure: Never   Smokeless Tobacco Never     Family History   Problem Relation Age of Onset    Cirrhosis Mother     Hypertension Father     Nephrolithiasis Sister     Fibroids Sister     Anxiety disorder Daughter     Asthma Daughter     Heart disease Maternal Grandfather     Ovarian cancer Paternal Grandmother     Obesity Family     Uterine cancer " Maternal Aunt     Fibroids Sister     Fibroids Sister     Ulcerative colitis Son        Meds/Allergies     Not in a hospital admission.  No current facility-administered medications for this visit.       Allergies   Allergen Reactions    Chlorhexidine Rash           Objective     not currently breastfeeding. There is no height or weight on file to calculate BMI.    [unfilled]      PHYSICAL EXAM:      General Appearance:   Alert, cooperative, no distress, obese   HEENT:   Normocephalic, atraumatic, anicteric.     Neck:  Supple, symmetrical, trachea midline   Lungs:   No respiratory distress   Heart::   Regular rate and rhythm per monitor   Abdomen:   Soft, non-tender, non-distended; normal bowel sounds; no masses, no organomegaly    Genitalia:   Deferred    Rectal:   Deferred    Extremities:  No cyanosis, clubbing or edema    Pulses:  Pulses present   Skin:  No jaundice, rashes, or lesions    Lymph nodes:  No palpable cervical lymphadenopathy        Lab Results:   No visits with results within 1 Day(s) from this visit.   Latest known visit with results is:   Admission on 06/29/2024, Discharged on 06/30/2024   Component Date Value    Ventricular Rate 06/29/2024 89     Atrial Rate 06/29/2024 89     LA Interval 06/29/2024 168     QRSD Interval 06/29/2024 98     QT Interval 06/29/2024 362     QTC Interval 06/29/2024 440     P Axis 06/29/2024 56     QRS Axis 06/29/2024 87     T Wave [place] 06/29/2024 44     WBC 06/29/2024 9.28     RBC 06/29/2024 4.75     Hemoglobin 06/29/2024 13.5     Hematocrit 06/29/2024 41.3     MCV 06/29/2024 87     MCH 06/29/2024 28.4     MCHC 06/29/2024 32.7     RDW 06/29/2024 13.8     MPV 06/29/2024 10.3     Platelets 06/29/2024 206     nRBC 06/29/2024 0     Segmented % 06/29/2024 48     Immature Grans % 06/29/2024 0     Lymphocytes % 06/29/2024 40     Monocytes % 06/29/2024 8     Eosinophils Relative 06/29/2024 3     Basophils Relative 06/29/2024 1     Absolute Neutrophils 06/29/2024 4.58      Absolute Immature Grans 06/29/2024 0.03     Absolute Lymphocytes 06/29/2024 3.67     Absolute Monocytes 06/29/2024 0.72     Eosinophils Absolute 06/29/2024 0.23     Basophils Absolute 06/29/2024 0.05     Sodium 06/29/2024 138     Potassium 06/29/2024 3.7     Chloride 06/29/2024 106     CO2 06/29/2024 24     ANION GAP 06/29/2024 8     BUN 06/29/2024 22     Creatinine 06/29/2024 0.83     Glucose 06/29/2024 103     Calcium 06/29/2024 9.5     AST 06/29/2024 20     ALT 06/29/2024 23     Alkaline Phosphatase 06/29/2024 61     Total Protein 06/29/2024 6.8     Albumin 06/29/2024 4.1     Total Bilirubin 06/29/2024 0.60     eGFR 06/29/2024 80     hs TnI 0hr 06/29/2024 3     TSH 3RD GENERATON 06/29/2024 8.156 (H)     D-Dimer, Quant 06/29/2024 0.42     BNP 06/29/2024 10     hs TnI 2hr 06/30/2024 3     Delta 2hr hsTnI 06/30/2024 0     Free T4 06/29/2024 0.74        Imaging Studies: I have personally reviewed pertinent imaging studies.

## 2024-07-14 ENCOUNTER — APPOINTMENT (OUTPATIENT)
Dept: LAB | Facility: CLINIC | Age: 54
End: 2024-07-14
Payer: COMMERCIAL

## 2024-07-14 ENCOUNTER — HOSPITAL ENCOUNTER (OUTPATIENT)
Dept: ULTRASOUND IMAGING | Facility: HOSPITAL | Age: 54
Discharge: HOME | End: 2024-07-14
Payer: COMMERCIAL

## 2024-07-14 DIAGNOSIS — K76.0 FATTY LIVER: ICD-10-CM

## 2024-07-14 DIAGNOSIS — R16.0 ENLARGED LIVER: ICD-10-CM

## 2024-07-14 LAB
ALBUMIN SERPL BCG-MCNC: 4.1 G/DL (ref 3.5–5)
ALP SERPL-CCNC: 64 U/L (ref 34–104)
ALT SERPL W P-5'-P-CCNC: 27 U/L (ref 7–52)
ANA SER QL IA: NEGATIVE
ANION GAP SERPL CALCULATED.3IONS-SCNC: 5 MMOL/L (ref 4–13)
AST SERPL W P-5'-P-CCNC: 19 U/L (ref 13–39)
BASOPHILS # BLD AUTO: 0.04 THOUSANDS/ÂΜL (ref 0–0.1)
BASOPHILS NFR BLD AUTO: 1 % (ref 0–1)
BILIRUB SERPL-MCNC: 0.55 MG/DL (ref 0.2–1)
BUN SERPL-MCNC: 18 MG/DL (ref 5–25)
CALCIUM SERPL-MCNC: 9.3 MG/DL (ref 8.4–10.2)
CHLORIDE SERPL-SCNC: 108 MMOL/L (ref 96–108)
CO2 SERPL-SCNC: 26 MMOL/L (ref 21–32)
CREAT SERPL-MCNC: 0.72 MG/DL (ref 0.6–1.3)
EOSINOPHIL # BLD AUTO: 0.2 THOUSAND/ÂΜL (ref 0–0.61)
EOSINOPHIL NFR BLD AUTO: 4 % (ref 0–6)
ERYTHROCYTE [DISTWIDTH] IN BLOOD BY AUTOMATED COUNT: 13.4 % (ref 11.6–15.1)
FERRITIN SERPL-MCNC: 111 NG/ML (ref 11–307)
GFR SERPL CREATININE-BSD FRML MDRD: 95 ML/MIN/1.73SQ M
GLUCOSE P FAST SERPL-MCNC: 105 MG/DL (ref 65–99)
HAV AB SER QL IA: NORMAL
HAV IGM SER QL: NORMAL
HBV CORE IGM SER QL: NORMAL
HBV SURFACE AB SER-ACNC: 5.81 MIU/ML
HBV SURFACE AG SER QL: NORMAL
HCT VFR BLD AUTO: 44.1 % (ref 34.8–46.1)
HCV AB SER QL: NORMAL
HGB BLD-MCNC: 14.5 G/DL (ref 11.5–15.4)
IGA SERPL-MCNC: 194 MG/DL (ref 66–433)
IGG SERPL-MCNC: 1050 MG/DL (ref 635–1741)
IGM SERPL-MCNC: 114 MG/DL (ref 45–281)
IMM GRANULOCYTES # BLD AUTO: 0.02 THOUSAND/UL (ref 0–0.2)
IMM GRANULOCYTES NFR BLD AUTO: 0 % (ref 0–2)
INR PPP: 0.93 (ref 0.84–1.19)
IRON SATN MFR SERPL: 23 % (ref 15–50)
IRON SERPL-MCNC: 74 UG/DL (ref 50–212)
LYMPHOCYTES # BLD AUTO: 2.46 THOUSANDS/ÂΜL (ref 0.6–4.47)
LYMPHOCYTES NFR BLD AUTO: 43 % (ref 14–44)
MCH RBC QN AUTO: 28.5 PG (ref 26.8–34.3)
MCHC RBC AUTO-ENTMCNC: 32.9 G/DL (ref 31.4–37.4)
MCV RBC AUTO: 87 FL (ref 82–98)
MONOCYTES # BLD AUTO: 0.53 THOUSAND/ÂΜL (ref 0.17–1.22)
MONOCYTES NFR BLD AUTO: 10 % (ref 4–12)
NEUTROPHILS # BLD AUTO: 2.34 THOUSANDS/ÂΜL (ref 1.85–7.62)
NEUTS SEG NFR BLD AUTO: 42 % (ref 43–75)
NRBC BLD AUTO-RTO: 0 /100 WBCS
PLATELET # BLD AUTO: 202 THOUSANDS/UL (ref 149–390)
PMV BLD AUTO: 10.5 FL (ref 8.9–12.7)
POTASSIUM SERPL-SCNC: 4.1 MMOL/L (ref 3.5–5.3)
PROT SERPL-MCNC: 7.1 G/DL (ref 6.4–8.4)
PROTHROMBIN TIME: 13 SECONDS (ref 11.6–14.5)
RBC # BLD AUTO: 5.09 MILLION/UL (ref 3.81–5.12)
SODIUM SERPL-SCNC: 139 MMOL/L (ref 135–147)
TIBC SERPL-MCNC: 322 UG/DL (ref 250–450)
UIBC SERPL-MCNC: 248 UG/DL (ref 155–355)
WBC # BLD AUTO: 5.59 THOUSAND/UL (ref 4.31–10.16)

## 2024-07-14 PROCEDURE — 80074 ACUTE HEPATITIS PANEL: CPT

## 2024-07-14 PROCEDURE — 85610 PROTHROMBIN TIME: CPT

## 2024-07-14 PROCEDURE — 83550 IRON BINDING TEST: CPT

## 2024-07-14 PROCEDURE — 86381 MITOCHONDRIAL ANTIBODY EACH: CPT

## 2024-07-14 PROCEDURE — 36415 COLL VENOUS BLD VENIPUNCTURE: CPT

## 2024-07-14 PROCEDURE — 86708 HEPATITIS A ANTIBODY: CPT

## 2024-07-14 PROCEDURE — 86038 ANTINUCLEAR ANTIBODIES: CPT

## 2024-07-14 PROCEDURE — 83540 ASSAY OF IRON: CPT

## 2024-07-14 PROCEDURE — 86015 ACTIN ANTIBODY EACH: CPT

## 2024-07-14 PROCEDURE — 82103 ALPHA-1-ANTITRYPSIN TOTAL: CPT

## 2024-07-14 PROCEDURE — 86706 HEP B SURFACE ANTIBODY: CPT

## 2024-07-14 PROCEDURE — 82104 ALPHA-1-ANTITRYPSIN PHENO: CPT | Performed by: STUDENT IN AN ORGANIZED HEALTH CARE EDUCATION/TRAINING PROGRAM

## 2024-07-14 PROCEDURE — 82784 ASSAY IGA/IGD/IGG/IGM EACH: CPT

## 2024-07-14 PROCEDURE — 82728 ASSAY OF FERRITIN: CPT

## 2024-07-14 PROCEDURE — 76981 USE PARENCHYMA: CPT

## 2024-07-15 ENCOUNTER — RA CDI HCC (OUTPATIENT)
Dept: OTHER | Facility: HOSPITAL | Age: 54
End: 2024-07-15

## 2024-07-15 LAB
A1AT SERPL-MCNC: 137 MG/DL (ref 101–187)
ACTIN IGG SERPL-ACNC: 5 UNITS (ref 0–19)
MITOCHONDRIA M2 IGG SER-ACNC: <20 UNITS (ref 0–20)

## 2024-07-15 NOTE — PROGRESS NOTES
HCC coding opportunities       Chart reviewed, no opportunity found: CHART REVIEWED, NO OPPORTUNITY FOUND        Patients Insurance        Commercial Insurance: [employer]

## 2024-07-17 LAB
A1AT PHENOTYP SERPL IFE: NORMAL
A1AT SERPL-MCNC: 139 MG/DL (ref 101–187)

## 2024-07-22 ENCOUNTER — OFFICE VISIT (OUTPATIENT)
Dept: FAMILY MEDICINE CLINIC | Facility: CLINIC | Age: 54
End: 2024-07-22
Payer: COMMERCIAL

## 2024-07-22 VITALS
HEIGHT: 63 IN | TEMPERATURE: 97 F | BODY MASS INDEX: 51.91 KG/M2 | OXYGEN SATURATION: 97 % | DIASTOLIC BLOOD PRESSURE: 70 MMHG | WEIGHT: 293 LBS | SYSTOLIC BLOOD PRESSURE: 100 MMHG | RESPIRATION RATE: 16 BRPM | HEART RATE: 72 BPM

## 2024-07-22 DIAGNOSIS — E03.8 HYPOTHYROIDISM DUE TO HASHIMOTO'S THYROIDITIS: ICD-10-CM

## 2024-07-22 DIAGNOSIS — E66.01 CLASS 3 SEVERE OBESITY DUE TO EXCESS CALORIES WITH SERIOUS COMORBIDITY AND BODY MASS INDEX (BMI) OF 50.0 TO 59.9 IN ADULT (HCC): ICD-10-CM

## 2024-07-22 DIAGNOSIS — M25.512 ACUTE PAIN OF LEFT SHOULDER: ICD-10-CM

## 2024-07-22 DIAGNOSIS — B00.9 HERPES SIMPLEX TYPE 1 INFECTION: ICD-10-CM

## 2024-07-22 DIAGNOSIS — K76.0 FATTY LIVER: Primary | ICD-10-CM

## 2024-07-22 DIAGNOSIS — S29.011A STRAIN OF LEFT PECTORALIS MUSCLE, INITIAL ENCOUNTER: ICD-10-CM

## 2024-07-22 DIAGNOSIS — E06.3 HYPOTHYROIDISM DUE TO HASHIMOTO'S THYROIDITIS: ICD-10-CM

## 2024-07-22 PROCEDURE — 99214 OFFICE O/P EST MOD 30 MIN: CPT | Performed by: NURSE PRACTITIONER

## 2024-07-22 RX ORDER — LEVOTHYROXINE SODIUM 175 UG/1
175 TABLET ORAL DAILY
Qty: 90 TABLET | Refills: 1 | Status: SHIPPED | OUTPATIENT
Start: 2024-07-22

## 2024-07-22 RX ORDER — MELOXICAM 15 MG/1
15 TABLET ORAL DAILY
Qty: 30 TABLET | Refills: 1 | Status: SHIPPED | OUTPATIENT
Start: 2024-07-22

## 2024-07-22 RX ORDER — VALACYCLOVIR HYDROCHLORIDE 500 MG/1
500 TABLET, FILM COATED ORAL DAILY
Qty: 90 TABLET | Refills: 1 | Status: SHIPPED | OUTPATIENT
Start: 2024-07-22 | End: 2025-07-17

## 2024-07-22 NOTE — PROGRESS NOTES
FAMILY PRACTICE OFFICE VISIT       NAME: Екатерина Camacho  AGE: 54 y.o. SEX: female       : 1970        MRN: [id number]    DATE: 2024  TIME: 10:28 PM    Assessment and Plan   1. Fatty liver  Assessment & Plan:  Cont f/u with gastro  Cont low fat diet  Exercise    2. Herpes simplex type 1 infection  -     valACYclovir (VALTREX) 500 mg tablet; Take 1 tablet (500 mg total) by mouth daily  3. Strain of left pectoralis muscle, initial encounter  -     meloxicam (Mobic) 15 mg tablet; Take 1 tablet (15 mg total) by mouth daily  4. Acute pain of left shoulder  -     meloxicam (Mobic) 15 mg tablet; Take 1 tablet (15 mg total) by mouth daily  5. Class 3 severe obesity due to excess calories with serious comorbidity and body mass index (BMI) of 50.0 to 59.9 in adult (HCC)  Assessment & Plan:  To consider slg1 in the future  Diet and exercise    6. Hypothyroidism due to Hashimoto's thyroiditis  Assessment & Plan:  Meds as ordered and repeat tsh    Orders:  -     levothyroxine (Synthroid) 175 mcg tablet; Take 1 tablet (175 mcg total) by mouth daily  -     TSH, 3rd generation with Free T4 reflex; Future       There are no Patient Instructions on file for this visit.        Chief Complaint     Chief Complaint   Patient presents with    Follow-up     Pt is being seen for a follow up       History of Present Illness   Екатерина Camacho is a 54 y.o.-year-old female who is here for ED f/u  Had gone to ED for chest pain with palpitations and diaphoresis  Work up wnl for non cardiac  Labs done and tsh was off  Saw liver provider and has stage 3 liver disease, in repairable state  Patient admits to being a boredom eater  Havig left shoulder pain on and off      Review of Systems   Review of Systems   Constitutional:  Negative for fatigue and fever.   HENT:  Negative for congestion, postnasal drip and rhinorrhea.    Eyes:  Negative for photophobia and visual disturbance.   Respiratory:  Negative for cough, shortness of breath and  "wheezing.    Cardiovascular:  Negative for chest pain and palpitations.   Gastrointestinal:  Negative for constipation, diarrhea, nausea and vomiting.   Genitourinary:  Negative for dysuria and frequency.   Musculoskeletal:  Positive for arthralgias and myalgias.   Skin:  Negative for rash.   Neurological:  Negative for dizziness, light-headedness and headaches.   Hematological:  Negative for adenopathy.   Psychiatric/Behavioral:  Negative for dysphoric mood and sleep disturbance. The patient is not nervous/anxious.        Active Problem List     Patient Active Problem List   Diagnosis    Herpes simplex type 1 infection    Chronic bilateral low back pain without sciatica    Mild intermittent asthma    Gastroesophageal reflux disease without esophagitis    Anxiety    Class 3 severe obesity due to excess calories with serious comorbidity and body mass index (BMI) of 50.0 to 59.9 in adult (HCC)    Family history of colon cancer    Hypothyroidism due to Hashimoto's thyroiditis    Psoriasis    Ganglion of tendon sheath    Costovertebral angle pain    Herniated thoracic disc without myelopathy    Cold sore    History of COVID-19    Irritant contact dermatitis due to other agents    Chronic left-sided thoracic back pain    History of hysterectomy    Enlarged liver    Fatty liver    Strain of left pectoralis muscle    Acute pain of left shoulder         Past Medical History:  Past Medical History:   Diagnosis Date    Acute pain of right shoulder     [id number] RESOLVED//seen by pain management    Allergic     Anesthesia     \"sometimes wakes up before procedure done ie, with MRI and EGD\"    Anxiety     [id number]  LAST ASSESSED    Arthritis     left knee    Asthma     allergy induced    Bulging of intervertebral disc between L4 and L5     Bursitis of right knee     better now    Depression     Ear infection     left PCP placed pt on antibiotic and ear drops    Elevated AST (SGOT)     [id number] RESOLVED    GERD " "(gastroesophageal reflux disease)     Goiter     [id number] RESOLVED//pt denies states has \"inflamed thyroid\"    Hiatal hernia     Hypothyroidism     [id number] RESOLVED//not on meds    Lipoma     left scapular area    Lump of axilla, right     [id number] RESOLVED marker clip in rt breast    Obstructive sleep apnea     [id number] RESOLVED    Psoriasis     Rash     under bilat breasts and folds of abdomen,, pt reports Dr Harkins aware and dermatologist is treating/pt using ketoconazole cream as directed    Shortness of breath     \"with exertion\"    Use of cane as ambulatory aid     occas       Past Surgical History:  Past Surgical History:   Procedure Laterality Date    CARPAL TUNNEL RELEASE Bilateral     COLONOSCOPY      EGD      HYSTERECTOMY  12/30/2010    WITH ENDOMETERIAL ABLATION    KNEE SURGERY Left     NERVE BLOCK      PERIPH NERVE BLOCK WRIST MEDIAN W/LOCAL STEROID INFILTRAIT    PLANTAR FASCIA RELEASE Bilateral 2009, 2020 R    June 2020 on R    [place] COLONOSCOPY FLX DX W/COLLJ SPEC WHEN PFRMD N/A 08/02/2018    Procedure: EGD AND COLONOSCOPY;  Surgeon: Mulugeta Gill MD;  [facility]: [facility];  Service: Gastroenterology    NH EXC B9 LESION MRGN XCP SK TG T/A/L 0.5 CM/< N/A 3/2/2023    Procedure: EXCISION WIDE LESION LEFT UPPER BACK;  Surgeon: Maya Chavez MD;  [facility];  Service: General    NH FASCIECTOMY PLANTAR FASCIA PARTIAL SPX Right 06/09/2020    Procedure: RELEASE FASCIA PLANTAR/FASCIOTOMY ENDOSCOPIC (EPF);  Surgeon: Viraj Harkins DPM;  [facility];  Service: Podiatry    NH RELEASE TARSAL TUNNEL Right 08/11/2020    Procedure: RELEASE TARSAL TUNNEL;  Surgeon: Viraj Harkins DPM;  [facility];  Service: Podiatry    TONSILLECTOMY AND ADENOIDECTOMY         Family History:  Family History   Problem Relation Age of Onset    Cirrhosis Mother     Hypertension Father     Nephrolithiasis Sister     Fibroids Sister     Anxiety disorder Daughter     Asthma Daughter     Heart disease " Maternal Grandfather     Ovarian cancer Paternal Grandmother     Obesity Family     Uterine cancer Maternal Aunt     Fibroids Sister     Fibroids Sister     Ulcerative colitis Son        Social History:  Social History     Socioeconomic History    Marital status:      Spouse name: Not on file    Number of children: Not on file    Years of education: Not on file    Highest education level: Not on file   Occupational History    Not on file   Tobacco Use    Smoking status: Never     Passive exposure: Never    Smokeless tobacco: Never   Vaping Use    Vaping status: Never Used   Substance and Sexual Activity    Alcohol use: Yes     Comment: very rare (1 drink once per month - if that)    Drug use: No    Sexual activity: Not on file   Other Topics Concern    Not on file   Social History Narrative    DAILY COFFEE CONSUMPTION     Social Determinants of Health     Financial Resource Strain: Not on file   Food Insecurity: Not on file   Transportation Needs: Not on file   Physical Activity: Not on file   Stress: Not on file   Social Connections: Not on file   Intimate Partner Violence: Not on file   Housing Stability: Not on file       Objective     Vitals:    07/22/24 1359   BP: 100/70   Pulse: 72   Resp: 16   Temp: (!) 97 °F (36.1 °C)   SpO2: 97%     Wt Readings from Last 3 Encounters:   07/22/24 135 kg (297 lb)   07/09/24 135 kg (297 lb)   06/29/24 (!) 138 kg (303 lb 12.7 oz)       Physical Exam  Vitals and nursing note reviewed.   Constitutional:       Appearance: Normal appearance.   HENT:      Head: Normocephalic and atraumatic.      Right Ear: Tympanic membrane, ear canal and external ear normal.      Left Ear: Tympanic membrane, ear canal and external ear normal.      Nose: Nose normal.      Mouth/Throat:      Mouth: Mucous membranes are moist.   Eyes:      Conjunctiva/sclera: Conjunctivae normal.   Cardiovascular:      Rate and Rhythm: Normal rate and regular rhythm.      Pulses: Normal pulses.      Heart  "sounds: Normal heart sounds.   Pulmonary:      Effort: Pulmonary effort is normal.      Breath sounds: Normal breath sounds.   Abdominal:      General: Bowel sounds are normal.      Palpations: Abdomen is soft.   Musculoskeletal:         General: Normal range of motion.      Cervical back: Normal range of motion and neck supple.   Skin:     General: Skin is warm and dry.      Capillary Refill: Capillary refill takes less than 2 seconds.   Neurological:      General: No focal deficit present.      Mental Status: She is alert and oriented to person, place, and time.   Psychiatric:         Mood and Affect: Mood normal.         Behavior: Behavior normal.         Thought Content: Thought content normal.         Judgment: Judgment normal.         Pertinent Laboratory/Diagnostic Studies:  Lab Results   Component Value Date    GLUCOSE 106 12/12/2015    BUN 18 07/14/2024    CREATININE 0.72 07/14/2024    CALCIUM 9.3 07/14/2024     12/12/2015    K 4.1 07/14/2024    CO2 26 07/14/2024     07/14/2024     Lab Results   Component Value Date    ALT 27 07/14/2024    AST 19 07/14/2024    ALKPHOS 64 07/14/2024    BILITOT 0.52 12/12/2015       Lab Results   Component Value Date    WBC 5.59 07/14/2024    HGB 14.5 07/14/2024    HCT 44.1 07/14/2024    MCV 87 07/14/2024     07/14/2024       No results found for: \"TSH\"    Lab Results   Component Value Date    CHOL 184 12/12/2015     Lab Results   Component Value Date    TRIG 172 (H) 03/02/2024     Lab Results   Component Value Date    HDL 56 03/02/2024     Lab Results   Component Value Date    LDLCALC 108 (H) 03/02/2024     Lab Results   Component Value Date    HGBA1C 6.2 (H) 07/28/2014       Results for orders placed or performed in visit on 07/14/24   Protime-INR   Result Value Ref Range    Protime 13.0 11.6 - 14.5 seconds    INR 0.93 0.84 - 1.19   Alpha-1-antitrypsin   Result Value Ref Range    A-1 Antitrypsin 137 101 - 187 mg/dL   IgG, IgA, IgM   Result Value Ref Range "     66 - 433 mg/dL    IGG 1,050 635 - 1,741 mg/dL     45 - 281 mg/dL   Antimitochondrial antibody   Result Value Ref Range    Mitochondrial Ab <20.0 0.0 - 20.0 Units   Anti-smooth muscle antibody, IgG   Result Value Ref Range    Smooth Muscle Ab 5 0 - 19 Units   DREW Screen w/ Reflex to Titer/Pattern   Result Value Ref Range    DREW Negative Negative   Hepatitis A antibody, total   Result Value Ref Range    Hep A Total Ab Non-reactive Non-Reactive   Hepatitis B surface antibody   Result Value Ref Range    Hep B S Ab 5.81 3-500 mIU/mL mIU/mL   Hepatitis panel, acute   Result Value Ref Range    Hepatitis B Surface Ag Non-reactive Non-Reactive    Hep A IgM Non-reactive Non-Reactive    Hepatitis C Ab Non-reactive Non-Reactive    Hep B C IgM Non-reactive Non-Reactive   TIBC Panel (incl. Iron, TIBC, % Iron Saturation)   Result Value Ref Range    Iron Saturation 23 15 - 50 %    TIBC 322 250 - 450 ug/dL    Iron 74 50 - 212 ug/dL    UIBC 248 155 - 355 ug/dL   Ferritin   Result Value Ref Range    Ferritin 111 11 - 307 ng/mL       Orders Placed This Encounter   Procedures    TSH, 3rd generation with Free T4 reflex       ALLERGIES:  Allergies   Allergen Reactions    Chlorhexidine Rash       Current Medications     Current Outpatient Medications   Medication Sig Dispense Refill    albuterol (2.5 mg/3 mL) 0.083 % nebulizer solution Take 3 mL (2.5 mg total) by nebulization every 4 (four) hours as needed for wheezing or shortness of breath 75 mL 1    budesonide-formoterol (SYMBICORT) 80-4.5 MCG/ACT inhaler Inhale 2 puffs 2 (two) times a day 10.2 g 3    levothyroxine (Synthroid) 175 mcg tablet Take 1 tablet (175 mcg total) by mouth daily 90 tablet 1    meloxicam (Mobic) 15 mg tablet Take 1 tablet (15 mg total) by mouth daily 30 tablet 1    montelukast (SINGULAIR) 10 mg tablet TAKE 1 TABLET BY MOUTH DAILY AT BEDTIME 90 tablet 1    Proventil  (90 Base) MCG/ACT inhaler Inhale 2 puffs every 4 (four) hours as needed  for wheezing 20.1 g 5    valACYclovir (VALTREX) 500 mg tablet Take 1 tablet (500 mg total) by mouth daily 90 tablet 1     No current facility-administered medications for this visit.         Health Maintenance     Health Maintenance   Topic Date Due    Zoster Vaccine (1 of 2) Never done    DTaP,Tdap,and Td Vaccines (2 - Td or Tdap) 08/30/2022    Annual Physical  08/01/2024    Breast Cancer Screening: Mammogram  08/22/2024    Influenza Vaccine (1) 09/01/2024    Depression Screening  01/24/2025    COVID-19 Vaccine (2 - 2023-24 season) 08/01/2024 (Originally 9/1/2023)    Colorectal Cancer Screening  08/01/2026    RSV Vaccine Age 60+ Years (1 - 1-dose 60+ series) 06/16/2030    HIV Screening  Completed    Hepatitis C Screening  Completed    RSV Vaccine age 0-20 Months  Aged Out    HIB Vaccine  Aged Out    IPV Vaccine  Aged Out    Hepatitis A Vaccine  Aged Out    Meningococcal ACWY Vaccine  Aged Out    HPV Vaccine  Aged Out    Pneumococcal Vaccine: Pediatrics (0 to 5 Years) and At-Risk Patients (6 to 64 Years)  Discontinued     Immunization History   Administered Date(s) Administered    COVID-19 J&J ([employer]) vaccine 0.5 mL 03/19/2021    INFLUENZA 12/22/2015, 10/17/2016, 12/04/2017, 11/12/2018    Influenza Quadrivalent Preservative Free 3 years and older IM 12/31/2014, 10/17/2016, 12/04/2017    Influenza Quadrivalent, 6-35 Months IM 12/30/2014, 12/22/2015    Influenza, injectable, quadrivalent, preservative free 0.5 mL 10/31/2019    Influenza, recombinant, quadrivalent,injectable, preservative free 11/12/2018, 12/08/2020, 12/09/2021, 11/16/2022    Influenza, seasonal, injectable 10/20/2012, 12/08/2021, 12/08/2021    Tdap 08/30/2012          THEO Lewis

## 2024-07-28 PROBLEM — R10.31 RIGHT INGUINAL PAIN: Status: RESOLVED | Noted: 2024-04-03 | Resolved: 2024-07-28

## 2024-07-28 PROBLEM — R10.11 RUQ PAIN: Status: RESOLVED | Noted: 2024-04-03 | Resolved: 2024-07-28

## 2024-07-28 PROBLEM — S29.011A STRAIN OF LEFT PECTORALIS MUSCLE: Status: ACTIVE | Noted: 2024-07-28

## 2024-07-28 PROBLEM — M25.512 ACUTE PAIN OF LEFT SHOULDER: Status: ACTIVE | Noted: 2024-07-28

## 2024-08-04 DIAGNOSIS — M54.50 CHRONIC BILATERAL LOW BACK PAIN WITHOUT SCIATICA: ICD-10-CM

## 2024-08-04 DIAGNOSIS — M79.604 RIGHT LEG PAIN: ICD-10-CM

## 2024-08-04 DIAGNOSIS — M51.24 HERNIATED THORACIC DISC WITHOUT MYELOPATHY: Primary | ICD-10-CM

## 2024-08-04 DIAGNOSIS — G89.29 CHRONIC BILATERAL LOW BACK PAIN WITHOUT SCIATICA: ICD-10-CM

## 2024-08-05 DIAGNOSIS — E66.01 CLASS 3 SEVERE OBESITY DUE TO EXCESS CALORIES WITH SERIOUS COMORBIDITY AND BODY MASS INDEX (BMI) OF 50.0 TO 59.9 IN ADULT (HCC): Primary | ICD-10-CM

## 2024-08-12 ENCOUNTER — TELEPHONE (OUTPATIENT)
Age: 54
End: 2024-08-12

## 2024-08-12 NOTE — TELEPHONE ENCOUNTER
I do not see  a prior auth started.  Pharmacy states they are waiting on auth.   Reason for call:   [x] Prior Auth  [] Other:     Caller:  [x] Patient  [] [id number] [address]        Medication: Semaglutide-Weight Management (WEGOVY) 0.25 MG/0.5ML     Dose/Frequency:  Inject 0.5 mL (0.25 mg total) under the skin once a week     Quantity: 2 mL    Ordering Provider:   [x] PCP/Provider -   [] Speciality/Provider -     Has the patient tried other medications and failed? If failed, which medications did they fail?    [] No   [] Yes -     Is the patient's insurance updated in EPIC?   [] Yes   [] No     Is a copy of the patient's insurance scanned in EPIC?   [] Yes   [] No

## 2024-08-19 NOTE — TELEPHONE ENCOUNTER
Message sent via [employer].   Good Morning     I just got off the phone with my prescription plan Sandra who has informed me that the doctor's office has not started the preauthorization process for this wegovy prescription.  I am very disappointed in this process as we discussed getting this started as soon as possible before me returning back to work.  Sandra has indicated they started the preauthorization process on their end today and is awaiting the doctors office. Could someone please confirm this process is not started?  I have been waiting patiently for three weeks for this process to begin.        Thank you in advance to this matter     Екатерина Camacho

## 2024-09-12 ENCOUNTER — PATIENT MESSAGE (OUTPATIENT)
Dept: GASTROENTEROLOGY | Facility: CLINIC | Age: 54
End: 2024-09-12

## 2024-09-13 ENCOUNTER — TELEPHONE (OUTPATIENT)
Age: 54
End: 2024-09-13

## 2024-09-13 DIAGNOSIS — R16.0 ENLARGED LIVER: Primary | ICD-10-CM

## 2024-09-13 NOTE — TELEPHONE ENCOUNTER
LVM for pt to call back to speak with RN regarding symptoms reported in MCM. Pt may need to go to ED for Eval based on symptoms.

## 2024-09-15 ENCOUNTER — APPOINTMENT (OUTPATIENT)
Dept: LAB | Facility: HOSPITAL | Age: 54
End: 2024-09-15
Attending: STUDENT IN AN ORGANIZED HEALTH CARE EDUCATION/TRAINING PROGRAM
Payer: COMMERCIAL

## 2024-09-15 DIAGNOSIS — E06.3 HYPOTHYROIDISM DUE TO HASHIMOTO'S THYROIDITIS: ICD-10-CM

## 2024-09-15 DIAGNOSIS — R16.0 ENLARGED LIVER: ICD-10-CM

## 2024-09-15 DIAGNOSIS — E03.8 HYPOTHYROIDISM DUE TO HASHIMOTO'S THYROIDITIS: ICD-10-CM

## 2024-09-15 LAB
ALBUMIN SERPL BCG-MCNC: 3.9 G/DL (ref 3.5–5)
ALP SERPL-CCNC: 60 U/L (ref 34–104)
ALT SERPL W P-5'-P-CCNC: 19 U/L (ref 7–52)
ANION GAP SERPL CALCULATED.3IONS-SCNC: -2 MMOL/L (ref 4–13)
AST SERPL W P-5'-P-CCNC: 18 U/L (ref 13–39)
BASOPHILS # BLD AUTO: 0.04 THOUSANDS/ΜL (ref 0–0.1)
BASOPHILS NFR BLD AUTO: 1 % (ref 0–1)
BILIRUB SERPL-MCNC: 0.39 MG/DL (ref 0.2–1)
BUN SERPL-MCNC: 14 MG/DL (ref 5–25)
CALCIUM SERPL-MCNC: 8.9 MG/DL (ref 8.4–10.2)
CHLORIDE SERPL-SCNC: 111 MMOL/L (ref 96–108)
CO2 SERPL-SCNC: 28 MMOL/L (ref 21–32)
CREAT SERPL-MCNC: 0.78 MG/DL (ref 0.6–1.3)
EOSINOPHIL # BLD AUTO: 0.29 THOUSAND/ΜL (ref 0–0.61)
EOSINOPHIL NFR BLD AUTO: 4 % (ref 0–6)
ERYTHROCYTE [DISTWIDTH] IN BLOOD BY AUTOMATED COUNT: 13.3 % (ref 11.6–15.1)
GFR SERPL CREATININE-BSD FRML MDRD: 86 ML/MIN/1.73SQ M
GLUCOSE P FAST SERPL-MCNC: 109 MG/DL (ref 65–99)
HCT VFR BLD AUTO: 37.8 % (ref 34.8–46.1)
HGB BLD-MCNC: 11.9 G/DL (ref 11.5–15.4)
IMM GRANULOCYTES # BLD AUTO: 0.02 THOUSAND/UL (ref 0–0.2)
IMM GRANULOCYTES NFR BLD AUTO: 0 % (ref 0–2)
INR PPP: 0.97 (ref 0.85–1.19)
LYMPHOCYTES # BLD AUTO: 2.92 THOUSANDS/ΜL (ref 0.6–4.47)
LYMPHOCYTES NFR BLD AUTO: 43 % (ref 14–44)
MCH RBC QN AUTO: 27.7 PG (ref 26.8–34.3)
MCHC RBC AUTO-ENTMCNC: 31.5 G/DL (ref 31.4–37.4)
MCV RBC AUTO: 88 FL (ref 82–98)
MONOCYTES # BLD AUTO: 0.53 THOUSAND/ΜL (ref 0.17–1.22)
MONOCYTES NFR BLD AUTO: 8 % (ref 4–12)
NEUTROPHILS # BLD AUTO: 2.93 THOUSANDS/ΜL (ref 1.85–7.62)
NEUTS SEG NFR BLD AUTO: 44 % (ref 43–75)
NRBC BLD AUTO-RTO: 0 /100 WBCS
PLATELET # BLD AUTO: 231 THOUSANDS/UL (ref 149–390)
PMV BLD AUTO: 10.6 FL (ref 8.9–12.7)
POTASSIUM SERPL-SCNC: 4.3 MMOL/L (ref 3.5–5.3)
PROT SERPL-MCNC: 6.6 G/DL (ref 6.4–8.4)
PROTHROMBIN TIME: 13.4 SECONDS (ref 12.3–15)
RBC # BLD AUTO: 4.29 MILLION/UL (ref 3.81–5.12)
SODIUM SERPL-SCNC: 137 MMOL/L (ref 135–147)
WBC # BLD AUTO: 6.73 THOUSAND/UL (ref 4.31–10.16)

## 2024-09-15 PROCEDURE — 36415 COLL VENOUS BLD VENIPUNCTURE: CPT

## 2024-09-15 PROCEDURE — 80053 COMPREHEN METABOLIC PANEL: CPT

## 2024-09-15 PROCEDURE — 85025 COMPLETE CBC W/AUTO DIFF WBC: CPT

## 2024-09-15 PROCEDURE — 85610 PROTHROMBIN TIME: CPT

## 2024-09-16 NOTE — TELEPHONE ENCOUNTER
Pt never called back to complete triage of symptoms. Completed CBC, CMP & PT/INR 9/15/24. FU appt w/ C Albin weiner. Provider please review labs and provide recommendations to relay to pt

## 2024-09-18 ENCOUNTER — PREP FOR PROCEDURE (OUTPATIENT)
Dept: GASTROENTEROLOGY | Facility: CLINIC | Age: 54
End: 2024-09-18

## 2024-09-18 DIAGNOSIS — D62 ANEMIA DUE TO ACUTE BLOOD LOSS: ICD-10-CM

## 2024-09-18 DIAGNOSIS — K92.1 MELENA: Primary | ICD-10-CM

## 2024-09-18 NOTE — PATIENT COMMUNICATION
Spoke with patient. Scheduled EGD on Monday 9/23/2024 at [facility] with Dr. HENRI Rodriguez. Patient confirmed EGD procedure. [facility] Please note, patient states that she is not on Wegovy. Medication was not approved by her insurance.    Scheduled date of EGD (as of today): 9/23/2024  Physician performing EGD: Dr. Falguni Rodriguez  Location of EGD: [facility]  Desired bowel prep reviewed with patient: [facility]  Instructions reviewed with patient by: Montse  Clearances:  N/A

## 2024-09-23 ENCOUNTER — ANESTHESIA (OUTPATIENT)
Dept: GASTROENTEROLOGY | Facility: HOSPITAL | Age: 54
End: 2024-09-23
Payer: COMMERCIAL

## 2024-09-23 ENCOUNTER — HOSPITAL ENCOUNTER (OUTPATIENT)
Dept: GASTROENTEROLOGY | Facility: HOSPITAL | Age: 54
Setting detail: OUTPATIENT SURGERY
Discharge: HOME | End: 2024-09-23
Attending: STUDENT IN AN ORGANIZED HEALTH CARE EDUCATION/TRAINING PROGRAM
Payer: COMMERCIAL

## 2024-09-23 ENCOUNTER — ANESTHESIA EVENT (OUTPATIENT)
Dept: GASTROENTEROLOGY | Facility: HOSPITAL | Age: 54
End: 2024-09-23
Payer: COMMERCIAL

## 2024-09-23 VITALS
WEIGHT: 292 LBS | DIASTOLIC BLOOD PRESSURE: 94 MMHG | TEMPERATURE: 97.6 F | HEART RATE: 77 BPM | SYSTOLIC BLOOD PRESSURE: 167 MMHG | RESPIRATION RATE: 18 BRPM | HEIGHT: 63 IN | OXYGEN SATURATION: 96 % | BODY MASS INDEX: 51.74 KG/M2

## 2024-09-23 DIAGNOSIS — D62 ANEMIA DUE TO ACUTE BLOOD LOSS: ICD-10-CM

## 2024-09-23 DIAGNOSIS — K92.1 MELENA: ICD-10-CM

## 2024-09-23 PROCEDURE — 88305 TISSUE EXAM BY PATHOLOGIST: CPT | Performed by: PATHOLOGY

## 2024-09-23 PROCEDURE — 43239 EGD BIOPSY SINGLE/MULTIPLE: CPT | Performed by: STUDENT IN AN ORGANIZED HEALTH CARE EDUCATION/TRAINING PROGRAM

## 2024-09-23 RX ORDER — MIDAZOLAM HYDROCHLORIDE 2 MG/2ML
INJECTION, SOLUTION INTRAMUSCULAR; INTRAVENOUS AS NEEDED
Status: DISCONTINUED | OUTPATIENT
Start: 2024-09-23 | End: 2024-09-23

## 2024-09-23 RX ORDER — LABETALOL HYDROCHLORIDE 5 MG/ML
INJECTION, SOLUTION INTRAVENOUS AS NEEDED
Status: DISCONTINUED | OUTPATIENT
Start: 2024-09-23 | End: 2024-09-23

## 2024-09-23 RX ORDER — GLYCOPYRROLATE 0.2 MG/ML
INJECTION INTRAMUSCULAR; INTRAVENOUS AS NEEDED
Status: DISCONTINUED | OUTPATIENT
Start: 2024-09-23 | End: 2024-09-23

## 2024-09-23 RX ORDER — PROPOFOL 10 MG/ML
INJECTION, EMULSION INTRAVENOUS AS NEEDED
Status: DISCONTINUED | OUTPATIENT
Start: 2024-09-23 | End: 2024-09-23

## 2024-09-23 RX ORDER — KETAMINE HCL IN NACL, ISO-OSM 100MG/10ML
SYRINGE (ML) INJECTION AS NEEDED
Status: DISCONTINUED | OUTPATIENT
Start: 2024-09-23 | End: 2024-09-23

## 2024-09-23 RX ORDER — SODIUM CHLORIDE, SODIUM LACTATE, POTASSIUM CHLORIDE, CALCIUM CHLORIDE 600; 310; 30; 20 MG/100ML; MG/100ML; MG/100ML; MG/100ML
INJECTION, SOLUTION INTRAVENOUS CONTINUOUS PRN
Status: DISCONTINUED | OUTPATIENT
Start: 2024-09-23 | End: 2024-09-23

## 2024-09-23 RX ORDER — LIDOCAINE HYDROCHLORIDE 20 MG/ML
INJECTION, SOLUTION EPIDURAL; INFILTRATION; INTRACAUDAL; PERINEURAL AS NEEDED
Status: DISCONTINUED | OUTPATIENT
Start: 2024-09-23 | End: 2024-09-23

## 2024-09-23 RX ADMIN — PROPOFOL 50 MG: 10 INJECTION, EMULSION INTRAVENOUS at 11:05

## 2024-09-23 RX ADMIN — MIDAZOLAM 1 MG: 1 INJECTION INTRAMUSCULAR; INTRAVENOUS at 11:00

## 2024-09-23 RX ADMIN — LABETALOL HYDROCHLORIDE 5 MG: 5 INJECTION, SOLUTION INTRAVENOUS at 11:12

## 2024-09-23 RX ADMIN — LABETALOL HYDROCHLORIDE 10 MG: 5 INJECTION, SOLUTION INTRAVENOUS at 11:19

## 2024-09-23 RX ADMIN — LIDOCAINE HYDROCHLORIDE 100 MG: 20 INJECTION, SOLUTION EPIDURAL; INFILTRATION; INTRACAUDAL at 11:05

## 2024-09-23 RX ADMIN — LABETALOL HYDROCHLORIDE 5 MG: 5 INJECTION, SOLUTION INTRAVENOUS at 11:14

## 2024-09-23 RX ADMIN — GLYCOPYRROLATE 0.2 MG: 0.2 INJECTION INTRAMUSCULAR; INTRAVENOUS at 11:00

## 2024-09-23 RX ADMIN — MIDAZOLAM 1 MG: 1 INJECTION INTRAMUSCULAR; INTRAVENOUS at 11:05

## 2024-09-23 RX ADMIN — Medication 50 MG: at 11:05

## 2024-09-23 RX ADMIN — SODIUM CHLORIDE, SODIUM LACTATE, POTASSIUM CHLORIDE, AND CALCIUM CHLORIDE: .6; .31; .03; .02 INJECTION, SOLUTION INTRAVENOUS at 10:34

## 2024-09-23 NOTE — ANESTHESIA POSTPROCEDURE EVALUATION
Post-Op Assessment Note    CV Status:  Stable  Pain Score: 0    Pain management: adequate       Mental Status:  Alert and awake   Hydration Status:  Euvolemic   PONV Controlled:  Controlled   Airway Patency:  Patent  Two or more mitigation strategies used for obstructive sleep apnea   Post Op Vitals Reviewed: Yes    No anethesia notable event occurred.    Staff: CRNA, Anesthesiologist   Comments: labetalol for HTN in recovery area              BP   180/70   Temp      Pulse  80   Resp   16   SpO2   100

## 2024-09-23 NOTE — H&P
"History and Physical - [facility]  Екатерина Camacho 54 y.o. female MRN: [id number]      HPI: Екатерина Camacho is a 54 y.o. year-old female who presents for melena and acute blood loss anemia .    REVIEW OF SYSTEMS: Per the HPI, and otherwise unremarkable.    PAST MEDICAL/SURGICAL HISTORY:  Past Medical History:   Diagnosis Date    Acute pain of right shoulder     [id number] RESOLVED//seen by pain management    Allergic     Anesthesia     \"sometimes wakes up before procedure done ie, with MRI and EGD\"    Anxiety     [id number]  LAST ASSESSED    Arthritis     left knee    Asthma     allergy induced    Bulging of intervertebral disc between L4 and L5     Bursitis of right knee     better now    Depression     Ear infection     left PCP placed pt on antibiotic and ear drops    Elevated AST (SGOT)     [id number] RESOLVED    GERD (gastroesophageal reflux disease)     Goiter     [id number] RESOLVED//pt denies states has \"inflamed thyroid\"    Hiatal hernia     Hypothyroidism     [id number] RESOLVED//not on meds    Lipoma     left scapular area    Lump of axilla, right     [id number] RESOLVED marker clip in rt breast    Obstructive sleep apnea     [id number] RESOLVED    Psoriasis     Rash     under bilat breasts and folds of abdomen,, pt reports Dr Harkins aware and dermatologist is treating/pt using ketoconazole cream as directed    Shortness of breath     \"with exertion\"    Use of cane as ambulatory aid     occas        Past Surgical History:   Procedure Laterality Date    CARPAL TUNNEL RELEASE Bilateral     COLONOSCOPY      EGD      HYSTERECTOMY  12/30/2010    WITH ENDOMETERIAL ABLATION    KNEE SURGERY Left     NERVE BLOCK      PERIPH NERVE BLOCK WRIST MEDIAN W/LOCAL STEROID INFILTRAIT    PLANTAR FASCIA RELEASE Bilateral 2009, 2020 R    June 2020 on R    [place] COLONOSCOPY FLX DX W/COLLJ SPEC WHEN PFRMD N/A 08/02/2018    Procedure: EGD AND COLONOSCOPY;  Surgeon: Mulugeta Gill MD;  [facility];  Service: " Gastroenterology    WA EXC B9 LESION MRGN XCP SK TG T/A/L 0.5 CM/< N/A 3/2/2023    Procedure: EXCISION WIDE LESION LEFT UPPER BACK;  Surgeon: Maya Chavez MD;  [facility];  Service: General    WA FASCIECTOMY PLANTAR FASCIA PARTIAL SPX Right 06/09/2020    Procedure: RELEASE FASCIA PLANTAR/FASCIOTOMY ENDOSCOPIC (EPF);  Surgeon: Viraj Harkins DPM;  [facility];  Service: Podiatry    WA RELEASE TARSAL TUNNEL Right 08/11/2020    Procedure: RELEASE TARSAL TUNNEL;  Surgeon: Viraj Harkins DPM;  [facility];  Service: Podiatry    TONSILLECTOMY AND ADENOIDECTOMY         FAMILY/SOCIAL HISTORY:  Family History   Problem Relation Age of Onset    Cirrhosis Mother     Hypertension Father     Nephrolithiasis Sister     Fibroids Sister     Fibroids Sister     Fibroids Sister     Anxiety disorder Daughter     Asthma Daughter     Ulcerative colitis Son     Heart disease Maternal Grandfather     Ovarian cancer Paternal Grandmother     Colon cancer Paternal Grandmother     Uterine cancer Maternal Aunt     Obesity Family        Social History     Tobacco Use    Smoking status: Never     Passive exposure: Never    Smokeless tobacco: Never   Vaping Use    Vaping status: Never Used   Substance Use Topics    Alcohol use: Yes     Comment: very rare (1 drink once per month - if that)    Drug use: No       Meds/Allergies       Current Outpatient Medications:     levothyroxine (Synthroid) 175 mcg tablet    albuterol (2.5 mg/3 mL) 0.083 % nebulizer solution    budesonide-formoterol (SYMBICORT) 80-4.5 MCG/ACT inhaler    meloxicam (Mobic) 15 mg tablet    montelukast (SINGULAIR) 10 mg tablet    Proventil  (90 Base) MCG/ACT inhaler    Semaglutide-Weight Management (WEGOVY) 0.25 MG/0.5ML    valACYclovir (VALTREX) 500 mg tablet  No current facility-administered medications for this encounter.    Facility-Administered Medications Ordered in Other Encounters:     lactated ringers infusion, , Intravenous,  "Continuous PRN, New Bag at 09/23/24 1034    Allergies   Allergen Reactions    Chlorhexidine Rash       Objective     /73   Pulse 84   Temp (!) 96.5 °F (35.8 °C) (Temporal)   Resp 16   Ht 5' 3\" (1.6 m)   Wt 132 kg (292 lb)   SpO2 96%   BMI 51.73 kg/m²   PHYSICAL EXAM    GEN: NAD  CARDIO: RRR  PULM: CTA bilaterally  ABD: soft, non-tender, non-distended  EXT: no lower extremity edema  NEURO: AAOx3    ASSESSMENT/PLAN:  54 y.o. year old female here for EGD. she is stable and optimized for her procedure. Informed consent was obtained for the procedure.  Risks of infection, perforation and hemorrhage were discussed. The patient was agreeable to proceed with the procedure.         "

## 2024-09-23 NOTE — ANESTHESIA PREPROCEDURE EVALUATION
Procedure:  EGD    Relevant Problems   CARDIO   (+) Chronic left-sided thoracic back pain      ENDO   (+) Hypothyroidism due to Hashimoto's thyroiditis      GI/HEPATIC   (+) Enlarged liver   (+) Fatty liver   (+) Gastroesophageal reflux disease without esophagitis      GYN   (+) History of hysterectomy      MUSCULOSKELETAL   (+) Chronic bilateral low back pain without sciatica   (+) Chronic left-sided thoracic back pain   (+) Costovertebral angle pain   (+) Strain of left pectoralis muscle      NEURO/PSYCH   (+) Anxiety   (+) Chronic bilateral low back pain without sciatica   (+) Chronic left-sided thoracic back pain      PULMONARY   (+) Mild intermittent asthma      11/2023  Normal study with artifact  Fair exercise capacity for age   No chest pain with exercise  Negative exercise stress ECG for ischemia  Myocardial perfusion imaging without diagnostic evidence for ischemia or infarct  Small sized, moderate intensity, paradoxical perfusion defect of the apical anterior segment due to breast attenuation.  Small sized, mild intensity, reversible perfusion defect of the mid anterior segment. This fully corrects on prone imaging and is likely due to shifting breast attenuation artifact.  Calculated LVEF >70%. Decreased anterior wall counts with normal wall motion. No TID.    Physical Exam    Airway    Mallampati score: II  TM Distance: >3 FB  Neck ROM: full     Dental   No notable dental hx     Cardiovascular  Rhythm: regular, Rate: normal    Pulmonary   Breath sounds clear to auscultation    Other Findings  Intercisor Distance > 3cm    post-pubertal.      Anesthesia Plan  ASA Score- 3     Anesthesia Type- IV sedation with anesthesia with ASA Monitors.         Additional Monitors:     Airway Plan:     Comment: NPO appropriate. Discussed benefits/risks of monitored anesthetic care which involves providing a dynamic level of mild to deep sedation. Complications include awareness and/or airway obstruction/aspiration which  may necessitate conversion to general anesthesia. All questions answered. Patient understands and wishes to proceed. .       Plan Factors-Exercise tolerance (METS): >4 METS.    Chart reviewed. EKG reviewed.  Existing labs reviewed.                   Induction-     Postoperative Plan- Plan for postoperative opioid use.         Informed Consent- Anesthetic plan and risks discussed with patient.  I personally reviewed this patient with the CRNA. Discussed and agreed on the Anesthesia Plan with the CRNA..

## 2024-09-25 PROCEDURE — 88305 TISSUE EXAM BY PATHOLOGIST: CPT | Performed by: PATHOLOGY

## 2024-09-27 ENCOUNTER — PREP FOR PROCEDURE (OUTPATIENT)
Age: 54
End: 2024-09-27

## 2024-09-27 ENCOUNTER — TELEPHONE (OUTPATIENT)
Age: 54
End: 2024-09-27

## 2024-09-27 DIAGNOSIS — K21.9 GASTROESOPHAGEAL REFLUX DISEASE WITHOUT ESOPHAGITIS: Primary | ICD-10-CM

## 2024-09-27 RX ORDER — PANTOPRAZOLE SODIUM 40 MG/1
40 TABLET, DELAYED RELEASE ORAL 2 TIMES DAILY
Qty: 180 TABLET | Refills: 0 | Status: SHIPPED | OUTPATIENT
Start: 2024-09-27 | End: 2024-12-26

## 2024-09-27 RX ORDER — PANTOPRAZOLE SODIUM 40 MG/1
40 TABLET, DELAYED RELEASE ORAL DAILY
Qty: 90 TABLET | Refills: 0 | Status: SHIPPED | OUTPATIENT
Start: 2024-09-27 | End: 2024-09-27

## 2024-09-27 NOTE — TELEPHONE ENCOUNTER
Left VM regarding pathology results which showed increased eosinophils in the lower esophagus (30 per HPF) and proximal esophagus (3 per HPF). Findings are c/w reflux esophagitis for which I recommend starting PPI BID and repeating EGD in 3 months.     Clerical, can you please contact the patient to schedule an EGD?

## 2024-10-07 ENCOUNTER — RA CDI HCC (OUTPATIENT)
Dept: OTHER | Facility: HOSPITAL | Age: 54
End: 2024-10-07

## 2024-10-07 NOTE — PROGRESS NOTES
HCC coding opportunities       Chart reviewed, no opportunity found: CHART REVIEWED, NO OPPORTUNITY FOUND        Patients Insurance        Commercial Insurance: [employer]

## 2024-10-09 ENCOUNTER — OFFICE VISIT (OUTPATIENT)
Age: 54
End: 2024-10-09
Payer: COMMERCIAL

## 2024-10-09 VITALS
OXYGEN SATURATION: 96 % | BODY MASS INDEX: 51.91 KG/M2 | WEIGHT: 293 LBS | HEART RATE: 90 BPM | TEMPERATURE: 97 F | HEIGHT: 63 IN | DIASTOLIC BLOOD PRESSURE: 80 MMHG | SYSTOLIC BLOOD PRESSURE: 120 MMHG

## 2024-10-09 DIAGNOSIS — Z12.11 SCREENING FOR COLON CANCER: ICD-10-CM

## 2024-10-09 DIAGNOSIS — K21.9 GASTROESOPHAGEAL REFLUX DISEASE WITHOUT ESOPHAGITIS: ICD-10-CM

## 2024-10-09 DIAGNOSIS — K92.1 MELENA: ICD-10-CM

## 2024-10-09 DIAGNOSIS — K76.0 METABOLIC DYSFUNCTION-ASSOCIATED STEATOTIC LIVER DISEASE (MASLD): Primary | ICD-10-CM

## 2024-10-09 PROCEDURE — 99213 OFFICE O/P EST LOW 20 MIN: CPT | Performed by: FAMILY MEDICINE

## 2024-10-09 NOTE — PATIENT INSTRUCTIONS
Scheduled date of EGD(as of today): 01/22/2025  Physician performing EGD: Dr. Rodriguez  Location of EGD: [facility]  Instructions reviewed with patient by: Kami  Clearances: N/A    Patient states that she is not currently Wegovy. Advised patient to make the office aware if she does start medication prior to EGD as medication will need to be held prior to procedure.

## 2024-10-09 NOTE — PROGRESS NOTES
[facility] - Outpatient Follow-up Note  Екатерина Camacho 54 y.o. female MRN: [id number]  Encounter: [id number]          ASSESSMENT AND PLAN:      1. Metabolic dysfunction-associated steatotic liver disease (MASLD)  Patient with a longstanding history of fatty liver disease but routinely normal liver chemistries. She had a recent RUQ US (4/3/2024) for the evaluation of abdominal pain which redemonstrated hepatomegaly and hepatic steatosis but also noted surface nodularity. Since her last appointment, she had a complete serologic evaluation which was unremarkable for competing causes of liver disease. Her labs continue to show normal liver chemistries, normal liver synthetic function and a preserved platelet count. She also had a US elastography with F0-F1 fibrosis and S3 steatosis.    She is aware the basic pathophysiology of fatty liver disease and potential progress to cirrhosis of left untreated. Unfortunately, she has made significant dietary and lifestyle modifications but continues to struggle with weight loss. She was prescribed Wegovy by her PCP but her insurance requires her to try other weight loss medications prior to GLP-1a therapy. She has a follow-up with her PCP next week to discuss on Rx for phentermine. She is not interested in bariatric surgery.    Congratulated her efforts thus far!  She will continue with her efforts towards steady and sustainable weight loss, optimization of her metabolic risk factors and limiting her EtOH use.   Follow-up with PCP regarding weight loss medications  Plan to monitor her liver chemistries q6 months.   Plan for repeat US elastography x1 year, rather than 2-3 in the absence of advanced hepatic fibrosis, given that she was seen to have hepatic nodularity on imaging.    - Hepatic function panel; Future    2. Gastroesophageal reflux disease without esophagitis  3. Melena  Patient called the office to report melena, in the setting of  NSAID (meloxicam) use, with an associated 2 g hemoglobin drop. She underwent an EGD (9/23/2024) notable for abnormal mucosa with concentric rings and linear furrows in the esophagus, ring in the GE junction, 4 cm hiatal hernia and blood present in the antrum. Biopsies showed reflux esophagitis. She was prescribed pantoprazole 40 to take twice daily but was unaware this was sent to her pharmacy and has not started taking it. It was also recommended she have a repeat EGD x3 months which she has not scheduled. No further overt GI bleeding or NSAID use.    Avoid any/all NSAID use.  Begin taking pantoprazole 40 mg twice daily.  Discussed optimization of PPI and dietary/lifestyle modifications to prevent reflux.  Schedule EGD x3 months for reevaluation.    4. Screening for colon cancer  Patient is up-to-date with CRC screening  Colonoscopy (8/2/2021) notable for one 4 mm colonic polyp, mild diverticulosis and small internal hemorrhoids.  It was recommended that she repeat a colonoscopy x 5 years due to a personal history of colonic polyps.      Follow-up in 6 months or sooner if necessary.    ______________________________________________________________________    SUBJECTIVE: Patient is a 54 y.o. female with PMH significant for obesity, GERD, hypothyroidism, psoriasis, HSV 1 and asthma who presents today for follow-up regarding fatty liver disease and recent EGD.    Interval history  - She had a complete serologic evaluation which has been unremarkable for viral hepatitis, autoimmune hepatitis and other genetic causes of liver disease.  - Labs continue to show normal liver chemistries, normal liver synthetic function and preserved platelet count.  - US elastography with F0-F1 fibrosis and S3 steatosis.  - Her weight is overall stable since her last appointment.   - PCP prescribed Wegovy but insurance denied.     She also called the office to report melena, in the setting of Meloxicam with a 2g hgb drop, and underwent an  "EGD (9/23/2024) which showed abnormal mucosa with concentric rings and linear furrows in the esophagus, a ring in the GE junction, 4 cm hiatal hernia, blood present in the antrum. Biopsies were notable for reflux esophagitis and it was recommended she start PPI twice daily and repeat an EGD in 3 months. Denies any further overt GI bleeding. No NSAID use.     Extended liver history  54 year-old female with history of hypothyroidism and psoriasis who presents for initial evaluation for MASLD. She was diagnosed with fatty liver disease many years ago by imaging. She recently developed RUQ pain and had an US which showed hepatomegaly with fatty infiltration and surface nodularity. Her liver tests, synthetic function and platelets are otherwise normal. She denies excessive EtOH use but does note a family history of LYNCH.       REVIEW OF SYSTEMS IS OTHERWISE NEGATIVE.      Historical Information   Past Medical History:   Diagnosis Date    Acute pain of right shoulder     [id number] RESOLVED//seen by pain management    Allergic     Anesthesia     \"sometimes wakes up before procedure done ie, with MRI and EGD\"    Anxiety     [id number]  LAST ASSESSED    Arthritis     left knee    Asthma     allergy induced    Bulging of intervertebral disc between L4 and L5     Bursitis of right knee     better now    Depression     Ear infection     left PCP placed pt on antibiotic and ear drops    Elevated AST (SGOT)     [id number] RESOLVED    GERD (gastroesophageal reflux disease)     Goiter     [id number] RESOLVED//pt denies states has \"inflamed thyroid\"    Hiatal hernia     Hypothyroidism     [id number] RESOLVED//not on meds    Lipoma     left scapular area    Lump of axilla, right     [id number] RESOLVED marker clip in rt breast    Obstructive sleep apnea     [id number] RESOLVED    Psoriasis     Rash     under bilat breasts and folds of abdomen,, pt reports Dr Shahana vela and dermatologist is treating/pt using ketoconazole cream as " "directed    Shortness of breath     \"with exertion\"    Use of cane as ambulatory aid     occas     Past Surgical History:   Procedure Laterality Date    CARPAL TUNNEL RELEASE Bilateral     COLONOSCOPY      EGD      HYSTERECTOMY  12/30/2010    WITH ENDOMETERIAL ABLATION    KNEE SURGERY Left     NERVE BLOCK      PERIPH NERVE BLOCK WRIST MEDIAN W/LOCAL STEROID INFILTRAIT    PLANTAR FASCIA RELEASE Bilateral 2009, 2020 R    June 2020 on R    NE COLONOSCOPY FLX DX W/COLLJ SPEC WHEN PFRMD N/A 08/02/2018    Procedure: EGD AND COLONOSCOPY;  Surgeon: Mulugeta Gill MD;  Location: [facility];  Service: Gastroenterology    NE EXC B9 LESION MRGN XCP SK TG T/A/L 0.5 CM/< N/A 3/2/2023    Procedure: EXCISION WIDE LESION LEFT UPPER BACK;  Surgeon: Maya Chavez MD;  Location: [facility];  Service: General    NE FASCIECTOMY PLANTAR FASCIA PARTIAL SPX Right 06/09/2020    Procedure: RELEASE FASCIA PLANTAR/FASCIOTOMY ENDOSCOPIC (EPF);  Surgeon: Viraj Harkins DPM;  [facility];  Service: Podiatry    NE RELEASE TARSAL TUNNEL Right 08/11/2020    Procedure: RELEASE TARSAL TUNNEL;  Surgeon: Viraj Harkins DPM;  [facility];  Service: Podiatry    TONSILLECTOMY AND ADENOIDECTOMY       Social History   Social History     Substance and Sexual Activity   Alcohol Use Yes    Comment: very rare (1 drink once per month - if that)     Social History     Substance and Sexual Activity   Drug Use No     Social History     Tobacco Use   Smoking Status Never    Passive exposure: Never   Smokeless Tobacco Never     Family History   Problem Relation Age of Onset    Cirrhosis Mother     Hypertension Father     Nephrolithiasis Sister     Fibroids Sister     Fibroids Sister     Fibroids Sister     Anxiety disorder Daughter     Asthma Daughter     Ulcerative colitis Son     Heart disease Maternal Grandfather     Ovarian cancer Paternal Grandmother     Colon cancer Paternal Grandmother     Uterine cancer Maternal Aunt     Obesity Family  "       Meds/Allergies       Current Outpatient Medications:     albuterol (2.5 mg/3 mL) 0.083 % nebulizer solution    budesonide-formoterol (SYMBICORT) 80-4.5 MCG/ACT inhaler    levothyroxine (Synthroid) 175 mcg tablet    montelukast (SINGULAIR) 10 mg tablet    pantoprazole (PROTONIX) 40 mg tablet    Proventil  (90 Base) MCG/ACT inhaler    Semaglutide-Weight Management (WEGOVY) 0.25 MG/0.5ML    valACYclovir (VALTREX) 500 mg tablet    Allergies   Allergen Reactions    Chlorhexidine Rash           Objective     not currently breastfeeding. There is no height or weight on file to calculate BMI.      PHYSICAL EXAM:      General Appearance:   Alert, cooperative, no distress   HEENT:   Normocephalic, atraumatic, anicteric.     Neck:  Supple, symmetrical, trachea midline   Lungs:   Clear to auscultation bilaterally; no rales, rhonchi or wheezing; respirations unlabored    Heart::   Regular rate and rhythm; no murmur, rub, or gallop.   Abdomen:   Soft, non-tender, non-distended; normal bowel sounds; no masses, no organomegaly    Genitalia:   Deferred    Rectal:   Deferred    Extremities:  No cyanosis, clubbing or edema    Pulses:  2+ and symmetric    Skin:  No jaundice, rashes, or lesions    Lymph nodes:  No palpable cervical lymphadenopathy        Lab Results:   No visits with results within 1 Day(s) from this visit.   Latest known visit with results is:   Hospital Outpatient Visit on 09/23/2024   Component Date Value    Case Report 09/23/2024                      Value:Surgical Pathology Report                         Case: [id number]                                  Authorizing Provider:  Falguni Rodriguez MD            Collected:           09/23/2024 1107              Ordering Location:     [facility]        Received:            09/23/2024 [address] [place] [address] Endoscopy                                                           Pathologist:           Darrel Garcia MD                                                             Specimens:   A) - Stomach, gastric bx                                                                            B) - Esophagus, distal esophagus bx r/o EOE                                                         C) - Esophagus, proximal esophagus bx r/o EOE                                              Final Diagnosis 09/23/2024                      Value:A. Stomach, gastric bx:  Mild chronic inactive gastritis  No H. pylori identified on H&E stained slide      B. Esophagus, distal esophagus bx r/o EOE:  Esophageal mucosa with basal cell hyperplasia and intraepithelial eosinophil granulocytes (up to 30 / 40x high power field)    C. Esophagus, proximal esophagus bx r/o EOE:  Esophageal mucosa with mild basal cell hyperplasia and intraepithelial eosinophil granulocytes (up to 3 / 40x high power field)     See note      Note 09/23/2024                      Value:B. C. The differential diagnosis includes eosinophilic esophagitis and reflux esophagitis. Clinical correlation is suggested.    No dysplasia or malignancy identified    Interpretation performed at [place] [id number]        Additional Information 09/23/2024                      Value:All reported additional testing was performed with appropriately reactive controls.  These tests were developed and their performance characteristics determined by [facility] or appropriate performing facility, though some tests may be performed on tissues which have not been validated for performance characteristics (such as staining performed on alcohol exposed cell blocks and decalcified tissues).  Results should be interpreted with caution and in the context of the patients’ clinical condition. These tests may not be cleared or approved by the U.S. Food and Drug Administration, though the FDA has determined that such clearance or approval is not necessary. These tests are used  "for clinical purposes and they should not be regarded as investigational or for research. This laboratory has been approved by [facility], designated as a high-complexity laboratory and is qualified to perform these tests.  .      Gross Description 09/23/2024                      Value:A. The specimen is received in formalin, labeled with the patient's name and hospital number, and is designated \" gastric biopsy.\"  The specimen consists of 5 tan soft tissue fragments the measures 0.1 to 0.4 cm.  Totally submitted in 1 screen cassette.  B. The specimen is received in formalin, labeled with the patient's name and hospital number, and is designated \" distal esophagus biopsy rule out EOE.\"  The specimen consists of 4 tan soft tissue fragments measuring less than 0.1 to 0.4 cm.  Due to the size and consistency, the entire specimen may not survive processing.  Totally submitted in 1 screen cassette.  C. The specimen is received in formalin, labeled with the patient's name and hospital number, and is designated \" proximal esophagus biopsy rule out EOE.\"  The specimen consists of a single tan soft tissue fragment that is 0.4 cm.  Totally submitted in 1 screen cassette.  Note: The estimated total formalin fixation time based upon information provided by the submitting                           clinician and the standard processing schedule is under 72 hours.  TStevens      Clinical Information 09/23/2024                      Value:Melena   Abnormal mucosa with concentric rings and linear furrows in the esophagus; performed cold forceps biopsy to rule out eosinophilic esophagitis  · Ring in the GE junction (GE junction)  · 4 cm hiatal hernia - GE junction 36 cm from the incisors, diaphragmatic impression 40 cm from the incisors:  Hill classification: Grade III  · Blood present in the antrum  · Performed forceps biopsies in the body of the stomach and antrum to rule out H. pylori  · The duodenum appeared normal.   "         Radiology Results:   EGD    Result Date: 9/23/2024  Narrative: Table formatting from the original result was not included. [facility] [address] [place] [address] [phone] DATE OF SERVICE: 9/23/24 PHYSICIAN(S): Attending: Falguni Rodriguez MD Fellow: No Staff Documented INDICATION: Melena, Anemia due to acute blood loss POST-OP DIAGNOSIS: See the impression below. PREPROCEDURE: Informed consent was obtained for the procedure, including sedation.  Risks of perforation, hemorrhage, adverse drug reaction and aspiration were discussed. The patient was placed in the left lateral decubitus position. Patient was explained about the risks and benefits of the procedure. Risks including but not limited to bleeding, infection, and perforation were explained in detail. Also explained about less than 100% sensitivity with the exam and other alternatives. PROCEDURE: EGD DETAILS OF PROCEDURE: Patient was taken to the procedure room where a time out was performed to confirm correct patient and correct procedure. The patient underwent monitored anesthesia care, which was administered by an anesthesia professional. The patient's blood pressure, heart rate, level of consciousness, respirations, oxygen, ECG and ETCO2 were monitored throughout the procedure. The scope was introduced through the mouth and advanced to the second part of the duodenum. Retroflexion was performed in the fundus. The patient experienced no blood loss. The procedure was not difficult. The patient tolerated the procedure well. There were no apparent adverse events. ANESTHESIA INFORMATION: ASA: III Anesthesia Type: IV Sedation with Anesthesia MEDICATIONS: No administrations occurring from 1057 to 1113 on 09/23/24 FINDINGS: Abnormal mucosa with concentric rings and linear furrows in the esophagus; performed cold forceps biopsy to rule out eosinophilic esophagitis Ring in the GE junction (GE junction) 4 cm hiatal  hernia - GE junction 36 cm from the incisors, diaphragmatic impression 40 cm from the incisors:  Hill classification: Grade III Blood present in the antrum Performed forceps biopsies in the body of the stomach and antrum to rule out H. pylori The duodenum appeared normal. SPECIMENS: ID Type Source Tests Collected by Time Destination 1 : gastric bx Tissue Stomach TISSUE EXAM Falguni Rodriguez MD 9/23/2024 11:07 AM  2 : distal esophagus bx r/o EOE Tissue Esophagus TISSUE EXAM Falguni Rodriguez MD 9/23/2024 11:10 AM  3 : proximal esophagus bx r/o EOE Tissue Esophagus TISSUE EXAM Falguni Rodriguez MD 9/23/2024 11:11 AM      Impression: Abnormal mucosa with concentric rings and linear furrows in the esophagus; performed cold forceps biopsy Ring in the GE junction 4 cm hiatal hernia Blood present in the antrum Performed forceps biopsies in the body of the stomach and antrum to rule out H. pylori The duodenum appeared normal. RECOMMENDATION: Await pathology results Follow up with me in clinic Avoid NSAIDs Recommend fiber supplementation    MD Brooklyn Meza PA-C     **Please note: Dictation voice to text software may have been used in the creation of this record. Occasional wrong word or “sound alike” substitutions may have occurred due to the inherent limitations of voice recognition software. Read the chart carefully and recognize, using context, where substitutions have occurred.**

## 2024-10-12 ENCOUNTER — APPOINTMENT (OUTPATIENT)
Dept: LAB | Facility: HOSPITAL | Age: 54
End: 2024-10-12
Payer: COMMERCIAL

## 2024-10-12 LAB — TSH SERPL DL<=0.05 MIU/L-ACNC: 1.56 UIU/ML (ref 0.45–4.5)

## 2024-10-12 PROCEDURE — 84443 ASSAY THYROID STIM HORMONE: CPT

## 2024-10-14 ENCOUNTER — OFFICE VISIT (OUTPATIENT)
Dept: FAMILY MEDICINE CLINIC | Facility: CLINIC | Age: 54
End: 2024-10-14
Payer: COMMERCIAL

## 2024-10-14 VITALS
HEART RATE: 105 BPM | RESPIRATION RATE: 17 BRPM | DIASTOLIC BLOOD PRESSURE: 80 MMHG | WEIGHT: 293 LBS | TEMPERATURE: 97.5 F | HEIGHT: 63 IN | BODY MASS INDEX: 51.91 KG/M2 | SYSTOLIC BLOOD PRESSURE: 146 MMHG | OXYGEN SATURATION: 100 %

## 2024-10-14 DIAGNOSIS — E66.01 CLASS 3 SEVERE OBESITY DUE TO EXCESS CALORIES WITH SERIOUS COMORBIDITY AND BODY MASS INDEX (BMI) OF 50.0 TO 59.9 IN ADULT (HCC): ICD-10-CM

## 2024-10-14 DIAGNOSIS — Z23 NEED FOR INFLUENZA VACCINATION: ICD-10-CM

## 2024-10-14 DIAGNOSIS — Z00.00 ANNUAL PHYSICAL EXAM: Primary | ICD-10-CM

## 2024-10-14 DIAGNOSIS — E66.813 CLASS 3 SEVERE OBESITY DUE TO EXCESS CALORIES WITH SERIOUS COMORBIDITY AND BODY MASS INDEX (BMI) OF 50.0 TO 59.9 IN ADULT (HCC): ICD-10-CM

## 2024-10-14 DIAGNOSIS — Z12.31 ENCOUNTER FOR SCREENING MAMMOGRAM FOR BREAST CANCER: ICD-10-CM

## 2024-10-14 PROCEDURE — 90471 IMMUNIZATION ADMIN: CPT

## 2024-10-14 PROCEDURE — 90673 RIV3 VACCINE NO PRESERV IM: CPT

## 2024-10-14 PROCEDURE — 99396 PREV VISIT EST AGE 40-64: CPT | Performed by: NURSE PRACTITIONER

## 2024-10-14 RX ORDER — PHENTERMINE HYDROCHLORIDE 37.5 MG/1
37.5 TABLET ORAL DAILY
Qty: 30 TABLET | Refills: 5 | Status: SHIPPED | OUTPATIENT
Start: 2024-10-14

## 2024-10-14 NOTE — PROGRESS NOTES
Adult Annual Physical  Name: Екатерина Camacho      : 1970      MRN: [id number]  Encounter Provider: THEO Lewis  Encounter Date: 10/14/2024   Encounter department: [facility]    Assessment & Plan  Annual physical exam         Encounter for screening mammogram for breast cancer    Orders:    Mammo screening bilateral w 3d and cad; Future    Class 3 severe obesity due to excess calories with serious comorbidity and body mass index (BMI) of 50.0 to 59.9 in adult (HCC)      Orders:    phentermine (ADIPEX-P) 37.5 MG tablet; Take 1 tablet (37.5 mg total) by mouth in the morning    Need for influenza vaccination    Orders:    influenza vaccine, recombinant, PF, 0.5 mL IM (Flublok)    Immunizations and preventive care screenings were discussed with patient today. Appropriate education was printed on patient's after visit summary.    Counseling:  Alcohol/drug use: discussed moderation in alcohol intake, the recommendations for healthy alcohol use, and avoidance of illicit drug use.  Dental Health: discussed importance of regular tooth brushing, flossing, and dental visits.  Injury prevention: discussed safety/seat belts, safety helmets, smoke detectors, carbon monoxide detectors, and smoking near bedding or upholstery.  Sexual health: discussed sexually transmitted diseases, partner selection, use of condoms, avoidance of unintended pregnancy, and contraceptive alternatives.  Exercise: the importance of regular exercise/physical activity was discussed. Recommend exercise 3-5 times per week for at least 30 minutes.       Depression Screening and Follow-up Plan: Patient was screened for depression during today's encounter. They screened negative with a PHQ-2 score of 0.        History of Present Illness     Adult Annual Physical:  Patient presents for annual physical. Here for wellness exam  Liver disease is under control   Stopped meloxicam due to having bloody stool  .     Diet and Physical  "Activity:  - Diet/Nutrition: well balanced diet.  - Exercise: 1-2 times a week on average.    Depression Screening:  - PHQ-2 Score: 0    General Health:  - Sleep: sleeps well.  - Hearing: normal hearing right ear and normal hearing left ear.  - Vision: no vision problems.  - Dental: regular dental visits.    /GYN Health:    - Menopause: postmenopausal.   - History of STDs: no    Advanced Care Planning:  - Has an advanced directive?: no    - Has a durable medical POA?: no    - ACP document given to patient?: no      Review of Systems   Constitutional:  Negative for fatigue and fever.   HENT:  Negative for congestion, postnasal drip and rhinorrhea.    Eyes:  Negative for photophobia and visual disturbance.   Respiratory:  Negative for cough, shortness of breath and wheezing.    Cardiovascular:  Negative for chest pain and palpitations.   Gastrointestinal:  Negative for constipation, diarrhea, nausea and vomiting.   Genitourinary:  Negative for dysuria and frequency.   Musculoskeletal:  Negative for arthralgias and myalgias.   Skin:  Negative for rash.   Neurological:  Negative for dizziness, light-headedness and headaches.   Hematological:  Negative for adenopathy.   Psychiatric/Behavioral:  Negative for dysphoric mood and sleep disturbance. The patient is not nervous/anxious.      Pertinent Medical History         Medical History Reviewed by provider this encounter:  Tobacco  Allergies  Meds  Problems  Med Hx  Surg Hx  Fam Hx       Past Medical History   Past Medical History:   Diagnosis Date    Acute pain of right shoulder     [id number] RESOLVED//seen by pain management    Allergic     Anesthesia     \"sometimes wakes up before procedure done ie, with MRI and EGD\"    Anxiety     [id number]  LAST ASSESSED    Arthritis     left knee    Asthma     allergy induced    Bulging of intervertebral disc between L4 and L5     Bursitis of right knee     better now    Depression     Ear infection     left PCP placed pt on " "antibiotic and ear drops    Elevated AST (SGOT)     [id number] RESOLVED    GERD (gastroesophageal reflux disease)     Goiter     [id number] RESOLVED//pt denies states has \"inflamed thyroid\"    Hiatal hernia     Hypothyroidism     [id number] RESOLVED//not on meds    Lipoma     left scapular area    Lump of axilla, right     [id number] RESOLVED marker clip in rt breast    Obstructive sleep apnea     [id number] RESOLVED    Psoriasis     Rash     under bilat breasts and folds of abdomen,, pt reports Dr Harkins aware and dermatologist is treating/pt using ketoconazole cream as directed    Shortness of breath     \"with exertion\"    Use of cane as ambulatory aid     occas     Past Surgical History:   Procedure Laterality Date    CARPAL TUNNEL RELEASE Bilateral     COLONOSCOPY      EGD      HYSTERECTOMY  12/30/2010    WITH ENDOMETERIAL ABLATION    KNEE SURGERY Left     NERVE BLOCK      PERIPH NERVE BLOCK WRIST MEDIAN W/LOCAL STEROID INFILTRAIT    PLANTAR FASCIA RELEASE Bilateral 2009, 2020 R    June 2020 on R    AK COLONOSCOPY FLX DX W/COLLJ SPEC WHEN PFRMD N/A 08/02/2018    Procedure: EGD AND COLONOSCOPY;  Surgeon: Mulugeta Gill MD;  Location: [facility];  Service: Gastroenterology    AK EXC B9 LESION MRGN XCP SK TG T/A/L 0.5 CM/< N/A 3/2/2023    Procedure: EXCISION WIDE LESION LEFT UPPER BACK;  Surgeon: Maya Chavez MD;  Location: [facility];  Service: General    AK FASCIECTOMY PLANTAR FASCIA PARTIAL SPX Right 06/09/2020    Procedure: RELEASE FASCIA PLANTAR/FASCIOTOMY ENDOSCOPIC (EPF);  Surgeon: Viraj Harkins DPM;  Location: [facility];  Service: Podiatry    AK RELEASE TARSAL TUNNEL Right 08/11/2020    Procedure: RELEASE TARSAL TUNNEL;  Surgeon: Viraj Harkins DPM;  Location: [facility];  Service: Podiatry    TONSILLECTOMY AND ADENOIDECTOMY       Family History   Problem Relation Age of Onset    Cirrhosis Mother     Hypertension Father     Nephrolithiasis Sister     Fibroids Sister     Fibroids Sister     Fibroids " Sister     Anxiety disorder Daughter     Asthma Daughter     Ulcerative colitis Son     Heart disease Maternal Grandfather     Ovarian cancer Paternal Grandmother     Colon cancer Paternal Grandmother     Uterine cancer Maternal Aunt     Obesity Family      Current Outpatient Medications on File Prior to Visit   Medication Sig Dispense Refill    albuterol (2.5 mg/3 mL) 0.083 % nebulizer solution Take 3 mL (2.5 mg total) by nebulization every 4 (four) hours as needed for wheezing or shortness of breath 75 mL 1    budesonide-formoterol (SYMBICORT) 80-4.5 MCG/ACT inhaler Inhale 2 puffs 2 (two) times a day 10.2 g 3    levothyroxine (Synthroid) 175 mcg tablet Take 1 tablet (175 mcg total) by mouth daily 90 tablet 1    montelukast (SINGULAIR) 10 mg tablet TAKE 1 TABLET BY MOUTH DAILY AT BEDTIME 90 tablet 1    pantoprazole (PROTONIX) 40 mg tablet Take 1 tablet (40 mg total) by mouth 2 (two) times a day 180 tablet 0    Proventil  (90 Base) MCG/ACT inhaler Inhale 2 puffs every 4 (four) hours as needed for wheezing 20.1 g 5    valACYclovir (VALTREX) 500 mg tablet Take 1 tablet (500 mg total) by mouth daily 90 tablet 1    [DISCONTINUED] Semaglutide-Weight Management (WEGOVY) 0.25 MG/0.5ML Inject 0.5 mL (0.25 mg total) under the skin once a week 2 mL 0     No current facility-administered medications on file prior to visit.     Allergies   Allergen Reactions    Meloxicam GI Bleeding    Chlorhexidine Rash      Current Outpatient Medications on File Prior to Visit   Medication Sig Dispense Refill    albuterol (2.5 mg/3 mL) 0.083 % nebulizer solution Take 3 mL (2.5 mg total) by nebulization every 4 (four) hours as needed for wheezing or shortness of breath 75 mL 1    budesonide-formoterol (SYMBICORT) 80-4.5 MCG/ACT inhaler Inhale 2 puffs 2 (two) times a day 10.2 g 3    levothyroxine (Synthroid) 175 mcg tablet Take 1 tablet (175 mcg total) by mouth daily 90 tablet 1    montelukast (SINGULAIR) 10 mg tablet TAKE 1 TABLET BY  "MOUTH DAILY AT BEDTIME 90 tablet 1    pantoprazole (PROTONIX) 40 mg tablet Take 1 tablet (40 mg total) by mouth 2 (two) times a day 180 tablet 0    Proventil  (90 Base) MCG/ACT inhaler Inhale 2 puffs every 4 (four) hours as needed for wheezing 20.1 g 5    valACYclovir (VALTREX) 500 mg tablet Take 1 tablet (500 mg total) by mouth daily 90 tablet 1    [DISCONTINUED] Semaglutide-Weight Management (WEGOVY) 0.25 MG/0.5ML Inject 0.5 mL (0.25 mg total) under the skin once a week 2 mL 0     No current facility-administered medications on file prior to visit.      Social History     Tobacco Use    Smoking status: Never     Passive exposure: Never    Smokeless tobacco: Never   Vaping Use    Vaping status: Never Used   Substance and Sexual Activity    Alcohol use: Yes     Comment: very rare (1 drink once per month - if that)    Drug use: No    Sexual activity: Not on file       Objective     /80 (BP Location: Left arm, Patient Position: Sitting, Cuff Size: Large)   Pulse 105   Temp 97.5 °F (36.4 °C) (Tympanic)   Resp 17   Ht 5' 3.43\" (1.611 m)   Wt 135 kg (297 lb)   SpO2 100%   BMI 51.91 kg/m²     Physical Exam  Vitals and nursing note reviewed.   Constitutional:       Appearance: Normal appearance. She is obese.   HENT:      Head: Normocephalic and atraumatic.      Right Ear: Tympanic membrane, ear canal and external ear normal.      Left Ear: Tympanic membrane, ear canal and external ear normal.      Nose: Nose normal.      Mouth/Throat:      Mouth: Mucous membranes are moist.   Eyes:      Conjunctiva/sclera: Conjunctivae normal.   Cardiovascular:      Rate and Rhythm: Normal rate and regular rhythm.      Heart sounds: Normal heart sounds.   Pulmonary:      Effort: Pulmonary effort is normal.      Breath sounds: Normal breath sounds.   Abdominal:      General: Bowel sounds are normal.      Palpations: Abdomen is soft.   Musculoskeletal:         General: Normal range of motion.      Cervical back: Normal " range of motion and neck supple.   Skin:     General: Skin is warm and dry.      Capillary Refill: Capillary refill takes less than 2 seconds.   Neurological:      General: No focal deficit present.      Mental Status: She is alert and oriented to person, place, and time.   Psychiatric:         Mood and Affect: Mood normal.         Thought Content: Thought content normal.         Judgment: Judgment normal.       Administrative Statements   I have spent a total time of 30 minutes in caring for this patient on the day of the visit/encounter including Diagnostic results, Prognosis, Risks and benefits of tx options, Instructions for management, Patient and family education, Importance of tx compliance, Risk factor reductions, Impressions, Counseling / Coordination of care, Documenting in the medical record, Reviewing / ordering tests, medicine, procedures  , and Obtaining or reviewing history  .

## 2024-10-14 NOTE — ASSESSMENT & PLAN NOTE
Orders:    phentermine (ADIPEX-P) 37.5 MG tablet; Take 1 tablet (37.5 mg total) by mouth in the morning

## 2024-10-14 NOTE — PATIENT INSTRUCTIONS
"Patient Education     Routine physical for adults   The Basics   Written by the doctors and editors at [facility]   What is a physical? -- A physical is a routine visit, or \"check-up,\" with your doctor. You might also hear it called a \"wellness visit\" or \"preventive visit.\"  During each visit, the doctor will:   Ask about your physical and mental health   Ask about your habits, behaviors, and lifestyle   Do an exam   Give you vaccines if needed   Talk to you about any medicines you take   Give advice about your health   Answer your questions  Getting regular check-ups is an important part of taking care of your health. It can help your doctor find and treat any problems you have. But it's also important for preventing health problems.  A routine physical is different from a \"sick visit.\" A sick visit is when you see a doctor because of a health concern or problem. Since physicals are scheduled ahead of time, you can think about what you want to ask the doctor.  How often should I get a physical? -- It depends on your age and health. In general, for people age 21 years and older:   If you are younger than 50 years, you might be able to get a physical every 3 years.   If you are 50 years or older, your doctor might recommend a physical every year.  If you have an ongoing health condition, like diabetes or high blood pressure, your doctor will probably want to see you more often.  What happens during a physical? -- In general, each visit will include:   Physical exam - The doctor or nurse will check your height, weight, heart rate, and blood pressure. They will also look at your eyes and ears. They will ask about how you are feeling and whether you have any symptoms that bother you.   Medicines - It's a good idea to bring a list of all the medicines you take to each doctor visit. Your doctor will talk to you about your medicines and answer any questions. Tell them if you are having any side effects that bother you. You " "should also tell them if you are having trouble paying for any of your medicines.   Habits and behaviors - This includes:   Your diet   Your exercise habits   Whether you smoke, drink alcohol, or use drugs   Whether you are sexually active   Whether you feel safe at home  Your doctor will talk to you about things you can do to improve your health and lower your risk of health problems. They will also offer help and support. For example, if you want to quit smoking, they can give you advice and might prescribe medicines. If you want to improve your diet or get more physical activity, they can help you with this, too.   Lab tests, if needed - The tests you get will depend on your age and situation. For example, your doctor might want to check your:   Cholesterol   Blood sugar   Iron level   Vaccines - The recommended vaccines will depend on your age, health, and what vaccines you already had. Vaccines are very important because they can prevent certain serious or deadly infections.   Discussion of screening - \"Screening\" means checking for diseases or other health problems before they cause symptoms. Your doctor can recommend screening based on your age, risk, and preferences. This might include tests to check for:   Cancer, such as breast, prostate, cervical, ovarian, colorectal, prostate, lung, or skin cancer   Sexually transmitted infections, such as chlamydia and gonorrhea   Mental health conditions like depression and anxiety  Your doctor will talk to you about the different types of screening tests. They can help you decide which screenings to have. They can also explain what the results might mean.   Answering questions - The physical is a good time to ask the doctor or nurse questions about your health. If needed, they can refer you to other doctors or specialists, too.  Adults older than 65 years often need other care, too. As you get older, your doctor will talk to you about:   How to prevent falling at " home   Hearing or vision tests   Memory testing   How to take your medicines safely   Making sure that you have the help and support you need at home  All topics are updated as new evidence becomes available and our peer review process is complete.  This topic retrieved from [employer] on: May 02, 2024.  Topic 776373 Version 1.0  Release: 32.4.3 - C32.122  © 2024 UpToDate, Inc. and/or its affiliates. All rights reserved.  Consumer Information Use and Disclaimer   Disclaimer: This generalized information is a limited summary of diagnosis, treatment, and/or medication information. It is not meant to be comprehensive and should be used as a tool to help the user understand and/or assess potential diagnostic and treatment options. It does NOT include all information about conditions, treatments, medications, side effects, or risks that may apply to a specific patient. It is not intended to be medical advice or a substitute for the medical advice, diagnosis, or treatment of a health care provider based on the health care provider's examination and assessment of a patient's specific and unique circumstances. Patients must speak with a health care provider for complete information about their health, medical questions, and treatment options, including any risks or benefits regarding use of medications. This information does not endorse any treatments or medications as safe, effective, or approved for treating a specific patient. UpToDate, Inc. and its affiliates disclaim any warranty or liability relating to this information or the use thereof.The use of this information is governed by the Terms of Use, available at https://www.woltersZaldivauwer.com/en/know/clinical-effectiveness-terms. 2024© UpToDate, Inc. and its affiliates and/or licensors. All rights reserved.  Copyright   © 2024 UpToDate, Inc. and/or its affiliates. All rights reserved.

## 2024-11-01 ENCOUNTER — TELEPHONE (OUTPATIENT)
Dept: GASTROENTEROLOGY | Facility: CLINIC | Age: 54
End: 2024-11-01

## 2024-11-30 ENCOUNTER — OFFICE VISIT (OUTPATIENT)
Dept: URGENT CARE | Age: 54
End: 2024-11-30
Payer: COMMERCIAL

## 2024-11-30 VITALS
RESPIRATION RATE: 20 BRPM | BODY MASS INDEX: 49.61 KG/M2 | HEIGHT: 63 IN | TEMPERATURE: 98 F | SYSTOLIC BLOOD PRESSURE: 129 MMHG | OXYGEN SATURATION: 100 % | WEIGHT: 280 LBS | DIASTOLIC BLOOD PRESSURE: 75 MMHG | HEART RATE: 84 BPM

## 2024-11-30 DIAGNOSIS — L03.031 CELLULITIS OF SMALL TOE OF RIGHT FOOT: Primary | ICD-10-CM

## 2024-11-30 DIAGNOSIS — L60.0 INGROWN TOENAIL OF RIGHT FOOT: ICD-10-CM

## 2024-11-30 PROCEDURE — S9083 URGENT CARE CENTER GLOBAL: HCPCS | Performed by: PHYSICIAN ASSISTANT

## 2024-11-30 PROCEDURE — G0382 LEV 3 HOSP TYPE B ED VISIT: HCPCS | Performed by: PHYSICIAN ASSISTANT

## 2024-11-30 RX ORDER — CEPHALEXIN 500 MG/1
500 CAPSULE ORAL EVERY 8 HOURS SCHEDULED
Qty: 21 CAPSULE | Refills: 0 | Status: SHIPPED | OUTPATIENT
Start: 2024-11-30 | End: 2024-12-07

## 2024-11-30 NOTE — PATIENT INSTRUCTIONS
Please monitor for increasing redness, swelling, pain or discharge at injury site and if any occur please return to our clinic.  Warm compresses 20 minutes four times daily  Follow up with podiatrist as discussed  Avoid cutting toe nails so short in the future

## 2024-12-26 ENCOUNTER — TELEPHONE (OUTPATIENT)
Age: 54
End: 2024-12-26

## 2024-12-26 DIAGNOSIS — J06.9 ACUTE UPPER RESPIRATORY INFECTION: Primary | ICD-10-CM

## 2024-12-26 RX ORDER — AMOXICILLIN 875 MG/1
875 TABLET, COATED ORAL 2 TIMES DAILY
Qty: 20 TABLET | Refills: 0 | Status: SHIPPED | OUTPATIENT
Start: 2024-12-26 | End: 2025-01-05

## 2024-12-26 NOTE — TELEPHONE ENCOUNTER
Patient called because she's been having a sore throat and congestion since Tuesday night and she wanted an appointment for today but there are none available. Please call patient to advise if she can be fit on the schedule sometime today. Thank you.

## 2024-12-31 ENCOUNTER — APPOINTMENT (OUTPATIENT)
Dept: LAB | Facility: CLINIC | Age: 54
End: 2024-12-31
Payer: COMMERCIAL

## 2024-12-31 ENCOUNTER — TRANSCRIBE ORDERS (OUTPATIENT)
Dept: LAB | Facility: CLINIC | Age: 54
End: 2024-12-31

## 2024-12-31 DIAGNOSIS — Z79.899 NEED FOR PROPHYLACTIC CHEMOTHERAPY: Primary | ICD-10-CM

## 2024-12-31 PROCEDURE — 86480 TB TEST CELL IMMUN MEASURE: CPT

## 2024-12-31 PROCEDURE — 36415 COLL VENOUS BLD VENIPUNCTURE: CPT

## 2025-01-01 LAB
GAMMA INTERFERON BACKGROUND BLD IA-ACNC: 0.02 IU/ML
M TB IFN-G BLD-IMP: NEGATIVE
M TB IFN-G CD4+ BCKGRND COR BLD-ACNC: 0 IU/ML
M TB IFN-G CD4+ BCKGRND COR BLD-ACNC: 0.01 IU/ML
MITOGEN IGNF BCKGRD COR BLD-ACNC: 9.98 IU/ML

## 2025-01-02 ENCOUNTER — TELEPHONE (OUTPATIENT)
Age: 55
End: 2025-01-02

## 2025-01-02 DIAGNOSIS — K21.9 GASTROESOPHAGEAL REFLUX DISEASE WITHOUT ESOPHAGITIS: ICD-10-CM

## 2025-01-02 RX ORDER — PANTOPRAZOLE SODIUM 40 MG/1
40 TABLET, DELAYED RELEASE ORAL 2 TIMES DAILY
Qty: 60 TABLET | Refills: 5 | Status: SHIPPED | OUTPATIENT
Start: 2025-01-02 | End: 2025-01-08 | Stop reason: SDUPTHER

## 2025-01-08 DIAGNOSIS — K21.9 GASTROESOPHAGEAL REFLUX DISEASE WITHOUT ESOPHAGITIS: ICD-10-CM

## 2025-01-08 DIAGNOSIS — E66.01 CLASS 3 SEVERE OBESITY DUE TO EXCESS CALORIES WITH SERIOUS COMORBIDITY AND BODY MASS INDEX (BMI) OF 50.0 TO 59.9 IN ADULT (HCC): ICD-10-CM

## 2025-01-08 DIAGNOSIS — E66.813 CLASS 3 SEVERE OBESITY DUE TO EXCESS CALORIES WITH SERIOUS COMORBIDITY AND BODY MASS INDEX (BMI) OF 50.0 TO 59.9 IN ADULT (HCC): ICD-10-CM

## 2025-01-08 DIAGNOSIS — E06.3 HYPOTHYROIDISM DUE TO HASHIMOTO'S THYROIDITIS: ICD-10-CM

## 2025-01-08 DIAGNOSIS — B00.9 HERPES SIMPLEX TYPE 1 INFECTION: ICD-10-CM

## 2025-01-09 RX ORDER — LEVOTHYROXINE SODIUM 175 UG/1
175 TABLET ORAL DAILY
Qty: 90 TABLET | Refills: 1 | Status: SHIPPED | OUTPATIENT
Start: 2025-01-09

## 2025-01-09 RX ORDER — PANTOPRAZOLE SODIUM 40 MG/1
40 TABLET, DELAYED RELEASE ORAL 2 TIMES DAILY
Qty: 180 TABLET | Refills: 1 | Status: SHIPPED | OUTPATIENT
Start: 2025-01-09

## 2025-01-09 RX ORDER — VALACYCLOVIR HYDROCHLORIDE 500 MG/1
500 TABLET, FILM COATED ORAL DAILY
Qty: 90 TABLET | Refills: 1 | Status: SHIPPED | OUTPATIENT
Start: 2025-01-09 | End: 2025-07-08

## 2025-01-10 RX ORDER — PHENTERMINE HYDROCHLORIDE 37.5 MG/1
37.5 TABLET ORAL DAILY
Qty: 30 TABLET | Refills: 2 | Status: SHIPPED | OUTPATIENT
Start: 2025-01-10

## 2025-01-15 ENCOUNTER — APPOINTMENT (OUTPATIENT)
Dept: LAB | Facility: HOSPITAL | Age: 55
End: 2025-01-15
Payer: COMMERCIAL

## 2025-01-15 DIAGNOSIS — L40.0 PSORIASIS VULGARIS: ICD-10-CM

## 2025-01-15 DIAGNOSIS — Z79.899 NEED FOR PROPHYLACTIC CHEMOTHERAPY: ICD-10-CM

## 2025-01-15 LAB
ALBUMIN SERPL BCG-MCNC: 4.1 G/DL (ref 3.5–5)
ALP SERPL-CCNC: 74 U/L (ref 34–104)
ALT SERPL W P-5'-P-CCNC: 20 U/L (ref 7–52)
ANION GAP SERPL CALCULATED.3IONS-SCNC: 4 MMOL/L (ref 4–13)
AST SERPL W P-5'-P-CCNC: 18 U/L (ref 13–39)
BASOPHILS # BLD AUTO: 0.04 THOUSANDS/ΜL (ref 0–0.1)
BASOPHILS NFR BLD AUTO: 1 % (ref 0–1)
BILIRUB SERPL-MCNC: 0.46 MG/DL (ref 0.2–1)
BUN SERPL-MCNC: 21 MG/DL (ref 5–25)
CALCIUM SERPL-MCNC: 9.4 MG/DL (ref 8.4–10.2)
CHLORIDE SERPL-SCNC: 106 MMOL/L (ref 96–108)
CO2 SERPL-SCNC: 29 MMOL/L (ref 21–32)
CREAT SERPL-MCNC: 0.78 MG/DL (ref 0.6–1.3)
EOSINOPHIL # BLD AUTO: 0.2 THOUSAND/ΜL (ref 0–0.61)
EOSINOPHIL NFR BLD AUTO: 3 % (ref 0–6)
ERYTHROCYTE [DISTWIDTH] IN BLOOD BY AUTOMATED COUNT: 13.2 % (ref 11.6–15.1)
GFR SERPL CREATININE-BSD FRML MDRD: 86 ML/MIN/1.73SQ M
GLUCOSE P FAST SERPL-MCNC: 112 MG/DL (ref 65–99)
HCT VFR BLD AUTO: 41.9 % (ref 34.8–46.1)
HGB BLD-MCNC: 13.5 G/DL (ref 11.5–15.4)
IMM GRANULOCYTES # BLD AUTO: 0.01 THOUSAND/UL (ref 0–0.2)
IMM GRANULOCYTES NFR BLD AUTO: 0 % (ref 0–2)
LYMPHOCYTES # BLD AUTO: 3.27 THOUSANDS/ΜL (ref 0.6–4.47)
LYMPHOCYTES NFR BLD AUTO: 45 % (ref 14–44)
MCH RBC QN AUTO: 26.5 PG (ref 26.8–34.3)
MCHC RBC AUTO-ENTMCNC: 32.2 G/DL (ref 31.4–37.4)
MCV RBC AUTO: 82 FL (ref 82–98)
MONOCYTES # BLD AUTO: 0.61 THOUSAND/ΜL (ref 0.17–1.22)
MONOCYTES NFR BLD AUTO: 8 % (ref 4–12)
NEUTROPHILS # BLD AUTO: 3.16 THOUSANDS/ΜL (ref 1.85–7.62)
NEUTS SEG NFR BLD AUTO: 43 % (ref 43–75)
NRBC BLD AUTO-RTO: 0 /100 WBCS
PLATELET # BLD AUTO: 215 THOUSANDS/UL (ref 149–390)
PMV BLD AUTO: 10.3 FL (ref 8.9–12.7)
POTASSIUM SERPL-SCNC: 3.7 MMOL/L (ref 3.5–5.3)
PROT SERPL-MCNC: 6.8 G/DL (ref 6.4–8.4)
RBC # BLD AUTO: 5.1 MILLION/UL (ref 3.81–5.12)
SODIUM SERPL-SCNC: 139 MMOL/L (ref 135–147)
WBC # BLD AUTO: 7.29 THOUSAND/UL (ref 4.31–10.16)

## 2025-01-15 PROCEDURE — 80053 COMPREHEN METABOLIC PANEL: CPT

## 2025-01-15 PROCEDURE — 85025 COMPLETE CBC W/AUTO DIFF WBC: CPT

## 2025-01-15 PROCEDURE — 36415 COLL VENOUS BLD VENIPUNCTURE: CPT

## 2025-01-20 ENCOUNTER — RA CDI HCC (OUTPATIENT)
Dept: OTHER | Facility: HOSPITAL | Age: 55
End: 2025-01-20

## 2025-01-20 NOTE — PROGRESS NOTES
HCC coding opportunities          Chart Reviewed number of suggestions sent to Provider: 1  E66.01     Patients Insurance        Commercial Insurance: [employer]

## 2025-01-22 ENCOUNTER — ANESTHESIA EVENT (OUTPATIENT)
Dept: GASTROENTEROLOGY | Facility: HOSPITAL | Age: 55
End: 2025-01-22
Payer: COMMERCIAL

## 2025-01-22 ENCOUNTER — HOSPITAL ENCOUNTER (OUTPATIENT)
Dept: GASTROENTEROLOGY | Facility: HOSPITAL | Age: 55
Setting detail: OUTPATIENT SURGERY
Discharge: HOME | End: 2025-01-22
Attending: STUDENT IN AN ORGANIZED HEALTH CARE EDUCATION/TRAINING PROGRAM
Payer: COMMERCIAL

## 2025-01-22 ENCOUNTER — ANESTHESIA (OUTPATIENT)
Dept: GASTROENTEROLOGY | Facility: HOSPITAL | Age: 55
End: 2025-01-22
Payer: COMMERCIAL

## 2025-01-22 VITALS
HEART RATE: 94 BPM | HEIGHT: 63 IN | RESPIRATION RATE: 18 BRPM | SYSTOLIC BLOOD PRESSURE: 151 MMHG | DIASTOLIC BLOOD PRESSURE: 88 MMHG | BODY MASS INDEX: 51.21 KG/M2 | OXYGEN SATURATION: 97 % | WEIGHT: 289 LBS | TEMPERATURE: 97.6 F

## 2025-01-22 DIAGNOSIS — K21.9 GASTROESOPHAGEAL REFLUX DISEASE WITHOUT ESOPHAGITIS: ICD-10-CM

## 2025-01-22 PROBLEM — Z85.828 HISTORY OF MALIGNANT NEOPLASM OF SKIN: Status: ACTIVE | Noted: 2022-12-20

## 2025-01-22 PROCEDURE — 43239 EGD BIOPSY SINGLE/MULTIPLE: CPT | Performed by: STUDENT IN AN ORGANIZED HEALTH CARE EDUCATION/TRAINING PROGRAM

## 2025-01-22 PROCEDURE — 88305 TISSUE EXAM BY PATHOLOGIST: CPT | Performed by: PATHOLOGY

## 2025-01-22 RX ORDER — SODIUM CHLORIDE, SODIUM LACTATE, POTASSIUM CHLORIDE, CALCIUM CHLORIDE 600; 310; 30; 20 MG/100ML; MG/100ML; MG/100ML; MG/100ML
INJECTION, SOLUTION INTRAVENOUS CONTINUOUS PRN
Status: DISCONTINUED | OUTPATIENT
Start: 2025-01-22 | End: 2025-01-22

## 2025-01-22 RX ORDER — PANTOPRAZOLE SODIUM 40 MG/1
40 TABLET, DELAYED RELEASE ORAL 2 TIMES DAILY
Qty: 180 TABLET | Refills: 1 | Status: SHIPPED | OUTPATIENT
Start: 2025-01-22 | End: 2025-02-07 | Stop reason: SDUPTHER

## 2025-01-22 RX ORDER — KETAMINE HCL IN NACL, ISO-OSM 100MG/10ML
SYRINGE (ML) INJECTION AS NEEDED
Status: DISCONTINUED | OUTPATIENT
Start: 2025-01-22 | End: 2025-01-22

## 2025-01-22 RX ORDER — PROPOFOL 10 MG/ML
INJECTION, EMULSION INTRAVENOUS AS NEEDED
Status: DISCONTINUED | OUTPATIENT
Start: 2025-01-22 | End: 2025-01-22

## 2025-01-22 RX ADMIN — PROPOFOL 50 MG: 10 INJECTION, EMULSION INTRAVENOUS at 12:41

## 2025-01-22 RX ADMIN — PROPOFOL 100 MG: 10 INJECTION, EMULSION INTRAVENOUS at 12:39

## 2025-01-22 RX ADMIN — Medication 50 MG: at 12:41

## 2025-01-22 RX ADMIN — PROPOFOL 50 MG: 10 INJECTION, EMULSION INTRAVENOUS at 12:46

## 2025-01-22 RX ADMIN — SODIUM CHLORIDE, SODIUM LACTATE, POTASSIUM CHLORIDE, AND CALCIUM CHLORIDE: .6; .31; .03; .02 INJECTION, SOLUTION INTRAVENOUS at 12:11

## 2025-01-22 RX ADMIN — PROPOFOL 50 MG: 10 INJECTION, EMULSION INTRAVENOUS at 12:43

## 2025-01-22 NOTE — ANESTHESIA PREPROCEDURE EVALUATION
Procedure:  EGD    Relevant Problems   ENDO   (+) Hypothyroidism due to Hashimoto's thyroiditis      GI/HEPATIC   (+) Enlarged liver   (+) Fatty liver   (+) Gastroesophageal reflux disease without esophagitis      GYN   (+) History of hysterectomy      MUSCULOSKELETAL   (+) Chronic bilateral low back pain without sciatica   (+) Chronic left-sided thoracic back pain   (+) Costovertebral angle pain   (+) Strain of left pectoralis muscle      NEURO/PSYCH   (+) Anxiety   (+) Chronic bilateral low back pain without sciatica   (+) Chronic left-sided thoracic back pain      PULMONARY   (+) Mild intermittent asthma      Digestive   (+) Cold sore      Oncology   (+) Family history of colon cancer      Rheumatology   (+) Herniated thoracic disc without myelopathy      Orthopedic/Musculoskeletal   (+) Acute pain of left shoulder   (+) Ganglion of tendon sheath      Dermatology   (+) Irritant contact dermatitis due to other agents      Other   (+) Class 3 severe obesity due to excess calories with serious comorbidity and body mass index (BMI) of 50.0 to 59.9 in adult (HCC)   (+) Herpes simplex type 1 infection   (+) History of COVID-19      Lab Results   Component Value Date     12/12/2015    SODIUM 139 01/15/2025    K 3.7 01/15/2025     01/15/2025    CO2 29 01/15/2025    ANIONGAP 6 12/12/2015    AGAP 4 01/15/2025    BUN 21 01/15/2025    CREATININE 0.78 01/15/2025    GLUC 103 06/29/2024    GLUF 112 (H) 01/15/2025    CALCIUM 9.4 01/15/2025    AST 18 01/15/2025    ALT 20 01/15/2025    ALKPHOS 74 01/15/2025    PROT 7.4 12/12/2015    TP 6.8 01/15/2025    BILITOT 0.52 12/12/2015    TBILI 0.46 01/15/2025    EGFR 86 01/15/2025     Lab Results   Component Value Date    WBC 7.29 01/15/2025    HGB 13.5 01/15/2025    HCT 41.9 01/15/2025    MCV 82 01/15/2025     01/15/2025       Physical Exam    Airway    Mallampati score: II  TM Distance: <3 FB  Neck ROM: full     Dental       Cardiovascular      Pulmonary      Other  Findings  post-pubertal.      Anesthesia Plan  ASA Score- 4     Anesthesia Type- IV sedation with anesthesia with ASA Monitors.         Additional Monitors:     Airway Plan:            Plan Factors-Exercise tolerance (METS): >4 METS.    Chart reviewed. EKG reviewed. Imaging results reviewed. Existing labs reviewed. Patient summary reviewed.                  Induction- intravenous.    Postoperative Plan-         Informed Consent- Anesthetic plan and risks discussed with patient.  I personally reviewed this patient with the CRNA. Discussed and agreed on the Anesthesia Plan with the CRNA..      NPO Status:  Vitals Value Taken Time   Date of last liquid 01/21/25 01/22/25 1157   Time of last liquid 2245 01/22/25 1157   Date of last solid 01/21/25 01/22/25 1157   Time of last solid 2245 01/22/25 1157

## 2025-01-22 NOTE — ANESTHESIA POSTPROCEDURE EVALUATION
Post-Op Assessment Note    CV Status:  Stable    Pain management: adequate       Mental Status:  Alert and awake   Hydration Status:  Euvolemic   PONV Controlled:  Controlled   Airway Patency:  Patent     Post Op Vitals Reviewed: Yes    No anethesia notable event occurred.    Staff: Anesthesiologist           Last Filed PACU Vitals:  Vitals Value Taken Time   Temp 97.4 °F (36.3 °C) 01/22/25 1255   Pulse 92 01/22/25 1255   /70 01/22/25 1255   Resp 14 01/22/25 1255   SpO2 95 % 01/22/25 1255       Modified Frankie:     Vitals Value Taken Time   Activity 0 01/22/25 1255   Respiration 2 01/22/25 1255   Circulation 2 01/22/25 1255   Consciousness 1 01/22/25 1255   Oxygen Saturation 2 01/22/25 1255     Modified Frankie Score: 7

## 2025-01-22 NOTE — H&P
"History and Physical - [facility]  Екатерина Camacho 54 y.o. female MRN: [id number]      HPI: Екатерина Camacho is a 54 y.o. year-old female who presents for GERD.    REVIEW OF SYSTEMS: Per the HPI, and otherwise unremarkable.    PAST MEDICAL/SURGICAL HISTORY:  Past Medical History:   Diagnosis Date    Acute pain of right shoulder     [id number] RESOLVED//seen by pain management    Allergic     Anesthesia     \"sometimes wakes up before procedure done ie, with MRI and EGD\"    Anxiety     [id number]  LAST ASSESSED    Arthritis     left knee    Asthma     allergy induced    Bulging of intervertebral disc between L4 and L5     Bursitis of right knee     better now    Depression     Ear infection     left PCP placed pt on antibiotic and ear drops    Elevated AST (SGOT)     [id number] RESOLVED    GERD (gastroesophageal reflux disease)     Goiter     [id number] RESOLVED//pt denies states has \"inflamed thyroid\"    Hiatal hernia     Hypothyroidism     [id number] RESOLVED//not on meds    Lipoma     left scapular area    Lump of axilla, right     [id number] RESOLVED marker clip in rt breast    Obstructive sleep apnea     [id number] RESOLVED    Psoriasis     Rash     under bilat breasts and folds of abdomen,, pt reports Dr Shahana vela and dermatologist is treating/pt using ketoconazole cream as directed    Shortness of breath     \"with exertion\"    Use of cane as ambulatory aid     occas        Past Surgical History:   Procedure Laterality Date    CARPAL TUNNEL RELEASE Bilateral     COLONOSCOPY      EGD      HYSTERECTOMY  12/30/2010    WITH ENDOMETERIAL ABLATION    KNEE SURGERY Left     NERVE BLOCK      PERIPH NERVE BLOCK WRIST MEDIAN W/LOCAL STEROID INFILTRAIT    PLANTAR FASCIA RELEASE Bilateral 2009, 2020 R    June 2020 on R    MS COLONOSCOPY FLX DX W/COLLJ SPEC WHEN PFRMD N/A 08/02/2018    Procedure: EGD AND COLONOSCOPY;  Surgeon: Mulugeta Gill MD;  [facility]: [facility];  Service: Gastroenterology    MS EXC B9 " LESION MRGN XCP SK TG T/A/L 0.5 CM/< N/A 3/2/2023    Procedure: EXCISION WIDE LESION LEFT UPPER BACK;  Surgeon: Maya Chavez MD;  [facility];  Service: General    [place] FASCIECTOMY PLANTAR FASCIA PARTIAL SPX Right 06/09/2020    Procedure: RELEASE FASCIA PLANTAR/FASCIOTOMY ENDOSCOPIC (EPF);  Surgeon: Viraj Harkins DPM;  [facility];  Service: Podiatry    [place] RELEASE TARSAL TUNNEL Right 08/11/2020    Procedure: RELEASE TARSAL TUNNEL;  Surgeon: Viraj Harkins DPM;  [facility];  Service: Podiatry    TONSILLECTOMY AND ADENOIDECTOMY         FAMILY/SOCIAL HISTORY:  Family History   Problem Relation Age of Onset    Cirrhosis Mother     Hypertension Father     Nephrolithiasis Sister     Fibroids Sister     Fibroids Sister     Fibroids Sister     Anxiety disorder Daughter     Asthma Daughter     Ulcerative colitis Son     Heart disease Maternal Grandfather     Ovarian cancer Paternal Grandmother     Colon cancer Paternal Grandmother     Uterine cancer Maternal Aunt     Obesity Family        Social History     Tobacco Use    Smoking status: Never     Passive exposure: Never    Smokeless tobacco: Never   Vaping Use    Vaping status: Never Used   Substance Use Topics    Alcohol use: Yes     Comment: very rare (1 drink once per month - if that)    Drug use: No       Meds/Allergies       Current Outpatient Medications:     albuterol (2.5 mg/3 mL) 0.083 % nebulizer solution    budesonide-formoterol (SYMBICORT) 80-4.5 MCG/ACT inhaler    levothyroxine (Synthroid) 175 mcg tablet    montelukast (SINGULAIR) 10 mg tablet    pantoprazole (PROTONIX) 40 mg tablet    phentermine (ADIPEX-P) 37.5 MG tablet    Proventil  (90 Base) MCG/ACT inhaler    valACYclovir (VALTREX) 500 mg tablet    Allergies   Allergen Reactions    Meloxicam GI Bleeding    Chlorhexidine Rash       Objective     There were no vitals taken for this visit.  PHYSICAL EXAM    GEN: NAD  CARDIO: RRR  PULM: CTA bilaterally  ABD: soft,  non-tender, non-distended  EXT: no lower extremity edema  NEURO: AAOx3    ASSESSMENT/PLAN:  54 y.o. year old female here for EGD. she is stable and optimized for her procedure. Informed consent was obtained for the procedure.  Risks of infection, perforation and hemorrhage were discussed. The patient was agreeable to proceed with the procedure.

## 2025-01-24 ENCOUNTER — RESULTS FOLLOW-UP (OUTPATIENT)
Age: 55
End: 2025-01-24

## 2025-01-24 PROCEDURE — 88305 TISSUE EXAM BY PATHOLOGIST: CPT | Performed by: PATHOLOGY

## 2025-01-27 ENCOUNTER — TELEPHONE (OUTPATIENT)
Age: 55
End: 2025-01-27

## 2025-01-27 ENCOUNTER — OFFICE VISIT (OUTPATIENT)
Dept: FAMILY MEDICINE CLINIC | Facility: CLINIC | Age: 55
End: 2025-01-27
Payer: COMMERCIAL

## 2025-01-27 VITALS
SYSTOLIC BLOOD PRESSURE: 130 MMHG | HEIGHT: 63 IN | RESPIRATION RATE: 17 BRPM | OXYGEN SATURATION: 97 % | BODY MASS INDEX: 51.56 KG/M2 | HEART RATE: 118 BPM | TEMPERATURE: 97 F | WEIGHT: 291 LBS | DIASTOLIC BLOOD PRESSURE: 80 MMHG

## 2025-01-27 DIAGNOSIS — J45.20 MILD INTERMITTENT ASTHMA, UNSPECIFIED WHETHER COMPLICATED: ICD-10-CM

## 2025-01-27 DIAGNOSIS — E66.01 CLASS 3 SEVERE OBESITY DUE TO EXCESS CALORIES WITH SERIOUS COMORBIDITY AND BODY MASS INDEX (BMI) OF 50.0 TO 59.9 IN ADULT (HCC): ICD-10-CM

## 2025-01-27 DIAGNOSIS — J45.20 MILD INTERMITTENT ASTHMA WITHOUT COMPLICATION: ICD-10-CM

## 2025-01-27 DIAGNOSIS — K76.0 FATTY LIVER: ICD-10-CM

## 2025-01-27 DIAGNOSIS — E66.813 CLASS 3 SEVERE OBESITY DUE TO EXCESS CALORIES WITH SERIOUS COMORBIDITY AND BODY MASS INDEX (BMI) OF 50.0 TO 59.9 IN ADULT (HCC): ICD-10-CM

## 2025-01-27 DIAGNOSIS — J45.21 MILD INTERMITTENT ASTHMA WITH ACUTE EXACERBATION: ICD-10-CM

## 2025-01-27 DIAGNOSIS — Z12.31 ENCOUNTER FOR SCREENING MAMMOGRAM FOR BREAST CANCER: ICD-10-CM

## 2025-01-27 DIAGNOSIS — E06.3 HYPOTHYROIDISM DUE TO HASHIMOTO'S THYROIDITIS: Primary | ICD-10-CM

## 2025-01-27 PROCEDURE — 99214 OFFICE O/P EST MOD 30 MIN: CPT | Performed by: NURSE PRACTITIONER

## 2025-01-27 RX ORDER — PREDNISONE 10 MG/1
TABLET ORAL
Qty: 18 TABLET | Refills: 0 | Status: SHIPPED | OUTPATIENT
Start: 2025-01-27

## 2025-01-27 RX ORDER — ALBUTEROL SULFATE 0.83 MG/ML
2.5 SOLUTION RESPIRATORY (INHALATION) EVERY 4 HOURS PRN
Qty: 75 ML | Refills: 1 | Status: SHIPPED | OUTPATIENT
Start: 2025-01-27

## 2025-01-27 RX ORDER — BUDESONIDE AND FORMOTEROL FUMARATE DIHYDRATE 80; 4.5 UG/1; UG/1
2 AEROSOL RESPIRATORY (INHALATION) 2 TIMES DAILY
Qty: 30.6 G | Refills: 3 | Status: SHIPPED | OUTPATIENT
Start: 2025-01-27

## 2025-01-27 RX ORDER — GUSELKUMAB 100 MG/ML
INJECTION SUBCUTANEOUS
COMMUNITY
Start: 2025-01-16

## 2025-01-27 RX ORDER — ALBUTEROL SULFATE 90 MCG
2 HFA AEROSOL WITH ADAPTER (GRAM) INHALATION EVERY 4 HOURS PRN
Qty: 20.1 G | Refills: 5 | Status: SHIPPED | OUTPATIENT
Start: 2025-01-27

## 2025-01-27 NOTE — ASSESSMENT & PLAN NOTE
Orders:    Proventil  (90 Base) MCG/ACT inhaler; Inhale 2 puffs every 4 (four) hours as needed for wheezing

## 2025-01-27 NOTE — ASSESSMENT & PLAN NOTE
Orders:    budesonide-formoterol (SYMBICORT) 80-4.5 MCG/ACT inhaler; Inhale 2 puffs 2 (two) times a day    predniSONE 10 mg tablet; Take 3tabs daily for 3days then 2t daily for 3d then 1t daily for 3d

## 2025-01-27 NOTE — PROGRESS NOTES
Name: Екатерина Camacho      : 1970      MRN: [id number]  Encounter Provider: THEO Lewis  Encounter Date: 2025   Encounter department: [facility]    Assessment & Plan  Hypothyroidism due to Hashimoto's thyroiditis  Stable  Cont meds  Labs    Orders:    TSH, 3rd generation with Free T4 reflex; Future    Mild intermittent asthma with acute exacerbation    Orders:    budesonide-formoterol (SYMBICORT) 80-4.5 MCG/ACT inhaler; Inhale 2 puffs 2 (two) times a day    predniSONE 10 mg tablet; Take 3tabs daily for 3days then 2t daily for 3d then 1t daily for 3d    Fatty liver  Cont f/u with GI   Low fat diet           Class 3 severe obesity due to excess calories with serious comorbidity and body mass index (BMI) of 50.0 to 59.9 in adult (HCC)  Prior Authorization Clinical Questions for Weight Management Pharmacotherapy    1. Does the patient have a contrainidcation to medication prescribed for weight management?: No  2. Does the patient have a diagnosis of obesity, confirmed by a BMI greater than or equal to 30 kg/m^2?: Yes  3. Does the patient have a BMI of greater than or equal to 27 kg/m^2 with at least one weight-related comorbidity/risk factor/complication (e.g. diabetes, dyslipidemia, coronary artery disease)?: Yes  4. Weight-related co-morbidities/risk factors: dyslipidemia, GERD, asthma/reactive airway disease  5. Has the patient been on a weight loss regimen of low-calorie diet, increased physical activity, and lifestyle modifications for a minimum of 6 months?: Yes  6. Has the patient completed a comprehensive weight loss program (ie, Weight Watchers, Noom, Bariatrics, other marvin on phone)? If so, what?: Yes   -- Q6 Further Explanation: weight watcher, noon, intermittent fasting   7. Does the patient have a history of type 2 diabetes?: No  8. Has the member tried and failed other weight loss medication within the past 12 months?: Yes   -- Q8 Further explanation: phentermine   9.  Will the member use requested medication in combination with another GLP agonist or weight loss drug?: No  10. Is the medication a controlled substance?: Yes  11. If controlled substance, has the PDMP been checked and verified?: Yes     Baseline weight (in pounds): 291 lbs              Mild intermittent asthma, unspecified whether complicated    Orders:    Proventil  (90 Base) MCG/ACT inhaler; Inhale 2 puffs every 4 (four) hours as needed for wheezing    Mild intermittent asthma without complication  Restart inhalers   Orders:    albuterol (2.5 mg/3 mL) 0.083 % nebulizer solution; Take 3 mL (2.5 mg total) by nebulization every 4 (four) hours as needed for wheezing or shortness of breath    Encounter for screening mammogram for breast cancer    Orders:    Mammo screening bilateral w 3d and cad; Future         History of Present Illness     Here for f/u to chronic medical conditions  Feels a cold starting  Cough and sob  No fever    Tried and failed 6 months of a formal weight loss program weight watcher and noom  Tried phentermine  Tried 150 min exercise each week for at least 3 months  Still unable to lose more than 10 pounds    Does not eat a lot per patient  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   HENT:  Positive for congestion, postnasal drip, rhinorrhea and sore throat. Negative for ear pain.    Respiratory:  Positive for cough and wheezing.    Cardiovascular:  Negative for chest pain and palpitations.   Gastrointestinal:  Negative for constipation, diarrhea and vomiting.   Genitourinary:  Negative for dysuria and frequency.   Musculoskeletal:  Negative for arthralgias and myalgias.   Skin:  Positive for rash.   Neurological:  Negative for dizziness, light-headedness and headaches.   Hematological:  Negative for adenopathy.  "  Psychiatric/Behavioral:  Negative for dysphoric mood and suicidal ideas. The patient is not nervous/anxious.      Past Medical History:   Diagnosis Date    Acute pain of right shoulder     [id number] RESOLVED//seen by pain management    Allergic     Anesthesia     \"sometimes wakes up before procedure done ie, with MRI and EGD\"    Anxiety     [id number]  LAST ASSESSED    Arthritis     left knee    Asthma     allergy induced    Bulging of intervertebral disc between L4 and L5     Bursitis of right knee     better now    Depression     Ear infection     left PCP placed pt on antibiotic and ear drops    Elevated AST (SGOT)     [id number] RESOLVED    GERD (gastroesophageal reflux disease)     Goiter     [id number] RESOLVED//pt denies states has \"inflamed thyroid\"    Hiatal hernia     Hypothyroidism     [id number] RESOLVED//not on meds    Lipoma     left scapular area    Lump of axilla, right     [id number] RESOLVED marker clip in rt breast    Obstructive sleep apnea     [id number] RESOLVED    Psoriasis     Rash     under bilat breasts and folds of abdomen,, pt reports Dr Haverrodrigo aware and dermatologist is treating/pt using ketoconazole cream as directed    Shortness of breath     \"with exertion\"    Use of cane as ambulatory aid     occas     Past Surgical History:   Procedure Laterality Date    CARPAL TUNNEL RELEASE Bilateral     COLONOSCOPY      EGD      HYSTERECTOMY  12/30/2010    WITH ENDOMETERIAL ABLATION    KNEE SURGERY Left     NERVE BLOCK      PERIPH NERVE BLOCK WRIST MEDIAN W/LOCAL STEROID INFILTRAIT    PLANTAR FASCIA RELEASE Bilateral 2009, 2020 R    June 2020 on R    HI COLONOSCOPY FLX DX W/COLLJ SPEC WHEN PFRMD N/A 08/02/2018    Procedure: EGD AND COLONOSCOPY;  Surgeon: Mulugeta Gill MD;  [facility];  Service: Gastroenterology    HI EXC B9 LESION MRGN XCP SK TG T/A/L 0.5 CM/< N/A 3/2/2023    Procedure: EXCISION WIDE LESION LEFT UPPER BACK;  Surgeon: Maya Chaevz MD;  [facility];  Service: " General    [place] FASCIECTOMY PLANTAR FASCIA PARTIAL SPX Right 06/09/2020    Procedure: RELEASE FASCIA PLANTAR/FASCIOTOMY ENDOSCOPIC (EPF);  Surgeon: Viraj Harkins DPM;  [facility];  Service: Podiatry    CA RELEASE TARSAL TUNNEL Right 08/11/2020    Procedure: RELEASE TARSAL TUNNEL;  Surgeon: Viraj Harkins DPM;  [facility];  Service: Podiatry    TONSILLECTOMY AND ADENOIDECTOMY       Family History   Problem Relation Age of Onset    Cirrhosis Mother     Hypertension Father     Nephrolithiasis Sister     Fibroids Sister     Fibroids Sister     Fibroids Sister     Anxiety disorder Daughter     Asthma Daughter     Ulcerative colitis Son     Heart disease Maternal Grandfather     Ovarian cancer Paternal Grandmother     Colon cancer Paternal Grandmother     Uterine cancer Maternal Aunt     Obesity Family      Social History     Tobacco Use    Smoking status: Never     Passive exposure: Never    Smokeless tobacco: Never   Vaping Use    Vaping status: Never Used   Substance and Sexual Activity    Alcohol use: Yes     Comment: very rare (1 drink once per month - if that)    Drug use: No    Sexual activity: Not on file     Current Outpatient Medications on File Prior to Visit   Medication Sig    Guselkumab (Tremfya) 100 MG/ML SOAJ Inject under the skin    levothyroxine (Synthroid) 175 mcg tablet Take 1 tablet (175 mcg total) by mouth daily    montelukast (SINGULAIR) 10 mg tablet TAKE 1 TABLET BY MOUTH DAILY AT BEDTIME    pantoprazole (PROTONIX) 40 mg tablet Take 1 tablet (40 mg total) by mouth 2 (two) times a day    phentermine (ADIPEX-P) 37.5 MG tablet Take 1 tablet (37.5 mg total) by mouth in the morning    valACYclovir (VALTREX) 500 mg tablet Take 1 tablet (500 mg total) by mouth daily    [DISCONTINUED] albuterol (2.5 mg/3 mL) 0.083 % nebulizer solution Take 3 mL (2.5 mg total) by nebulization every 4 (four) hours as needed for wheezing or shortness of breath    [DISCONTINUED] budesonide-formoterol  "(SYMBICORT) 80-4.5 MCG/ACT inhaler Inhale 2 puffs 2 (two) times a day    [DISCONTINUED] Proventil  (90 Base) MCG/ACT inhaler Inhale 2 puffs every 4 (four) hours as needed for wheezing     Allergies   Allergen Reactions    Meloxicam GI Bleeding    Chlorhexidine Rash     Immunization History   Administered Date(s) Administered    COVID-19 J&J ([email or web address]) vaccine 0.5 mL 03/19/2021    INFLUENZA 12/22/2015, 10/17/2016, 12/04/2017, 11/12/2018    Influenza Quadrivalent Preservative Free 3 years and older IM 12/31/2014, 10/17/2016, 12/04/2017    Influenza Quadrivalent, 6-35 Months IM 12/30/2014, 12/22/2015    Influenza Recombinant Preservative Free Im 10/14/2024    Influenza, injectable, quadrivalent, preservative free 0.5 mL 10/31/2019    Influenza, recombinant, quadrivalent,injectable, preservative free 11/12/2018, 12/08/2020, 12/09/2021, 11/16/2022    Influenza, seasonal, injectable 10/20/2012, 12/08/2021, 12/08/2021    Tdap 08/30/2012     Objective   /80 (BP Location: Left arm, Patient Position: Sitting, Cuff Size: Large)   Pulse (!) 118   Temp (!) 97 °F (36.1 °C) (Tympanic)   Resp 17   Ht 5' 3\" (1.6 m)   Wt 132 kg (291 lb)   SpO2 97%   BMI 51.55 kg/m²     Physical Exam  Vitals and nursing note reviewed.   Constitutional:       Appearance: Normal appearance.   HENT:      Head: Normocephalic and atraumatic.      Right Ear: Tympanic membrane, ear canal and external ear normal.      Left Ear: Tympanic membrane, ear canal and external ear normal.      Nose: No congestion or rhinorrhea.      Mouth/Throat:      Mouth: Mucous membranes are moist.      Pharynx: No posterior oropharyngeal erythema.   Eyes:      Conjunctiva/sclera: Conjunctivae normal.   Cardiovascular:      Rate and Rhythm: Normal rate and regular rhythm.      Heart sounds: Normal heart sounds.   Pulmonary:      Effort: Pulmonary effort is normal.      Breath sounds: Normal breath sounds.   Abdominal:      General: Bowel sounds are normal.      " Palpations: Abdomen is soft.   Musculoskeletal:         General: Normal range of motion.      Cervical back: Normal range of motion and neck supple.   Skin:     General: Skin is warm and dry.      Capillary Refill: Capillary refill takes less than 2 seconds.   Neurological:      General: No focal deficit present.      Mental Status: She is alert and oriented to person, place, and time.   Psychiatric:         Mood and Affect: Mood normal.         Behavior: Behavior normal.         Thought Content: Thought content normal.         Judgment: Judgment normal.

## 2025-01-27 NOTE — ASSESSMENT & PLAN NOTE
Restart inhalers   Orders:    albuterol (2.5 mg/3 mL) 0.083 % nebulizer solution; Take 3 mL (2.5 mg total) by nebulization every 4 (four) hours as needed for wheezing or shortness of breath

## 2025-01-27 NOTE — TELEPHONE ENCOUNTER
REceived call from [facility] post receipt of order for Proventil  (90 Base) MCG/ACT inhaler  to make provider aware brand is not available. Is it alright o substitute for generic. Please follow up with pharmacy for provider response.

## 2025-01-27 NOTE — ASSESSMENT & PLAN NOTE
Prior Authorization Clinical Questions for Weight Management Pharmacotherapy    1. Does the patient have a contrainidcation to medication prescribed for weight management?: No  2. Does the patient have a diagnosis of obesity, confirmed by a BMI greater than or equal to 30 kg/m^2?: Yes  3. Does the patient have a BMI of greater than or equal to 27 kg/m^2 with at least one weight-related comorbidity/risk factor/complication (e.g. diabetes, dyslipidemia, coronary artery disease)?: Yes  4. Weight-related co-morbidities/risk factors: dyslipidemia, GERD, asthma/reactive airway disease  5. Has the patient been on a weight loss regimen of low-calorie diet, increased physical activity, and lifestyle modifications for a minimum of 6 months?: Yes  6. Has the patient completed a comprehensive weight loss program (ie, Weight Watchers, Noom, Bariatrics, other marvin on phone)? If so, what?: Yes   -- Q6 Further Explanation: weight watcher, noon, intermittent fasting   7. Does the patient have a history of type 2 diabetes?: No  8. Has the member tried and failed other weight loss medication within the past 12 months?: Yes   -- Q8 Further explanation: phentermine   9. Will the member use requested medication in combination with another GLP agonist or weight loss drug?: No  10. Is the medication a controlled substance?: Yes  11. If controlled substance, has the PDMP been checked and verified?: Yes     Baseline weight (in pounds): 291 lbs

## 2025-01-29 NOTE — TELEPHONE ENCOUNTER
Use [phone] to call back [facility] with an update.  Reference # [id number]    bleeding, spotting

## 2025-01-29 NOTE — TELEPHONE ENCOUNTER
Sandra called back to check on this. Please advise. Advised you were not in til this afternoon      [phone]

## 2025-02-06 ENCOUNTER — TELEPHONE (OUTPATIENT)
Age: 55
End: 2025-02-06

## 2025-02-06 ENCOUNTER — PATIENT MESSAGE (OUTPATIENT)
Age: 55
End: 2025-02-06

## 2025-02-06 NOTE — TELEPHONE ENCOUNTER
Spoke with pt, she has been out of pantoprazole for a week, she started taking omeprazole 20 mg once daily-otc, until script is approved from insurance. Sent request to prior auth team for pantoprazole.

## 2025-02-06 NOTE — TELEPHONE ENCOUNTER
Patients GI provider:  MIKA MENDOZA    Number to return call: [phone]    Reason for call: Pt calling stating she requires prior authorization for pantoprazole. Please initiate prior authorization and contact patient with update.    =

## 2025-02-06 NOTE — TELEPHONE ENCOUNTER
Patients GI provider:  MIKA MENDOZA    Number to return call: [phone]    Reason for call: Pt calling stating she is completely out of pantoprazole medication. There has been a hold up at the pharmacy due to insurance. Patient is questioning if there is anything she can do until medication issue with insurance is resolved.    Please return patients call to further discuss.

## 2025-02-06 NOTE — TELEPHONE ENCOUNTER
PA for pantoprazole 40 mg BID    SUBMITTED to [employer]    via    [x]CMM-KEY: [id number]    All office notes, labs and other pertaining documents and studies sent. Clinical questions answered. Awaiting determination from insurance company.     Turnaround time for your insurance to make a decision on your Prior Authorization can take 7-21 business days.

## 2025-02-06 NOTE — TELEPHONE ENCOUNTER
There are no pharmacy insurance benefits listed in pt's chart.     Sent a message to pt via [employer]

## 2025-02-07 ENCOUNTER — PATIENT MESSAGE (OUTPATIENT)
Age: 55
End: 2025-02-07

## 2025-02-07 DIAGNOSIS — K21.9 GASTROESOPHAGEAL REFLUX DISEASE WITHOUT ESOPHAGITIS: ICD-10-CM

## 2025-02-07 RX ORDER — PANTOPRAZOLE SODIUM 40 MG/1
40 TABLET, DELAYED RELEASE ORAL 2 TIMES DAILY
Qty: 180 TABLET | Refills: 1 | Status: SHIPPED | OUTPATIENT
Start: 2025-02-07

## 2025-02-07 NOTE — TELEPHONE ENCOUNTER
Brittany from "[employer] calling asking if prior auth has received paperwork to be filled out, advised nothing was fund in chart , they originally sent to fax [phone], gave them the central fax number [phone] to re-fax, please be on look out for forms from "[employer] Insurance

## 2025-02-07 NOTE — TELEPHONE ENCOUNTER
Rep from Sandra calling to speak with someone from hep about info missing on the fax we sent yesterday and today for this Pt's PA, she says we are missing info needed to determine approval and its a high priority matter. I asked her to hold so I could get some one for her to speak with, she agreed but then disconnect from hold before I could reach out to [facility] for assistance. I was unable to get her name as she was speaking very quickly and kept talking and repeating herself so I would understand her urgency, the number she called from was [phone]

## 2025-02-07 NOTE — TELEPHONE ENCOUNTER
Please see other encounter, a pa was submitted through [facility].  Yesterday.  Waiting for determination.  Thank you

## 2025-02-10 NOTE — TELEPHONE ENCOUNTER
PA for pantorpazole APPROVED     Date(s) approved 2/8/2026        Patient advised by          []MyChart Message  []Phone call   [x]LMOM  []L/M to call office as no active Communication consent on file  []Unable to leave detailed message as VM not approved on Communication consent       Pharmacy advised by    [x]Fax  []Phone call    Approval letter scanned into [employer] Yes

## 2025-02-11 NOTE — TELEPHONE ENCOUNTER
Sandra calling to inquire if appeal form has been received for the prior authorization for Pantoprazole. Informed that form was received, completed and faxed back. Also that approval was already received for authorization. Sandra will look further into their system.

## 2025-02-17 ENCOUNTER — OFFICE VISIT (OUTPATIENT)
Dept: URGENT CARE | Age: 55
End: 2025-02-17
Payer: COMMERCIAL

## 2025-02-17 ENCOUNTER — TELEPHONE (OUTPATIENT)
Age: 55
End: 2025-02-17

## 2025-02-17 VITALS
BODY MASS INDEX: 51.91 KG/M2 | TEMPERATURE: 97.1 F | WEIGHT: 293 LBS | OXYGEN SATURATION: 97 % | DIASTOLIC BLOOD PRESSURE: 78 MMHG | SYSTOLIC BLOOD PRESSURE: 146 MMHG | RESPIRATION RATE: 20 BRPM | HEIGHT: 63 IN | HEART RATE: 90 BPM

## 2025-02-17 DIAGNOSIS — M67.441 GANGLION CYST OF FINGER OF RIGHT HAND: Primary | ICD-10-CM

## 2025-02-17 PROCEDURE — S9083 URGENT CARE CENTER GLOBAL: HCPCS | Performed by: PHYSICIAN ASSISTANT

## 2025-02-17 PROCEDURE — G0382 LEV 3 HOSP TYPE B ED VISIT: HCPCS | Performed by: PHYSICIAN ASSISTANT

## 2025-02-17 NOTE — TELEPHONE ENCOUNTER
Spoke with patient and will go to UC today due to work schedule.  Not able to come in tomorrow to evaluate.

## 2025-02-17 NOTE — TELEPHONE ENCOUNTER
Patient calling stating she has a mucus cyst on her thumb.  Spoke with office and they do not have any availably today.  Patient wanted to know if she could get an xray order and will be sending a picture through [employer].    She was told to go to Urgent Care or make an apt for tomorrow morning.  She would like to get xray.      Please call patient back or send message through [employer].  Thank you

## 2025-02-17 NOTE — PATIENT INSTRUCTIONS
Ace wrap compression as needed.  Tylenol as needed for pain due to history of gastric bleeding.  May try ice 20 minutes on 20 minutes off as needed.  Follow-up with Dr. Atkins referral provided today.  If symptoms worsen or new symptoms develop report to the emergency room.

## 2025-02-17 NOTE — PROGRESS NOTES
[facility]        NAME: Екатерина Camacho is a 54 y.o. female  : 1970    MRN: [id number]  DATE: 2025  TIME: 12:54 PM    Assessment and Plan   Ganglion cyst of finger of right hand [M67.441]  1. Ganglion cyst of finger of right hand  Ambulatory Referral to Orthopedic Surgery      Patient presents with ganglion cyst over the IP joint of the right thumb.  Patient is placed in an Ace wrap.  We discussed over-the-counter Tylenol as she has a history of GI bleed.  Referred to orthopedics for definitive treatment and management.    Medical Decision Making     PROBLEM: 1 acute, uncomplicated illness or injury    DATA: None     RISK: Over-the-counter medication(s)    TIME: 15 minutes      Patient Instructions     Patient Instructions   Ace wrap compression as needed.  Tylenol as needed for pain due to history of gastric bleeding.  May try ice 20 minutes on 20 minutes off as needed.  Follow-up with Dr. Atkins referral provided today.  If symptoms worsen or new symptoms develop report to the emergency room.        Follow up with PCP in 3-5 days.  Proceed to  ER if symptoms worsen.    If tests have been performed at [facility], our office will contact you with results if changes need to be made to the care plan discussed with you at the visit. You can review your full results on [facility] MyChart.     Chief Complaint     Chief Complaint   Patient presents with    Hand Pain     Started yesterday. Pain coming from base of thumb and up to the tip. Believes she ruptured something in her thumb. Worries about it because it is her dominant hand. Believes when she took her thumb ring off it caused it to swell.          History of Present Illness       Female presents with complaint of pain to her.  Patient notes that she was trying to put on her thumb ring yesterday and developed pain at that time.  She states when she removed that she noticed swelling over with her thumb joint and has a lot of pain to that  area.  She notes some numbness and tingling associated.    Hand Pain         Review of Systems   Review of Systems   Musculoskeletal:  Positive for arthralgias and joint swelling.         Current Medications       Current Outpatient Medications:     albuterol (2.5 mg/3 mL) 0.083 % nebulizer solution, Take 3 mL (2.5 mg total) by nebulization every 4 (four) hours as needed for wheezing or shortness of breath, Disp: 75 mL, Rfl: 1    budesonide-formoterol (SYMBICORT) 80-4.5 MCG/ACT inhaler, Inhale 2 puffs 2 (two) times a day, Disp: 30.6 g, Rfl: 3    Guselkumab (Tremfya) 100 MG/ML SOAJ, Inject under the skin, Disp: , Rfl:     levothyroxine (Synthroid) 175 mcg tablet, Take 1 tablet (175 mcg total) by mouth daily, Disp: 90 tablet, Rfl: 1    montelukast (SINGULAIR) 10 mg tablet, TAKE 1 TABLET BY MOUTH DAILY AT BEDTIME, Disp: 90 tablet, Rfl: 1    pantoprazole (PROTONIX) 40 mg tablet, Take 1 tablet (40 mg total) by mouth 2 (two) times a day, Disp: 180 tablet, Rfl: 1    phentermine (ADIPEX-P) 37.5 MG tablet, Take 1 tablet (37.5 mg total) by mouth in the morning, Disp: 30 tablet, Rfl: 2    predniSONE 10 mg tablet, Take 3tabs daily for 3days then 2t daily for 3d then 1t daily for 3d, Disp: 18 tablet, Rfl: 0    Proventil  (90 Base) MCG/ACT inhaler, Inhale 2 puffs every 4 (four) hours as needed for wheezing, Disp: 20.1 g, Rfl: 5    valACYclovir (VALTREX) 500 mg tablet, Take 1 tablet (500 mg total) by mouth daily, Disp: 90 tablet, Rfl: 1    Current Allergies     Allergies as of 02/17/2025 - Reviewed 02/17/2025   Allergen Reaction Noted    Meloxicam GI Bleeding 10/14/2024    Chlorhexidine Rash 03/16/2023            The following portions of the patient's history were reviewed and updated as appropriate: allergies, current medications, past family history, past medical history, past social history, past surgical history and problem list.     Past Medical History:   Diagnosis Date    Acute pain of right shoulder     [id number]  "RESOLVED//seen by pain management    Allergic     Anesthesia     \"sometimes wakes up before procedure done ie, with MRI and EGD\"    Anxiety     [id number]  LAST ASSESSED    Arthritis     left knee    Asthma     allergy induced    Bulging of intervertebral disc between L4 and L5     Bursitis of right knee     better now    Depression     Ear infection     left PCP placed pt on antibiotic and ear drops    Elevated AST (SGOT)     [id number] RESOLVED    GERD (gastroesophageal reflux disease)     Goiter     [id number] RESOLVED//pt denies states has \"inflamed thyroid\"    Hiatal hernia     Hypothyroidism     [id number] RESOLVED//not on meds    Lipoma     left scapular area    Lump of axilla, right     [id number] RESOLVED marker clip in rt breast    Obstructive sleep apnea     [id number] RESOLVED    Psoriasis     Rash     under bilat breasts and folds of abdomen,, pt reports Dr Harkins aware and dermatologist is treating/pt using ketoconazole cream as directed    Shortness of breath     \"with exertion\"    Use of cane as ambulatory aid     occas       Past Surgical History:   Procedure Laterality Date    CARPAL TUNNEL RELEASE Bilateral     COLONOSCOPY      EGD      HYSTERECTOMY  12/30/2010    WITH ENDOMETERIAL ABLATION    KNEE SURGERY Left     NERVE BLOCK      PERIPH NERVE BLOCK WRIST MEDIAN W/LOCAL STEROID INFILTRAIT    PLANTAR FASCIA RELEASE Bilateral 2009, 2020 R    June 2020 on R    MI COLONOSCOPY FLX DX W/COLLJ SPEC WHEN PFRMD N/A 08/02/2018    Procedure: EGD AND COLONOSCOPY;  Surgeon: Mulugeta Gill MD;  Location: [facility];  Service: Gastroenterology    MI EXC B9 LESION MRGN XCP SK TG T/A/L 0.5 CM/< N/A 3/2/2023    Procedure: EXCISION WIDE LESION LEFT UPPER BACK;  Surgeon: Maya Chavez MD;  Location: [facility];  Service: General    MI FASCIECTOMY PLANTAR FASCIA PARTIAL SPX Right 06/09/2020    Procedure: RELEASE FASCIA PLANTAR/FASCIOTOMY ENDOSCOPIC (EPF);  Surgeon: Viraj Harkins DPM;  Location: [facility];  Service: " "Podiatry    CA RELEASE TARSAL TUNNEL Right 08/11/2020    Procedure: RELEASE TARSAL TUNNEL;  Surgeon: Viraj Harkins DPM;  Location: [facility];  Service: Podiatry    TONSILLECTOMY AND ADENOIDECTOMY         Family History   Problem Relation Age of Onset    Cirrhosis Mother     Hypertension Father     Nephrolithiasis Sister     Fibroids Sister     Fibroids Sister     Fibroids Sister     Anxiety disorder Daughter     Asthma Daughter     Ulcerative colitis Son     Heart disease Maternal Grandfather     Ovarian cancer Paternal Grandmother     Colon cancer Paternal Grandmother     Uterine cancer Maternal Aunt     Obesity Family          Medications have been verified.        Objective   /78   Pulse 90   Temp (!) 97.1 °F (36.2 °C) (Tympanic)   Resp 20   Ht 5' 3\" (1.6 m)   Wt (!) 137 kg (301 lb 9.6 oz)   SpO2 97%   BMI 53.43 kg/m²   No LMP recorded. Patient has had a hysterectomy.       Physical Exam     Physical Exam  Vitals and nursing note reviewed.   Constitutional:       General: She is awake. She is not in acute distress.     Appearance: Normal appearance. She is well-developed and well-groomed. She is not ill-appearing, toxic-appearing or diaphoretic.   HENT:      Head: Normocephalic and atraumatic.      Right Ear: Hearing and external ear normal.      Left Ear: Hearing and external ear normal.   Eyes:      General: Lids are normal. Vision grossly intact. Gaze aligned appropriately.   Cardiovascular:      Rate and Rhythm: Normal rate.   Pulmonary:      Effort: Pulmonary effort is normal.      Comments: Patient is speaking in full sentences with no increased respiratory effort. No audible wheezing or stridor.   Musculoskeletal:      Cervical back: Normal range of motion.      Comments: Is clean dry intact.  There is a firm immobile mass noted to the radial aspect of the right thumb.  There is tenderness to this region as well it is located over the dorsal IP joint.  Patient has grossly intact sensation " "capillary refill is less than 2 seconds.  Insistent with a mucoid or ganglion cyst.   Skin:     General: Skin is warm and dry.   Neurological:      Mental Status: She is alert and oriented to person, place, and time.      Coordination: Coordination is intact.      Gait: Gait is intact.   Psychiatric:         Attention and Perception: Attention and perception normal.         Mood and Affect: Mood and affect normal.         Speech: Speech normal.         Behavior: Behavior normal. Behavior is cooperative.               Note: Portions of this record may have been created with voice recognition software. Occasional wrong word or \"sound a like\" substitutions may have occurred due to the inherent limitations of voice recognition software. Please read the chart carefully and recognize, using context, where substitutions have occurred.*      "

## 2025-02-26 ENCOUNTER — APPOINTMENT (OUTPATIENT)
Dept: RADIOLOGY | Facility: AMBULARY SURGERY CENTER | Age: 55
End: 2025-02-26
Attending: SURGERY
Payer: COMMERCIAL

## 2025-02-26 ENCOUNTER — OFFICE VISIT (OUTPATIENT)
Dept: OBGYN CLINIC | Facility: CLINIC | Age: 55
End: 2025-02-26
Payer: COMMERCIAL

## 2025-02-26 VITALS — HEIGHT: 63 IN | WEIGHT: 293 LBS | BODY MASS INDEX: 51.91 KG/M2

## 2025-02-26 DIAGNOSIS — M65.331 TRIGGER FINGER, RIGHT MIDDLE FINGER: ICD-10-CM

## 2025-02-26 DIAGNOSIS — M67.40 MUCOID CYST OF JOINT: ICD-10-CM

## 2025-02-26 DIAGNOSIS — M65.311 TRIGGER THUMB OF RIGHT HAND: ICD-10-CM

## 2025-02-26 DIAGNOSIS — M67.40 GANGLION CYST: Primary | ICD-10-CM

## 2025-02-26 DIAGNOSIS — M67.40 GANGLION CYST: ICD-10-CM

## 2025-02-26 DIAGNOSIS — Z01.812 PRE-PROCEDURE LAB EXAM: ICD-10-CM

## 2025-02-26 PROCEDURE — 73130 X-RAY EXAM OF HAND: CPT

## 2025-02-26 PROCEDURE — 99204 OFFICE O/P NEW MOD 45 MIN: CPT | Performed by: SURGERY

## 2025-02-26 RX ORDER — SODIUM CHLORIDE 9 MG/ML
75 INJECTION, SOLUTION INTRAVENOUS ONCE
OUTPATIENT
Start: 2025-02-26

## 2025-02-26 NOTE — PROGRESS NOTES
Baljit Abdi M.D.  Attending, Orthopaedic Surgery  Hand, Wrist, and Elbow Surgery  [facility]      ORTHOPAEDIC HAND, WRIST, AND ELBOW OFFICE  VISIT       ASSESSMENT/PLAN:        Assessment & Plan  Ganglion cyst  The etiology of a retinacular cyst was discussed along with treatment options   Right long finger mucoid cyst excision and trigger finger release was discussed at length including risks and benefits   We discussed a trigger finger release at the time of the retinacular cyst excision due to location of the cyst.  Common risks include bleeding, infection, injury to nearby structures (vessels, nerves, tendons, ligaments), blood clots (deep vein thrombosis / pulmonary embolus), and wound healing problems. Infection may result in an infection of the blood stream and/or the need for oral or intravenous antibiotics. Additional risks include impaired limb function, loss of limb and/or failure to achieve desired results. With tendon work, there is a risk of re-rupture of tendon, and inability to repair tendon. There is an increased risk in obese patients and people who smoke of wound infection, heart and lung complications and blood clots (thrombosis). There is a risk for unacceptable cosmetic results (such as scarring). Surgery of the hand is associated with bleeding, infection, scar, pain, wound healing problems, damage to nerves, arteries, tendons, ligaments, failure to give desired result, instability, nonunion, malunion hardware issues, and need for more surgery.  She elected to proceed with surgical intervention and informed electronic surgical consent was signed for right thumb mucoid cyst excision, arthrotomy, synovectomy and ostectomy, right long and thumb trigger finger release and reticular cyst excision of the right long finger.   Post operative instructions/expectations were reviewed and provided in AVS.   Surgery will ne planned for May.     Orders:    XR hand 3+ vw right;  Future    Basic metabolic panel; Future    EKG 12 lead; Future    Mucoid cyst of joint  X-rays were performed in the office and reviewed.   The etiology of a mucoid cyst was discussed along with treatment options.   We discussed in office aspiration vs surgical excision with IP joint arthrotomy, synovectomy and ostectomy. We discussed with surgical intervention it is possible the cyst will reoccur as this is secondary to underling arthritis.   Common risks include bleeding, infection, injury to nearby structures (vessels, nerves, tendons, ligaments), blood clots (deep vein thrombosis / pulmonary embolus), and wound healing problems. Infection may result in an infection of the blood stream and/or the need for oral or intravenous antibiotics. Additional risks include impaired limb function, loss of limb and/or failure to achieve desired results. With tendon work, there is a risk of re-rupture of tendon, and inability to repair tendon. With joint work, there is a risk for post-traumatic arthritis. There is an increased risk in obese patients and people who smoke of wound infection, heart and lung complications and blood clots (thrombosis). There is a risk for unacceptable cosmetic results (such as scarring). Surgery of the hand is associated with bleeding, infection, scar, pain, wound healing problems, damage to nerves, arteries, tendons, ligaments, failure to give desired result, instability, nonunion, malunion hardware issues, and need for more surgery.  She elected to proceed with surgical intervention and informed electronic surgical consent was signed for right thumb mucoid cyst excision, arthrotomy, synovectomy and ostectomy, right long and thumb trigger finger release and reticular cyst excision of the right long finger.   Post operative instructions/expectations were reviewed and provided in AVS.   Surgery will ne planned for May.   Orders:    Ambulatory Referral to Orthopedic Surgery    Basic metabolic panel;  Future    EKG 12 lead; Future    Trigger finger, right middle finger    Orders:    Case request operating room: EXCISION MUCOID CYST, IP ARTHROTOMY SYNOVECTOMY, OSTECTOMY, RELEASE TRIGGER FINGER; Standing    Basic metabolic panel; Future    EKG 12 lead; Future    Trigger thumb of right hand  The etiology of a trigger finger was discussed along with treatment options.   We discussed a trigger finger release vs trigger finger CSI.   Risks and benefits were discussed at length.   Common risks include bleeding, infection, injury to nearby structures (vessels, nerves, tendons, ligaments), blood clots (deep vein thrombosis / pulmonary embolus), and wound healing problems. Infection may result in an infection of the blood stream and/or the need for oral or intravenous antibiotics. Additional risks include impaired limb function, loss of limb and/or failure to achieve desired results. With tendon work, there is a risk of re-rupture of tendon, and inability to repair tendon. There is an increased risk in obese patients and people who smoke of wound infection, heart and lung complications and blood clots (thrombosis). There is a risk for unacceptable cosmetic results (such as scarring). Surgery of the hand is associated with bleeding, infection, scar, pain, wound healing problems, damage to nerves, arteries, tendons, ligaments, failure to give desired result, instability, nonunion, malunion hardware issues, and need for more surgery.  She elected to proceed with surgical intervention and informed electronic surgical consent was signed for right thumb mucoid cyst excision, arthrotomy, synovectomy and ostectomy, right long and thumb trigger finger release and reticular cyst excision of the right long finger.   Post operative instructions/expectations were reviewed and provided in AVS.   Surgery will ne planned for May.   Orders:    Case request operating room: EXCISION MUCOID CYST, IP ARTHROTOMY SYNOVECTOMY, OSTECTOMY, RELEASE  TRIGGER FINGER; Standing    Basic metabolic panel; Future    EKG 12 lead; Future    Pre-procedure lab exam    Orders:    Basic metabolic panel; Future    EKG 12 lead; Future      The patient verbalized understanding of exam findings and treatment plan. We engaged in the shared decision-making process and treatment options were discussed at length with the patient. Surgical and conservative management discussed today along with risks and benefits.    Diagnoses and all orders for this visit:    Ganglion cyst  -     XR hand 3+ vw right; Future  -     Basic metabolic panel; Future  -     EKG 12 lead; Future    Mucoid cyst of joint  -     Ambulatory Referral to Orthopedic Surgery  -     Basic metabolic panel; Future  -     EKG 12 lead; Future    Trigger finger, right middle finger  -     Case request operating room: EXCISION MUCOID CYST, IP ARTHROTOMY SYNOVECTOMY, OSTECTOMY, RELEASE TRIGGER FINGER; Standing  -     Basic metabolic panel; Future  -     EKG 12 lead; Future  -     Case request operating room: EXCISION MUCOID CYST, IP ARTHROTOMY SYNOVECTOMY, OSTECTOMY, RELEASE TRIGGER FINGER    Trigger thumb of right hand  -     Case request operating room: EXCISION MUCOID CYST, IP ARTHROTOMY SYNOVECTOMY, OSTECTOMY, RELEASE TRIGGER FINGER; Standing  -     Basic metabolic panel; Future  -     EKG 12 lead; Future  -     Case request operating room: EXCISION MUCOID CYST, IP ARTHROTOMY SYNOVECTOMY, OSTECTOMY, RELEASE TRIGGER FINGER    Pre-procedure lab exam  -     Basic metabolic panel; Future  -     EKG 12 lead; Future    Other orders  -     Nursing Communication Warmimg Interventions Implemented; Standing  -     Nursing Communication CHG bath, have staff wash entire body (neck down) per pre-op bathing protocol. Routine, evening prior to, and day of surgery.; Standing  -     Nursing Communication Swab both nares with Povidone-Iodine solution, EXCLUDE if patient has shellfish/Iodine allergy, and replace with nasal alcohol  swabstick. Routine, day of surgery, on call to OR; Standing  -     Void on call to OR; Standing  -     Insert peripheral IV; Standing  -     Diet NPO; Sips with meds; Standing  -     sodium chloride 0.9 % infusion  -     ceFAZolin (ANCEF) 3,000 mg in sodium chloride 0.9 % 50 mL IVPB  -     Apply Sequential Compression Device; Standing      Follow Up:  Return for 10-14 days , post op.    To Do Next Visit:  Re-evaluation of current issue      General Discussions:  CMC Arthritis: The anatomy and physiology of carpometacarpal joint arthritis was discussed with the patient today in the office.  Deterioration of the articular cartilage eventually leads to hypermobility at the thumb CMC joint, resulting in joint subluxation, osteophyte formation, cystic changes within the trapezium and base of the first metacarpal, as well as subchondral sclerosis.  Eventually, pain, limited mobility, and compensatory hyperextension at the metacarpophalangeal joint may develop.  While normal activity and usage of the thumb joint may provide a painful experience to the patient, this typically does not result in damage to the thumb or hand.  Treatment options include resting thumb spica splints to decreased joint edema, pain, and inflammation.  Therapy exercises to strengthen the thenar musculature may relieve pain, but do not alter the overall continued development of osteoarthritis.  Oral medications, topical medications, corticosteroid injections may decrease pain and increase overall function.  Eventually, approximately 5% of patients may require surgical intervention.                                                                                                                                                                                                 Ganglion Cysts: The anatomy and physiology of the ganglion was discussed with the patient today in the office.  Fluid leaking out of the joint surface typically creates a small sac,  which can enlarge and cause pain or limitation of motion.  Treatment options include observation, aspiration, or surgical incision were discussed with the patient today.  Observation typically lead to resolution and approximately 10% of patients, aspiration results in resolution of approximately 50% of patients, and surgical excision leads to resolution in approximately 97% of patients.  After discussion with the patient today, the patient voiced understanding of all treatment options.    Trigger Finger: The anatomy and physiology of trigger finger was discussed with the patient today in the office.  Edema and increased contact pressure within the flexor tendons at the A1 pulley can cause pain, crepitation, and triggering or locking of the digit resulting in limitation of function.  Symptoms can occur at anytime but are typically worse in the morning or after a brief rest from repetitive activity.  Treatment options include resting/nighttime MP blocking splints to decrease edema, oral anti-inflammatory medications, home or formal therapy exercises, up to 2 steroid injections within the tendon sheath, or surgical release.  While majority of patients do respond to conservative treatment, up to 20% may require surgical release.     Operative Discussions:  Ganglion Cyst Excision: The anatomy and physiology of the ganglion was discussed with the patient today in the office.  Fluid leaking out of the joint surface typically creates a small sac, which can enlarge and cause pain or limitation of motion.  Treatment options include observation, aspiration, or surgical incision were discussed with the patient today.  Observation typically lead to resolution and approximately 10% of patients, aspiration least resolution approximately 50% of patients, and surgical excision lead to resolution in approximately 97% of patients.  After discussion with the patient today, the patient voiced understanding of all treatment options.The  patient has elected excision of the ganglion.The risks and benefits of the procedure were explained to the patient, which include, but are not limited to: Bleeding, infection, recurrence, pain, scar, damage to tendons, damage to nerves, and damage to blood vessels, failure to give desired results and complications related to anesthesia.  These risks, along with alternative conservative treatment options, and postoperative protocols were voiced back and understood by the patient.  All questions were answered to the patient's satisfaction.  The patient agrees to comply with a standard postoperative protocol, and is willing to proceed.  Education was provided via written and auditory forms.  There were no barriers to learning. Written handouts regarding wound care, incision and scar care, and general preoperative information was provided to the patient.  Prior to surgery, the patient may be requested to stop all anti-inflammatory medications.  Prophylactic aspirin, Plavix, and Coumadin may be allowed to be continued.  Medications including vitamin E., ginkgo, and fish oil are requested to be stopped approximately one week prior to surgery.  Hypertensive medications and beta blockers, if taken, should be continued. and Trigger Finger Release: The anatomy and physiology of trigger finger was discussed with the patient today in the office.  Edema and increased contact pressure within the flexor tendons at the A1 pulley can cause pain, crepitation, and limitation of function.  Treatment options include resting MP blocking splints to decrease edema, oral anti-inflammatory medications, home or formal therapy exercises, up to 2 steroid injections or surgical release.  While majority of patients do respond to conservative treatment, up to 20% may require surgical release.  The patient has elected release of the trigger finger.  The patient has elected to undergo a release of the A1 pulley (trigger finger).  A small incision  will be made over the palmar aspect of the hand, the tendon sheath holding the flexor tendons will be released.  In the postoperative period, light activities are allowed immediately, driving is allowed when narcotic medication has stopped, and the incision may get wet after 2 days.  Heavy activities (lifting more than approximately 10 pounds) will be allowed after the follow up appointment in 1-2 weeks.  While the pain and discomfort within the wrist typically improves rapidly, some residual discomfort may be present for up to 6 weeks.  The nodule that is typically palpable in the palmar aspect of the hand will not be removed, as this would necessitate removal of a portion of the flexor tendon, however the catching, clicking, and locking should resolve.  Approximate success rate is 98%.The risks and benefits of the procedure were explained to the patient, which include, but are not limited to: Bleeding, infection, recurrence, pain, scar, damage to tendons, damage to nerves, and damage to blood vessels, need for future surgery and complications related to anesthesia.  If bony work is done, risks also include malunion and nonunion.  These risks, along with alternative conservative treatment options, and postoperative protocols were voiced back and understood by the patient.  All questions were answered to the patient's satisfaction.  The patient agrees to comply with a standard postoperative protocol, and is willing to proceed.  Education was provided via written and auditory forms.  There were no barriers to learning. Written handouts regarding wound care, incision and scar care, and general preoperative information, as well as risks and benefits were provided to the patient.    ____________________________________________________________________________________________________________________________________________      CHIEF COMPLAINT:  Chief Complaint   Patient presents with    Right Hand - Pain     Cyst on  "thumb, middle finger pain and wrist pain       SUBJECTIVE:  Екатерина Camacho is a 54 y.o. year old RHD female who presents to the office for evaluation of her right hand. I am seeing Екатерина in consultation at the request of Tyesha Razo PA-C. She notes pain in a c-shape to the base of her right thumb. She notes a cyst to the IP joint of her right thumb. She also notes a cyst or mass in line with her right long finger. She feels her wrist locks at times. She has a history of bilateral carpal tunnel releases and a left De Quervain's release. She will use Voltaren as needed for pain control.      Pain/symptom timing:  Worse during the day when active  Pain/symptom context:  Worse with activites and work  Pain/symptom modifying factors:  Rest makes better, activities make worse  Pain/symptom associated signs/symptoms: none    Prior treatment   NSAIDsYes   Injections No   Bracing/Orthotics No    Physical Therapy No     I have personally reviewed all the relevant PMH, PSH, SH, FH, Medications and allergies      PAST MEDICAL HISTORY:  Past Medical History:   Diagnosis Date    Acute pain of right shoulder     [id number] RESOLVED//seen by pain management    Allergic     Anesthesia     \"sometimes wakes up before procedure done ie, with MRI and EGD\"    Anxiety     [id number]  LAST ASSESSED    Arthritis     left knee    Asthma     allergy induced    Bulging of intervertebral disc between L4 and L5     Bursitis of right knee     better now    Depression     Ear infection     left PCP placed pt on antibiotic and ear drops    Elevated AST (SGOT)     [id number] RESOLVED    GERD (gastroesophageal reflux disease)     Goiter     [id number] RESOLVED//pt denies states has \"inflamed thyroid\"    Hiatal hernia     Hypothyroidism     [id number] RESOLVED//not on meds    Lipoma     left scapular area    Lump of axilla, right     [id number] RESOLVED marker clip in rt breast    Obstructive sleep apnea     [id number] RESOLVED    Psoriasis     Rash     " "under bilat breasts and folds of abdomen,, pt reports Dr Harkins aware and dermatologist is treating/pt using ketoconazole cream as directed    Shortness of breath     \"with exertion\"    Use of cane as ambulatory aid     occas       PAST SURGICAL HISTORY:  Past Surgical History:   Procedure Laterality Date    CARPAL TUNNEL RELEASE Bilateral     COLONOSCOPY      EGD      HYSTERECTOMY  12/30/2010    WITH ENDOMETERIAL ABLATION    KNEE SURGERY Left     NERVE BLOCK      PERIPH NERVE BLOCK WRIST MEDIAN W/LOCAL STEROID INFILTRAIT    PLANTAR FASCIA RELEASE Bilateral 2009, 2020 R    June 2020 on R    TN COLONOSCOPY FLX DX W/COLLJ SPEC WHEN PFRMD N/A 08/02/2018    Procedure: EGD AND COLONOSCOPY;  Surgeon: Mulugeta Gill MD;  Location: [facility];  Service: Gastroenterology    TN EXC B9 LESION MRGN XCP SK TG T/A/L 0.5 CM/< N/A 3/2/2023    Procedure: EXCISION WIDE LESION LEFT UPPER BACK;  Surgeon: Maya Chavez MD;  Location: [facility];  Service: General    TN FASCIECTOMY PLANTAR FASCIA PARTIAL SPX Right 06/09/2020    Procedure: RELEASE FASCIA PLANTAR/FASCIOTOMY ENDOSCOPIC (EPF);  Surgeon: Viraj Harkins DPM;  Location: [facility];  Service: Podiatry    TN RELEASE TARSAL TUNNEL Right 08/11/2020    Procedure: RELEASE TARSAL TUNNEL;  Surgeon: Viraj Harkins DPM;  Location: [facility];  Service: Podiatry    TONSILLECTOMY AND ADENOIDECTOMY         FAMILY HISTORY:  Family History   Problem Relation Age of Onset    Cirrhosis Mother     Hypertension Father     Nephrolithiasis Sister     Fibroids Sister     Fibroids Sister     Fibroids Sister     Anxiety disorder Daughter     Asthma Daughter     Ulcerative colitis Son     Heart disease Maternal Grandfather     Ovarian cancer Paternal Grandmother     Colon cancer Paternal Grandmother     Uterine cancer Maternal Aunt     Obesity Family        SOCIAL HISTORY:  Social History     Tobacco Use    Smoking status: Never     Passive exposure: Never    Smokeless tobacco: Never   Vaping Use "    Vaping status: Never Used   Substance Use Topics    Alcohol use: Yes     Comment: very rare (1 drink once per month - if that)    Drug use: No       MEDICATIONS:    Current Outpatient Medications:     albuterol (2.5 mg/3 mL) 0.083 % nebulizer solution, Take 3 mL (2.5 mg total) by nebulization every 4 (four) hours as needed for wheezing or shortness of breath, Disp: 75 mL, Rfl: 1    budesonide-formoterol (SYMBICORT) 80-4.5 MCG/ACT inhaler, Inhale 2 puffs 2 (two) times a day, Disp: 30.6 g, Rfl: 3    Guselkumab (Tremfya) 100 MG/ML SOAJ, Inject under the skin, Disp: , Rfl:     levothyroxine (Synthroid) 175 mcg tablet, Take 1 tablet (175 mcg total) by mouth daily, Disp: 90 tablet, Rfl: 1    montelukast (SINGULAIR) 10 mg tablet, TAKE 1 TABLET BY MOUTH DAILY AT BEDTIME, Disp: 90 tablet, Rfl: 1    pantoprazole (PROTONIX) 40 mg tablet, Take 1 tablet (40 mg total) by mouth 2 (two) times a day, Disp: 180 tablet, Rfl: 1    phentermine (ADIPEX-P) 37.5 MG tablet, Take 1 tablet (37.5 mg total) by mouth in the morning, Disp: 30 tablet, Rfl: 2    predniSONE 10 mg tablet, Take 3tabs daily for 3days then 2t daily for 3d then 1t daily for 3d, Disp: 18 tablet, Rfl: 0    Proventil  (90 Base) MCG/ACT inhaler, Inhale 2 puffs every 4 (four) hours as needed for wheezing, Disp: 20.1 g, Rfl: 5    valACYclovir (VALTREX) 500 mg tablet, Take 1 tablet (500 mg total) by mouth daily, Disp: 90 tablet, Rfl: 1    ALLERGIES:  Allergies   Allergen Reactions    Meloxicam GI Bleeding    Chlorhexidine Rash           REVIEW OF SYSTEMS:  Review of Systems   Constitutional:  Negative for chills, fever and unexpected weight change.   HENT:  Negative for hearing loss, nosebleeds and sore throat.    Eyes:  Negative for pain, redness and visual disturbance.   Respiratory:  Negative for cough, shortness of breath and wheezing.    Cardiovascular:  Negative for chest pain, palpitations and leg swelling.   Gastrointestinal:  Negative for abdominal pain,  nausea and vomiting.   Endocrine: Negative for polydipsia and polyuria.   Genitourinary:  Negative for difficulty urinating and hematuria.   Musculoskeletal:  Negative for arthralgias, joint swelling and myalgias.   Skin:  Negative for rash and wound.   Neurological:  Negative for dizziness, numbness and headaches.   Psychiatric/Behavioral:  Negative for decreased concentration, dysphoric mood and suicidal ideas. The patient is not nervous/anxious.        VITALS:  There were no vitals filed for this visit.    LABS:      _____________________________________________________  PHYSICAL EXAMINATION:  General: well developed and well nourished, alert, oriented times 3, and appears comfortable  Psychiatric: Normal  HEENT: Normocephalic, Atraumatic Trachea Midline, No torticollis  Pulmonary: No audible wheezing or respiratory distress   Abdomen/GI: Non tender, non distended   Cardiovascular: No pitting edema, 2+ radial pulse   Skin: No Erythema, No Lacerations, No Fluctuation, No Ulcerations  Neurovascular: Sensation Intact to the Median, Ulnar, Radial Nerve, Motor Intact to the Median, Ulnar, Radial Nerve, and Pulses Intact  Musculoskeletal: Normal, except as noted in detailed exam and in HPI.      MUSCULOSKELETAL EXAMINATION:    right CMC Exam:  No adduction contracture  No hyperextension deformity of MCP joint  Positive localized tenderness over radial and dorsal aspect of thumb (CMC joint)  Grind test is Negative for pain and Negative for crepitus  Metacarpal load shift test positive   No triggering or tenderness over the A1 pulley    Right thumb IP joint mucoid cyst, cyst is tender to palpation    Retinacular cyst in line with the right long finger, A1 pulley tenderness    A1 pulley tenderness of the right thumb, limited flexion      ___________________________________________________  STUDIES REVIEWED:  I have personally reviewed and interpreted  AP lateral and oblique radiographs of right hand which demonstrate  degenerative change of the IP joint with radial osteophyte formation and dorsal loose body likely previous osteophyte that has fractured off      LABS REVIEWED:    HgA1c:   Lab Results   Component Value Date    HGBA1C 6.2 (H) 07/28/2014     BMP:   Lab Results   Component Value Date    GLUCOSE 106 12/12/2015    CALCIUM 9.4 01/15/2025     12/12/2015    K 3.7 01/15/2025    CO2 29 01/15/2025     01/15/2025    BUN 21 01/15/2025    CREATININE 0.78 01/15/2025                 _____________________________________________________      Scribe Attestation      I,:  Cheryle Loza MA am acting as a scribe while in the presence of the attending physician.:       I,:  Baljit Abdi MD personally performed the services described in this documentation    as scribed in my presence.:

## 2025-02-26 NOTE — H&P
Baljit Abdi M.D.  Attending, Orthopaedic Surgery  Hand, Wrist, and Elbow Surgery  [facility]      ORTHOPAEDIC HAND, WRIST, AND ELBOW OFFICE  VISIT       ASSESSMENT/PLAN:        Assessment & Plan  Ganglion cyst  The etiology of a retinacular cyst was discussed along with treatment options   Right long finger mucoid cyst excision and trigger finger release was discussed at length including risks and benefits   We discussed a trigger finger release at the time of the retinacular cyst excision due to location of the cyst.  Common risks include bleeding, infection, injury to nearby structures (vessels, nerves, tendons, ligaments), blood clots (deep vein thrombosis / pulmonary embolus), and wound healing problems. Infection may result in an infection of the blood stream and/or the need for oral or intravenous antibiotics. Additional risks include impaired limb function, loss of limb and/or failure to achieve desired results. With tendon work, there is a risk of re-rupture of tendon, and inability to repair tendon. There is an increased risk in obese patients and people who smoke of wound infection, heart and lung complications and blood clots (thrombosis). There is a risk for unacceptable cosmetic results (such as scarring). Surgery of the hand is associated with bleeding, infection, scar, pain, wound healing problems, damage to nerves, arteries, tendons, ligaments, failure to give desired result, instability, nonunion, malunion hardware issues, and need for more surgery.  She elected to proceed with surgical intervention and informed electronic surgical consent was signed for right thumb mucoid cyst excision, arthrotomy, synovectomy and ostectomy, right long and thumb trigger finger release and reticular cyst excision of the right long finger.   Post operative instructions/expectations were reviewed and provided in AVS.   Surgery will ne planned for May.     Orders:    XR hand 3+ vw right;  Future    Basic metabolic panel; Future    EKG 12 lead; Future    Mucoid cyst of joint  X-rays were performed in the office and reviewed.   The etiology of a mucoid cyst was discussed along with treatment options.   We discussed in office aspiration vs surgical excision with IP joint arthrotomy, synovectomy and ostectomy. We discussed with surgical intervention it is possible the cyst will reoccur as this is secondary to underling arthritis.   Common risks include bleeding, infection, injury to nearby structures (vessels, nerves, tendons, ligaments), blood clots (deep vein thrombosis / pulmonary embolus), and wound healing problems. Infection may result in an infection of the blood stream and/or the need for oral or intravenous antibiotics. Additional risks include impaired limb function, loss of limb and/or failure to achieve desired results. With tendon work, there is a risk of re-rupture of tendon, and inability to repair tendon. With joint work, there is a risk for post-traumatic arthritis. There is an increased risk in obese patients and people who smoke of wound infection, heart and lung complications and blood clots (thrombosis). There is a risk for unacceptable cosmetic results (such as scarring). Surgery of the hand is associated with bleeding, infection, scar, pain, wound healing problems, damage to nerves, arteries, tendons, ligaments, failure to give desired result, instability, nonunion, malunion hardware issues, and need for more surgery.  She elected to proceed with surgical intervention and informed electronic surgical consent was signed for right thumb mucoid cyst excision, arthrotomy, synovectomy and ostectomy, right long and thumb trigger finger release and reticular cyst excision of the right long finger.   Post operative instructions/expectations were reviewed and provided in AVS.   Surgery will ne planned for May.   Orders:    Ambulatory Referral to Orthopedic Surgery    Basic metabolic panel;  Future    EKG 12 lead; Future    Trigger finger, right middle finger    Orders:    Case request operating room: EXCISION MUCOID CYST, IP ARTHROTOMY SYNOVECTOMY, OSTECTOMY, RELEASE TRIGGER FINGER; Standing    Basic metabolic panel; Future    EKG 12 lead; Future    Trigger thumb of right hand  The etiology of a trigger finger was discussed along with treatment options.   We discussed a trigger finger release vs trigger finger CSI.   Risks and benefits were discussed at length.   Common risks include bleeding, infection, injury to nearby structures (vessels, nerves, tendons, ligaments), blood clots (deep vein thrombosis / pulmonary embolus), and wound healing problems. Infection may result in an infection of the blood stream and/or the need for oral or intravenous antibiotics. Additional risks include impaired limb function, loss of limb and/or failure to achieve desired results. With tendon work, there is a risk of re-rupture of tendon, and inability to repair tendon. There is an increased risk in obese patients and people who smoke of wound infection, heart and lung complications and blood clots (thrombosis). There is a risk for unacceptable cosmetic results (such as scarring). Surgery of the hand is associated with bleeding, infection, scar, pain, wound healing problems, damage to nerves, arteries, tendons, ligaments, failure to give desired result, instability, nonunion, malunion hardware issues, and need for more surgery.  She elected to proceed with surgical intervention and informed electronic surgical consent was signed for right thumb mucoid cyst excision, arthrotomy, synovectomy and ostectomy, right long and thumb trigger finger release and reticular cyst excision of the right long finger.   Post operative instructions/expectations were reviewed and provided in AVS.   Surgery will ne planned for May.   Orders:    Case request operating room: EXCISION MUCOID CYST, IP ARTHROTOMY SYNOVECTOMY, OSTECTOMY, RELEASE  TRIGGER FINGER; Standing    Basic metabolic panel; Future    EKG 12 lead; Future    Pre-procedure lab exam    Orders:    Basic metabolic panel; Future    EKG 12 lead; Future      The patient verbalized understanding of exam findings and treatment plan. We engaged in the shared decision-making process and treatment options were discussed at length with the patient. Surgical and conservative management discussed today along with risks and benefits.    Diagnoses and all orders for this visit:    Ganglion cyst  -     XR hand 3+ vw right; Future  -     Basic metabolic panel; Future  -     EKG 12 lead; Future    Mucoid cyst of joint  -     Ambulatory Referral to Orthopedic Surgery  -     Basic metabolic panel; Future  -     EKG 12 lead; Future    Trigger finger, right middle finger  -     Case request operating room: EXCISION MUCOID CYST, IP ARTHROTOMY SYNOVECTOMY, OSTECTOMY, RELEASE TRIGGER FINGER; Standing  -     Basic metabolic panel; Future  -     EKG 12 lead; Future  -     Case request operating room: EXCISION MUCOID CYST, IP ARTHROTOMY SYNOVECTOMY, OSTECTOMY, RELEASE TRIGGER FINGER    Trigger thumb of right hand  -     Case request operating room: EXCISION MUCOID CYST, IP ARTHROTOMY SYNOVECTOMY, OSTECTOMY, RELEASE TRIGGER FINGER; Standing  -     Basic metabolic panel; Future  -     EKG 12 lead; Future  -     Case request operating room: EXCISION MUCOID CYST, IP ARTHROTOMY SYNOVECTOMY, OSTECTOMY, RELEASE TRIGGER FINGER    Pre-procedure lab exam  -     Basic metabolic panel; Future  -     EKG 12 lead; Future    Other orders  -     Nursing Communication Warmimg Interventions Implemented; Standing  -     Nursing Communication CHG bath, have staff wash entire body (neck down) per pre-op bathing protocol. Routine, evening prior to, and day of surgery.; Standing  -     Nursing Communication Swab both nares with Povidone-Iodine solution, EXCLUDE if patient has shellfish/Iodine allergy, and replace with nasal alcohol  swabstick. Routine, day of surgery, on call to OR; Standing  -     Void on call to OR; Standing  -     Insert peripheral IV; Standing  -     Diet NPO; Sips with meds; Standing  -     sodium chloride 0.9 % infusion  -     ceFAZolin (ANCEF) 3,000 mg in sodium chloride 0.9 % 50 mL IVPB  -     Apply Sequential Compression Device; Standing      Follow Up:  Return for 10-14 days , post op.    To Do Next Visit:  Re-evaluation of current issue      General Discussions:  CMC Arthritis: The anatomy and physiology of carpometacarpal joint arthritis was discussed with the patient today in the office.  Deterioration of the articular cartilage eventually leads to hypermobility at the thumb CMC joint, resulting in joint subluxation, osteophyte formation, cystic changes within the trapezium and base of the first metacarpal, as well as subchondral sclerosis.  Eventually, pain, limited mobility, and compensatory hyperextension at the metacarpophalangeal joint may develop.  While normal activity and usage of the thumb joint may provide a painful experience to the patient, this typically does not result in damage to the thumb or hand.  Treatment options include resting thumb spica splints to decreased joint edema, pain, and inflammation.  Therapy exercises to strengthen the thenar musculature may relieve pain, but do not alter the overall continued development of osteoarthritis.  Oral medications, topical medications, corticosteroid injections may decrease pain and increase overall function.  Eventually, approximately 5% of patients may require surgical intervention.                                                                                                                                                                                                 Ganglion Cysts: The anatomy and physiology of the ganglion was discussed with the patient today in the office.  Fluid leaking out of the joint surface typically creates a small sac,  which can enlarge and cause pain or limitation of motion.  Treatment options include observation, aspiration, or surgical incision were discussed with the patient today.  Observation typically lead to resolution and approximately 10% of patients, aspiration results in resolution of approximately 50% of patients, and surgical excision leads to resolution in approximately 97% of patients.  After discussion with the patient today, the patient voiced understanding of all treatment options.    Trigger Finger: The anatomy and physiology of trigger finger was discussed with the patient today in the office.  Edema and increased contact pressure within the flexor tendons at the A1 pulley can cause pain, crepitation, and triggering or locking of the digit resulting in limitation of function.  Symptoms can occur at anytime but are typically worse in the morning or after a brief rest from repetitive activity.  Treatment options include resting/nighttime MP blocking splints to decrease edema, oral anti-inflammatory medications, home or formal therapy exercises, up to 2 steroid injections within the tendon sheath, or surgical release.  While majority of patients do respond to conservative treatment, up to 20% may require surgical release.     Operative Discussions:  Ganglion Cyst Excision: The anatomy and physiology of the ganglion was discussed with the patient today in the office.  Fluid leaking out of the joint surface typically creates a small sac, which can enlarge and cause pain or limitation of motion.  Treatment options include observation, aspiration, or surgical incision were discussed with the patient today.  Observation typically lead to resolution and approximately 10% of patients, aspiration least resolution approximately 50% of patients, and surgical excision lead to resolution in approximately 97% of patients.  After discussion with the patient today, the patient voiced understanding of all treatment options.The  patient has elected excision of the ganglion.The risks and benefits of the procedure were explained to the patient, which include, but are not limited to: Bleeding, infection, recurrence, pain, scar, damage to tendons, damage to nerves, and damage to blood vessels, failure to give desired results and complications related to anesthesia.  These risks, along with alternative conservative treatment options, and postoperative protocols were voiced back and understood by the patient.  All questions were answered to the patient's satisfaction.  The patient agrees to comply with a standard postoperative protocol, and is willing to proceed.  Education was provided via written and auditory forms.  There were no barriers to learning. Written handouts regarding wound care, incision and scar care, and general preoperative information was provided to the patient.  Prior to surgery, the patient may be requested to stop all anti-inflammatory medications.  Prophylactic aspirin, Plavix, and Coumadin may be allowed to be continued.  Medications including vitamin E., ginkgo, and fish oil are requested to be stopped approximately one week prior to surgery.  Hypertensive medications and beta blockers, if taken, should be continued. and Trigger Finger Release: The anatomy and physiology of trigger finger was discussed with the patient today in the office.  Edema and increased contact pressure within the flexor tendons at the A1 pulley can cause pain, crepitation, and limitation of function.  Treatment options include resting MP blocking splints to decrease edema, oral anti-inflammatory medications, home or formal therapy exercises, up to 2 steroid injections or surgical release.  While majority of patients do respond to conservative treatment, up to 20% may require surgical release.  The patient has elected release of the trigger finger.  The patient has elected to undergo a release of the A1 pulley (trigger finger).  A small incision  will be made over the palmar aspect of the hand, the tendon sheath holding the flexor tendons will be released.  In the postoperative period, light activities are allowed immediately, driving is allowed when narcotic medication has stopped, and the incision may get wet after 2 days.  Heavy activities (lifting more than approximately 10 pounds) will be allowed after the follow up appointment in 1-2 weeks.  While the pain and discomfort within the wrist typically improves rapidly, some residual discomfort may be present for up to 6 weeks.  The nodule that is typically palpable in the palmar aspect of the hand will not be removed, as this would necessitate removal of a portion of the flexor tendon, however the catching, clicking, and locking should resolve.  Approximate success rate is 98%.The risks and benefits of the procedure were explained to the patient, which include, but are not limited to: Bleeding, infection, recurrence, pain, scar, damage to tendons, damage to nerves, and damage to blood vessels, need for future surgery and complications related to anesthesia.  If bony work is done, risks also include malunion and nonunion.  These risks, along with alternative conservative treatment options, and postoperative protocols were voiced back and understood by the patient.  All questions were answered to the patient's satisfaction.  The patient agrees to comply with a standard postoperative protocol, and is willing to proceed.  Education was provided via written and auditory forms.  There were no barriers to learning. Written handouts regarding wound care, incision and scar care, and general preoperative information, as well as risks and benefits were provided to the patient.    ____________________________________________________________________________________________________________________________________________      CHIEF COMPLAINT:  Chief Complaint   Patient presents with    Right Hand - Pain     Cyst on  "thumb, middle finger pain and wrist pain       SUBJECTIVE:  Екатерина Camacho is a 54 y.o. year old RHD female who presents to the office for evaluation of her right hand. I am seeing Екатерина in consultation at the request of Tyesha Razo PA-C. She notes pain in a c-shape to the base of her right thumb. She notes a cyst to the IP joint of her right thumb. She also notes a cyst or mass in line with her right long finger. She feels her wrist locks at times. She has a history of bilateral carpal tunnel releases and a left De Quervain's release. She will use Voltaren as needed for pain control.      Pain/symptom timing:  Worse during the day when active  Pain/symptom context:  Worse with activites and work  Pain/symptom modifying factors:  Rest makes better, activities make worse  Pain/symptom associated signs/symptoms: none    Prior treatment   NSAIDsYes   Injections No   Bracing/Orthotics No    Physical Therapy No     I have personally reviewed all the relevant PMH, PSH, SH, FH, Medications and allergies      PAST MEDICAL HISTORY:  Past Medical History:   Diagnosis Date    Acute pain of right shoulder     [id number] RESOLVED//seen by pain management    Allergic     Anesthesia     \"sometimes wakes up before procedure done ie, with MRI and EGD\"    Anxiety     [id number]  LAST ASSESSED    Arthritis     left knee    Asthma     allergy induced    Bulging of intervertebral disc between L4 and L5     Bursitis of right knee     better now    Depression     Ear infection     left PCP placed pt on antibiotic and ear drops    Elevated AST (SGOT)     [id number] RESOLVED    GERD (gastroesophageal reflux disease)     Goiter     [id number] RESOLVED//pt denies states has \"inflamed thyroid\"    Hiatal hernia     Hypothyroidism     [id number] RESOLVED//not on meds    Lipoma     left scapular area    Lump of axilla, right     [id number] RESOLVED marker clip in rt breast    Obstructive sleep apnea     [id number] RESOLVED    Psoriasis     Rash     " "under bilat breasts and folds of abdomen,, pt reports Dr Harkins aware and dermatologist is treating/pt using ketoconazole cream as directed    Shortness of breath     \"with exertion\"    Use of cane as ambulatory aid     occas       PAST SURGICAL HISTORY:  Past Surgical History:   Procedure Laterality Date    CARPAL TUNNEL RELEASE Bilateral     COLONOSCOPY      EGD      HYSTERECTOMY  12/30/2010    WITH ENDOMETERIAL ABLATION    KNEE SURGERY Left     NERVE BLOCK      PERIPH NERVE BLOCK WRIST MEDIAN W/LOCAL STEROID INFILTRAIT    PLANTAR FASCIA RELEASE Bilateral 2009, 2020 R    June 2020 on R    TN COLONOSCOPY FLX DX W/COLLJ SPEC WHEN PFRMD N/A 08/02/2018    Procedure: EGD AND COLONOSCOPY;  Surgeon: Mulugeta Gill MD;  Location: [facility];  Service: Gastroenterology    TN EXC B9 LESION MRGN XCP SK TG T/A/L 0.5 CM/< N/A 3/2/2023    Procedure: EXCISION WIDE LESION LEFT UPPER BACK;  Surgeon: Maya Chavez MD;  Location: [facility];  Service: General    TN FASCIECTOMY PLANTAR FASCIA PARTIAL SPX Right 06/09/2020    Procedure: RELEASE FASCIA PLANTAR/FASCIOTOMY ENDOSCOPIC (EPF);  Surgeon: Viraj Harknis DPM;  Location: [facility];  Service: Podiatry    TN RELEASE TARSAL TUNNEL Right 08/11/2020    Procedure: RELEASE TARSAL TUNNEL;  Surgeon: Viraj Harkins DPM;  Location: [facility];  Service: Podiatry    TONSILLECTOMY AND ADENOIDECTOMY         FAMILY HISTORY:  Family History   Problem Relation Age of Onset    Cirrhosis Mother     Hypertension Father     Nephrolithiasis Sister     Fibroids Sister     Fibroids Sister     Fibroids Sister     Anxiety disorder Daughter     Asthma Daughter     Ulcerative colitis Son     Heart disease Maternal Grandfather     Ovarian cancer Paternal Grandmother     Colon cancer Paternal Grandmother     Uterine cancer Maternal Aunt     Obesity Family        SOCIAL HISTORY:  Social History     Tobacco Use    Smoking status: Never     Passive exposure: Never    Smokeless tobacco: Never   Vaping Use "    Vaping status: Never Used   Substance Use Topics    Alcohol use: Yes     Comment: very rare (1 drink once per month - if that)    Drug use: No       MEDICATIONS:    Current Outpatient Medications:     albuterol (2.5 mg/3 mL) 0.083 % nebulizer solution, Take 3 mL (2.5 mg total) by nebulization every 4 (four) hours as needed for wheezing or shortness of breath, Disp: 75 mL, Rfl: 1    budesonide-formoterol (SYMBICORT) 80-4.5 MCG/ACT inhaler, Inhale 2 puffs 2 (two) times a day, Disp: 30.6 g, Rfl: 3    Guselkumab (Tremfya) 100 MG/ML SOAJ, Inject under the skin, Disp: , Rfl:     levothyroxine (Synthroid) 175 mcg tablet, Take 1 tablet (175 mcg total) by mouth daily, Disp: 90 tablet, Rfl: 1    montelukast (SINGULAIR) 10 mg tablet, TAKE 1 TABLET BY MOUTH DAILY AT BEDTIME, Disp: 90 tablet, Rfl: 1    pantoprazole (PROTONIX) 40 mg tablet, Take 1 tablet (40 mg total) by mouth 2 (two) times a day, Disp: 180 tablet, Rfl: 1    phentermine (ADIPEX-P) 37.5 MG tablet, Take 1 tablet (37.5 mg total) by mouth in the morning, Disp: 30 tablet, Rfl: 2    predniSONE 10 mg tablet, Take 3tabs daily for 3days then 2t daily for 3d then 1t daily for 3d, Disp: 18 tablet, Rfl: 0    Proventil  (90 Base) MCG/ACT inhaler, Inhale 2 puffs every 4 (four) hours as needed for wheezing, Disp: 20.1 g, Rfl: 5    valACYclovir (VALTREX) 500 mg tablet, Take 1 tablet (500 mg total) by mouth daily, Disp: 90 tablet, Rfl: 1    ALLERGIES:  Allergies   Allergen Reactions    Meloxicam GI Bleeding    Chlorhexidine Rash           REVIEW OF SYSTEMS:  Review of Systems   Constitutional:  Negative for chills, fever and unexpected weight change.   HENT:  Negative for hearing loss, nosebleeds and sore throat.    Eyes:  Negative for pain, redness and visual disturbance.   Respiratory:  Negative for cough, shortness of breath and wheezing.    Cardiovascular:  Negative for chest pain, palpitations and leg swelling.   Gastrointestinal:  Negative for abdominal pain,  nausea and vomiting.   Endocrine: Negative for polydipsia and polyuria.   Genitourinary:  Negative for difficulty urinating and hematuria.   Musculoskeletal:  Negative for arthralgias, joint swelling and myalgias.   Skin:  Negative for rash and wound.   Neurological:  Negative for dizziness, numbness and headaches.   Psychiatric/Behavioral:  Negative for decreased concentration, dysphoric mood and suicidal ideas. The patient is not nervous/anxious.        VITALS:  There were no vitals filed for this visit.    LABS:      _____________________________________________________  PHYSICAL EXAMINATION:  General: well developed and well nourished, alert, oriented times 3, and appears comfortable  Psychiatric: Normal  HEENT: Normocephalic, Atraumatic Trachea Midline, No torticollis  Pulmonary: No audible wheezing or respiratory distress   Abdomen/GI: Non tender, non distended   Cardiovascular: No pitting edema, 2+ radial pulse   Skin: No Erythema, No Lacerations, No Fluctuation, No Ulcerations  Neurovascular: Sensation Intact to the Median, Ulnar, Radial Nerve, Motor Intact to the Median, Ulnar, Radial Nerve, and Pulses Intact  Musculoskeletal: Normal, except as noted in detailed exam and in HPI.      MUSCULOSKELETAL EXAMINATION:    right CMC Exam:  No adduction contracture  No hyperextension deformity of MCP joint  Positive localized tenderness over radial and dorsal aspect of thumb (CMC joint)  Grind test is Negative for pain and Negative for crepitus  Metacarpal load shift test positive   No triggering or tenderness over the A1 pulley    Right thumb IP joint mucoid cyst, cyst is tender to palpation    Retinacular cyst in line with the right long finger, A1 pulley tenderness    A1 pulley tenderness of the right thumb, limited flexion      ___________________________________________________  STUDIES REVIEWED:  I have personally reviewed and interpreted  AP lateral and oblique radiographs of right hand which demonstrate  degenerative change of the IP joint with radial osteophyte formation and dorsal loose body likely previous osteophyte that has fractured off      LABS REVIEWED:    HgA1c:   Lab Results   Component Value Date    HGBA1C 6.2 (H) 07/28/2014     BMP:   Lab Results   Component Value Date    GLUCOSE 106 12/12/2015    CALCIUM 9.4 01/15/2025     12/12/2015    K 3.7 01/15/2025    CO2 29 01/15/2025     01/15/2025    BUN 21 01/15/2025    CREATININE 0.78 01/15/2025                 _____________________________________________________      Scribe Attestation      I,:  Cheryle Loza MA am acting as a scribe while in the presence of the attending physician.:       I,:  Baljit Abdi MD personally performed the services described in this documentation    as scribed in my presence.:

## 2025-02-27 DIAGNOSIS — E66.813 CLASS 3 SEVERE OBESITY DUE TO EXCESS CALORIES WITH SERIOUS COMORBIDITY AND BODY MASS INDEX (BMI) OF 50.0 TO 59.9 IN ADULT (HCC): ICD-10-CM

## 2025-02-27 DIAGNOSIS — E66.01 CLASS 3 SEVERE OBESITY DUE TO EXCESS CALORIES WITH SERIOUS COMORBIDITY AND BODY MASS INDEX (BMI) OF 50.0 TO 59.9 IN ADULT (HCC): ICD-10-CM

## 2025-02-27 RX ORDER — PHENTERMINE HYDROCHLORIDE 37.5 MG/1
37.5 TABLET ORAL DAILY
Qty: 30 TABLET | Refills: 3 | Status: SHIPPED | OUTPATIENT
Start: 2025-02-27

## 2025-02-27 RX ORDER — PHENTERMINE HYDROCHLORIDE 37.5 MG/1
37.5 TABLET ORAL DAILY
Qty: 90 TABLET | Refills: 1 | Status: SHIPPED | OUTPATIENT
Start: 2025-02-27 | End: 2025-02-27 | Stop reason: SDUPTHER

## 2025-03-17 ENCOUNTER — OFFICE VISIT (OUTPATIENT)
Dept: URGENT CARE | Facility: CLINIC | Age: 55
End: 2025-03-17
Payer: COMMERCIAL

## 2025-03-17 ENCOUNTER — TELEPHONE (OUTPATIENT)
Dept: FAMILY MEDICINE CLINIC | Facility: CLINIC | Age: 55
End: 2025-03-17

## 2025-03-17 VITALS
RESPIRATION RATE: 18 BRPM | HEART RATE: 96 BPM | OXYGEN SATURATION: 98 % | SYSTOLIC BLOOD PRESSURE: 143 MMHG | BODY MASS INDEX: 51.91 KG/M2 | DIASTOLIC BLOOD PRESSURE: 74 MMHG | WEIGHT: 293 LBS | TEMPERATURE: 97 F | HEIGHT: 63 IN

## 2025-03-17 DIAGNOSIS — J06.9 VIRAL URI: Primary | ICD-10-CM

## 2025-03-17 PROCEDURE — 99203 OFFICE O/P NEW LOW 30 MIN: CPT | Performed by: PHYSICIAN ASSISTANT

## 2025-03-17 NOTE — PROGRESS NOTES
[facility]        NAME: Екатерина Camacho is a 54 y.o. female  : 1970    MRN: [id number]  DATE: 2025  TIME: 3:03 PM    Assessment and Plan   Viral URI [J06.9]  1. Viral URI          Patient declined COVID flu and strep testing.  Discussed importance of staying hydrated. Discussed supportive care and otc meds for symptomatic relief. May use tea with honey and a cool mist humidifier for symptoms. Encouraged salt water gargles if sore throat. Discussed strict return to care precautions as well as red flag symptoms which should prompt immediate ED referral. Pt verbalized understanding and is in agreement with plan.  Please follow up with your primary care provider within the next week. Please remember that your visit today was with an urgent care provider and should not replace follow up with your primary care provider for chronic medical issues or annual physicals.       Patient Instructions       Follow up with PCP in 3-5 days.  Proceed to  ER if symptoms worsen.    If tests are performed, our office will contact you with results only if changes need to made to the care plan discussed with you at the visit. [facility]    Chief Complaint     Chief Complaint   Patient presents with    Nasal Congestion     Nasal congestion   with sore throat that began Friday          History of Present Illness       Екатерина Camacho is a(n) 54 y.o. female presenting with URI symptoms x 2 days  Past medical history: asthma, GERD  Congestion: yes  Sore throat: yes  Cough: yes  Sputum production: yes, yellow  Fever: no  Body aches: no  GI symptoms: no  Known sick contacts: yes, works as a  and recently attended a   OTC meds tried: dayquil/nyquil        Review of Systems   Review of Systems   Constitutional:         Negative except as noted in HPI   Respiratory:  Negative for shortness of breath.    Cardiovascular:  Negative for chest pain.          Current Medications       Current Outpatient Medications:     budesonide-formoterol (SYMBICORT) 80-4.5 MCG/ACT inhaler, Inhale 2 puffs 2 (two) times a day, Disp: 30.6 g, Rfl: 3    Guselkumab (Tremfya) 100 MG/ML SOAJ, Inject under the skin, Disp: , Rfl:     levothyroxine (Synthroid) 175 mcg tablet, Take 1 tablet (175 mcg total) by mouth daily, Disp: 90 tablet, Rfl: 1    montelukast (SINGULAIR) 10 mg tablet, TAKE 1 TABLET BY MOUTH DAILY AT BEDTIME, Disp: 90 tablet, Rfl: 1    pantoprazole (PROTONIX) 40 mg tablet, Take 1 tablet (40 mg total) by mouth 2 (two) times a day, Disp: 180 tablet, Rfl: 1    phentermine (ADIPEX-P) 37.5 MG tablet, Take 1 tablet (37.5 mg total) by mouth in the morning, Disp: 30 tablet, Rfl: 3    Proventil  (90 Base) MCG/ACT inhaler, Inhale 2 puffs every 4 (four) hours as needed for wheezing, Disp: 20.1 g, Rfl: 5    albuterol (2.5 mg/3 mL) 0.083 % nebulizer solution, Take 3 mL (2.5 mg total) by nebulization every 4 (four) hours as needed for wheezing or shortness of breath (Patient not taking: Reported on 3/17/2025), Disp: 75 mL, Rfl: 1    predniSONE 10 mg tablet, Take 3tabs daily for 3days then 2t daily for 3d then 1t daily for 3d (Patient not taking: Reported on 3/17/2025), Disp: 18 tablet, Rfl: 0    valACYclovir (VALTREX) 500 mg tablet, Take 1 tablet (500 mg total) by mouth daily (Patient not taking: Reported on 3/17/2025), Disp: 90 tablet, Rfl: 1    Current Allergies     Allergies as of 03/17/2025 - Reviewed 03/17/2025   Allergen Reaction Noted    Meloxicam GI Bleeding 10/14/2024    Chlorhexidine Rash 03/16/2023            The following portions of the patient's history were reviewed and updated as appropriate: allergies, current medications, past family history, past medical history, past social history, past surgical history and problem list.     Past Medical History:   Diagnosis Date    Acute pain of right shoulder     [id number] RESOLVED//seen by pain management    Allergic      "Anesthesia     \"sometimes wakes up before procedure done ie, with MRI and EGD\"    Anxiety     [id number]  LAST ASSESSED    Arthritis     left knee    Asthma     allergy induced    Bulging of intervertebral disc between L4 and L5     Bursitis of right knee     better now    Depression     Ear infection     left PCP placed pt on antibiotic and ear drops    Elevated AST (SGOT)     [id number] RESOLVED    GERD (gastroesophageal reflux disease)     Goiter     [id number] RESOLVED//pt denies states has \"inflamed thyroid\"    Hiatal hernia     Hypothyroidism     [id number] RESOLVED//not on meds    Lipoma     left scapular area    Lump of axilla, right     [id number] RESOLVED marker clip in rt breast    Obstructive sleep apnea     [id number] RESOLVED    Psoriasis     Rash     under bilat breasts and folds of abdomen,, pt reports Dr Harkins aware and dermatologist is treating/pt using ketoconazole cream as directed    Shortness of breath     \"with exertion\"    Use of cane as ambulatory aid     occas       Past Surgical History:   Procedure Laterality Date    CARPAL TUNNEL RELEASE Bilateral     COLONOSCOPY      EGD      HYSTERECTOMY  12/30/2010    WITH ENDOMETERIAL ABLATION    KNEE SURGERY Left     NERVE BLOCK      PERIPH NERVE BLOCK WRIST MEDIAN W/LOCAL STEROID INFILTRAIT    PLANTAR FASCIA RELEASE Bilateral 2009, 2020 R    June 2020 on R    KY COLONOSCOPY FLX DX W/COLLJ SPEC WHEN PFRMD N/A 08/02/2018    Procedure: EGD AND COLONOSCOPY;  Surgeon: Mulugeta Gill MD;  [facility];  Service: Gastroenterology    KY EXC B9 LESION MRGN XCP SK TG T/A/L 0.5 CM/< N/A 3/2/2023    Procedure: EXCISION WIDE LESION LEFT UPPER BACK;  Surgeon: Maya Chavez MD;  [facility];  Service: General    KY FASCIECTOMY PLANTAR FASCIA PARTIAL SPX Right 06/09/2020    Procedure: RELEASE FASCIA PLANTAR/FASCIOTOMY ENDOSCOPIC (EPF);  Surgeon: Viraj Harkins DPM;  [facility];  Service: Podiatry    KY RELEASE TARSAL TUNNEL Right " "08/11/2020    Procedure: RELEASE TARSAL TUNNEL;  Surgeon: Viraj Harkins DPM;  [facility];  Service: Podiatry    TONSILLECTOMY AND ADENOIDECTOMY         Family History   Problem Relation Age of Onset    Cirrhosis Mother     Hypertension Father     Nephrolithiasis Sister     Fibroids Sister     Fibroids Sister     Fibroids Sister     Anxiety disorder Daughter     Asthma Daughter     Ulcerative colitis Son     Heart disease Maternal Grandfather     Ovarian cancer Paternal Grandmother     Colon cancer Paternal Grandmother     Uterine cancer Maternal Aunt     Obesity Family          Medications have been verified.        Objective   /74   Pulse 96   Temp (!) 97 °F (36.1 °C)   Resp 18   Ht 5' 3\" (1.6 m)   Wt 135 kg (298 lb)   SpO2 98%   BMI 52.79 kg/m²        Physical Exam     Physical Exam  Vitals and nursing note reviewed.   Constitutional:       General: She is not in acute distress.     Appearance: Normal appearance. She is not toxic-appearing.   HENT:      Head: Normocephalic and atraumatic.      Right Ear: Tympanic membrane, ear canal and external ear normal.      Left Ear: Tympanic membrane, ear canal and external ear normal.      Nose: Congestion present.      Mouth/Throat:      Mouth: Mucous membranes are moist.      Pharynx: Oropharynx is clear. No oropharyngeal exudate or posterior oropharyngeal erythema.   Eyes:      Conjunctiva/sclera: Conjunctivae normal.      Pupils: Pupils are equal, round, and reactive to light.   Cardiovascular:      Rate and Rhythm: Normal rate and regular rhythm.      Heart sounds: Normal heart sounds.   Pulmonary:      Effort: Pulmonary effort is normal. No respiratory distress.      Breath sounds: Normal breath sounds. No wheezing, rhonchi or rales.   Musculoskeletal:      Cervical back: Normal range of motion and neck supple.   Lymphadenopathy:      Cervical: No cervical adenopathy.   Skin:     General: Skin is warm and dry.      Capillary Refill: Capillary " refill takes less than 2 seconds.   Neurological:      Mental Status: She is alert and oriented to person, place, and time.   Psychiatric:         Behavior: Behavior normal.

## 2025-04-06 ENCOUNTER — RESULTS FOLLOW-UP (OUTPATIENT)
Dept: FAMILY MEDICINE CLINIC | Facility: CLINIC | Age: 55
End: 2025-04-06

## 2025-04-06 ENCOUNTER — APPOINTMENT (OUTPATIENT)
Dept: LAB | Facility: HOSPITAL | Age: 55
End: 2025-04-06
Payer: COMMERCIAL

## 2025-04-06 DIAGNOSIS — E06.3 HYPOTHYROIDISM DUE TO HASHIMOTO'S THYROIDITIS: Primary | ICD-10-CM

## 2025-04-06 DIAGNOSIS — K76.0 METABOLIC DYSFUNCTION-ASSOCIATED STEATOTIC LIVER DISEASE (MASLD): ICD-10-CM

## 2025-04-06 DIAGNOSIS — E06.3 HYPOTHYROIDISM DUE TO HASHIMOTO'S THYROIDITIS: ICD-10-CM

## 2025-04-06 LAB
ALBUMIN SERPL BCG-MCNC: 4.1 G/DL (ref 3.5–5)
ALP SERPL-CCNC: 82 U/L (ref 34–104)
ALT SERPL W P-5'-P-CCNC: 27 U/L (ref 7–52)
AST SERPL W P-5'-P-CCNC: 21 U/L (ref 13–39)
BILIRUB DIRECT SERPL-MCNC: 0.11 MG/DL (ref 0–0.2)
BILIRUB SERPL-MCNC: 0.57 MG/DL (ref 0.2–1)
PROT SERPL-MCNC: 7.2 G/DL (ref 6.4–8.4)
T4 FREE SERPL-MCNC: 1.21 NG/DL (ref 0.61–1.12)
TSH SERPL DL<=0.05 MIU/L-ACNC: 0.11 UIU/ML (ref 0.45–4.5)

## 2025-04-06 PROCEDURE — 80076 HEPATIC FUNCTION PANEL: CPT

## 2025-04-06 PROCEDURE — 84439 ASSAY OF FREE THYROXINE: CPT

## 2025-04-06 PROCEDURE — 36415 COLL VENOUS BLD VENIPUNCTURE: CPT

## 2025-04-06 PROCEDURE — 84443 ASSAY THYROID STIM HORMONE: CPT

## 2025-04-06 RX ORDER — LEVOTHYROXINE SODIUM 150 UG/1
150 TABLET ORAL DAILY
Qty: 100 TABLET | Refills: 3 | Status: SHIPPED | OUTPATIENT
Start: 2025-04-06 | End: 2025-04-07 | Stop reason: CLARIF

## 2025-04-07 ENCOUNTER — NURSE TRIAGE (OUTPATIENT)
Age: 55
End: 2025-04-07

## 2025-04-07 DIAGNOSIS — E06.3 HYPOTHYROIDISM DUE TO HASHIMOTO'S THYROIDITIS: ICD-10-CM

## 2025-04-07 RX ORDER — LEVOTHYROXINE SODIUM 150 UG/1
150 TABLET ORAL DAILY
Qty: 100 TABLET | Refills: 3 | Status: SHIPPED | OUTPATIENT
Start: 2025-04-07

## 2025-04-07 NOTE — TELEPHONE ENCOUNTER
"Reason for Disposition   Prescription not at pharmacy and was prescribed by PCP recently  (Exception: triager has access to EMR and prescription is recorded there. Go to Home Care and confirm for pharmacy.)    Answer Assessment - Initial Assessment Questions  1. NAME of MEDICINE: \"What medicine(s) are you calling about?\"      levothyroxine (Synthroid) 150 mcg tablet     2. QUESTION: \"What is your question?\" (e.g., double dose of medicine, side effect)      Resend prescription to [facility] [id number] [place] [id number]  3. PRESCRIBER: \"Who prescribed the medicine?\" Reason: if prescribed by specialist, call should be referred to that group.      THEO Lewis    Protocols used: Medication Question Call-Adult-[place]    "

## 2025-04-07 NOTE — TELEPHONE ENCOUNTER
Екатерина Returned call. Relayed results to patient as per provider message. Patient expressed understanding and did not have any further questions.                                                             Patient request to change pharmacy for medication - Call hub sent to clinical team.

## 2025-04-09 ENCOUNTER — OFFICE VISIT (OUTPATIENT)
Age: 55
End: 2025-04-09
Payer: COMMERCIAL

## 2025-04-09 VITALS
DIASTOLIC BLOOD PRESSURE: 82 MMHG | WEIGHT: 293 LBS | BODY MASS INDEX: 51.91 KG/M2 | SYSTOLIC BLOOD PRESSURE: 118 MMHG | HEIGHT: 63 IN | HEART RATE: 74 BPM

## 2025-04-09 DIAGNOSIS — K76.0 METABOLIC DYSFUNCTION-ASSOCIATED STEATOTIC LIVER DISEASE (MASLD): Primary | ICD-10-CM

## 2025-04-09 DIAGNOSIS — K21.9 GASTROESOPHAGEAL REFLUX DISEASE WITHOUT ESOPHAGITIS: ICD-10-CM

## 2025-04-09 DIAGNOSIS — K92.1 MELENA: ICD-10-CM

## 2025-04-09 DIAGNOSIS — Z12.11 SCREENING FOR COLON CANCER: ICD-10-CM

## 2025-04-09 PROCEDURE — 99213 OFFICE O/P EST LOW 20 MIN: CPT | Performed by: FAMILY MEDICINE

## 2025-04-09 RX ORDER — DIPHENOXYLATE HYDROCHLORIDE AND ATROPINE SULFATE 2.5; .025 MG/1; MG/1
1 TABLET ORAL DAILY
COMMUNITY

## 2025-04-09 RX ORDER — PANTOPRAZOLE SODIUM 40 MG/1
40 TABLET, DELAYED RELEASE ORAL DAILY
Start: 2025-04-09

## 2025-04-09 NOTE — ASSESSMENT & PLAN NOTE
Patient reported melena, in the setting of NSAID (meloxicam) use, with an associated 2 g hemoglobin drop. She underwent an EGD (9/23/2024) notable for abnormal mucosa with concentric rings and linear furrows in the esophagus, ring in the GE junction, 4 cm hiatal hernia and blood present in the antrum. Biopsies showed reflux esophagitis. She was prescribed pantoprazole 40 to take twice daily and has since had a repeat EGD notable for LA grade A esophagitis but was otherwise unremarkable. Biopsies again consistent with reflux esophagitis.  No further overt GI bleeding or NSAID use.     Avoid any/all NSAID use.  She may reduce PPI to once daily.  Discussed optimization of PPI and dietary/lifestyle modifications for reflux.    Orders:  •  pantoprazole (PROTONIX) 40 mg tablet; Take 1 tablet (40 mg total) by mouth daily

## 2025-04-09 NOTE — PROGRESS NOTES
Name: Екатерина Camacho      : 1970      MRN: [id number]  Encounter Provider: Brooklyn Fine PA-C  Encounter Date: 2025   Encounter department: [facility] [address]  :  Assessment & Plan  Metabolic dysfunction-associated steatotic liver disease (MASLD)  Patient with a longstanding history of fatty liver disease but routinely normal liver chemistries. She had a recent RUQ US (4/3/2024) for the evaluation of abdominal pain which redemonstrated hepatomegaly and hepatic steatosis but also noted surface nodularity. She's had a complete serologic evaluation which was unremarkable for competing causes of liver disease. Her labs continue to show normal liver chemistries, normal liver synthetic function and a preserved platelet count. She also had a US elastography with F0-F1 fibrosis and S3 steatosis.     Unfortunately, she continues to struggle with weight loss despite dietary/lifestyle modifications as well as being on phentermine as prescribed by her PCP. She has an appointment with her PCP on 2025 and recommend resubmitting for Wegovy with documentation that she failed an alternative weight loss medication. Also discussed that we are happy to try and represcribe Wegovy if her PCP prefers.  She is not interested in bariatric surgery.    She will continue with her efforts towards steady and sustainable weight loss as well as optimization of her metabolic risk factors and limiting her EtOH use. Given that her liver chemistries were normal, she may have these monitored with routine annual labs as per PCP.  We will plan for repeat US elastogrpahy, along with a compelte abd US, in 2025 given hepatic surface nodularity noted on prior ultrasound.  She will follow-up shortly thereafter to discuss results.    Orders:  •  US abdomen complete; Future  •  US elastography/UGAP; Future    Gastroesophageal reflux disease without esophagitis  Patient reported melena, in the setting  of NSAID (meloxicam) use, with an associated 2 g hemoglobin drop. She underwent an EGD (9/23/2024) notable for abnormal mucosa with concentric rings and linear furrows in the esophagus, ring in the GE junction, 4 cm hiatal hernia and blood present in the antrum. Biopsies showed reflux esophagitis. She was prescribed pantoprazole 40 to take twice daily and has since had a repeat EGD notable for LA grade A esophagitis but was otherwise unremarkable. Biopsies again consistent with reflux esophagitis.  No further overt GI bleeding or NSAID use.     Avoid any/all NSAID use.  She may reduce PPI to once daily.  Discussed optimization of PPI and dietary/lifestyle modifications for reflux.    Orders:  •  pantoprazole (PROTONIX) 40 mg tablet; Take 1 tablet (40 mg total) by mouth daily    Melena  Resolved.  Refer to A/P above.       Screening for colon cancer  Patient is up-to-date with CRC screening  Colonoscopy (8/2/2021) notable for one 4 mm polyp, mild diverticulosis and small internal hemorrhoids.  Recommended that she repeat a colonoscopy x 5 years due to a personal history of colonic polyps.         Follow-up in September 2025 after completing US with elastography.      History of Present Illness   HPI    Екатерина Camacho is a 54 y.o. female with PMH significant for obesity, GERD, hypothyroidism, psoriasis, HSV 1 and asthma who presents today for follow-up regarding fatty liver disease and recent EGD.     Interval history  - Last seen 10/9/2024.  - Hepatic function panel (4/6) normal.  - She has lost 3 lbs since her last appointment and is on phentermine.   - EGD (1/22/2025) with grade a esophagitis but otherwise normal. Biopsies c/w reflux esophagitis.      Extended liver history  54 year-old female with history of hypothyroidism and psoriasis who presents for initial evaluation for MASLD. She was diagnosed with fatty liver disease many years ago by imaging. She recently developed RUQ pain and had an US which showed  "hepatomegaly with fatty infiltration and surface nodularity. Her liver tests, synthetic function and platelets are otherwise normal. She denies excessive EtOH use but does note a family history of LYNCH.     She had a complete serologic evaluation which has been unremarkable for viral hepatitis, autoimmune hepatitis and other genetic causes of liver disease. Her labs continue to show normal liver chemistries, normal liver synthetic function and preserved platelet count. She also had a US elastography with F0-F1 fibrosis and S3 steatosis. PCP prescribed Wegovy but insurance denied.       History obtained from: patient    Review of Systems   All other systems reviewed and are negative.    Pertinent Medical History     Medical History Reviewed by provider this encounter:  Tobacco  Allergies  Meds  Problems  Med Hx  Surg Hx  Fam Hx     .  Past Medical History   Past Medical History:   Diagnosis Date   • Acute pain of right shoulder     [id number] RESOLVED//seen by pain management   • Allergic    • Anesthesia     \"sometimes wakes up before procedure done ie, with MRI and EGD\"   • Anxiety     [id number]  LAST ASSESSED   • Arthritis     left knee   • Asthma     allergy induced   • Bulging of intervertebral disc between L4 and L5    • Bursitis of right knee     better now   • Depression    • Ear infection     left PCP placed pt on antibiotic and ear drops   • Elevated AST (SGOT)     [id number] RESOLVED   • GERD (gastroesophageal reflux disease)    • Goiter     [id number] RESOLVED//pt denies states has \"inflamed thyroid\"   • Hiatal hernia    • Hypothyroidism     [id number] RESOLVED//not on meds   • Lipoma     left scapular area   • Lump of axilla, right     [id number] RESOLVED marker clip in rt breast   • Obstructive sleep apnea     [id number] RESOLVED   • Psoriasis    • Rash     under bilat breasts and folds of abdomen,, pt reports Dr Shahana vela and dermatologist is treating/pt using ketoconazole cream as directed   • " "Shortness of breath     \"with exertion\"   • Use of cane as ambulatory aid     occas     Past Surgical History:   Procedure Laterality Date   • CARPAL TUNNEL RELEASE Bilateral    • COLONOSCOPY     • EGD     • HYSTERECTOMY  12/30/2010    WITH ENDOMETERIAL ABLATION   • KNEE SURGERY Left    • NERVE BLOCK      PERIPH NERVE BLOCK WRIST MEDIAN W/LOCAL STEROID INFILTRAIT   • PLANTAR FASCIA RELEASE Bilateral 2009, 2020 R    June 2020 on R   • AR COLONOSCOPY FLX DX W/COLLJ SPEC WHEN PFRMD N/A 08/02/2018    Procedure: EGD AND COLONOSCOPY;  Surgeon: Mulugeta Gill MD;  [facility];  Service: Gastroenterology   • AR EXC B9 LESION MRGN XCP SK TG T/A/L 0.5 CM/< N/A 3/2/2023    Procedure: EXCISION WIDE LESION LEFT UPPER BACK;  Surgeon: Maya Chavez MD;  [facility];  Service: General   • AR FASCIECTOMY PLANTAR FASCIA PARTIAL SPX Right 06/09/2020    Procedure: RELEASE FASCIA PLANTAR/FASCIOTOMY ENDOSCOPIC (EPF);  Surgeon: Viraj Harkins DPM;  [facility];  Service: Podiatry   • AR RELEASE TARSAL TUNNEL Right 08/11/2020    Procedure: RELEASE TARSAL TUNNEL;  Surgeon: Viraj Harkins DPM;  [facility];  Service: Podiatry   • TONSILLECTOMY AND ADENOIDECTOMY       Family History   Problem Relation Age of Onset   • Cirrhosis Mother    • Hypertension Father    • Nephrolithiasis Sister    • Fibroids Sister    • Fibroids Sister    • Fibroids Sister    • Anxiety disorder Daughter    • Asthma Daughter    • Ulcerative colitis Son    • Heart disease Maternal Grandfather    • Ovarian cancer Paternal Grandmother    • Colon cancer Paternal Grandmother    • Uterine cancer Maternal Aunt    • Obesity Family       reports that she has never smoked. She has never been exposed to tobacco smoke. She has never used smokeless tobacco. She reports current alcohol use. She reports that she does not use drugs.  Current Outpatient Medications   Medication Instructions   • albuterol 2.5 mg, Nebulization, Every 4 hours PRN "   • budesonide-formoterol (SYMBICORT) 80-4.5 MCG/ACT inhaler 2 puffs, Inhalation, 2 times daily   • Guselkumab (Tremfya) 100 MG/ML SOAJ Inject under the skin   • levothyroxine (SYNTHROID) 150 mcg, Oral, Daily   • montelukast (SINGULAIR) 10 mg tablet TAKE 1 TABLET BY MOUTH DAILY AT BEDTIME   • multivitamin (THERAGRAN) TABS 1 tablet, Daily   • pantoprazole (PROTONIX) 40 mg, Oral, Daily   • phentermine (ADIPEX-P) 37.5 mg, Oral, Daily   • predniSONE 10 mg tablet Take 3tabs daily for 3days then 2t daily for 3d then 1t daily for 3d   • Proventil  (90 Base) MCG/ACT inhaler 2 puffs, Inhalation, Every 4 hours PRN   • valACYclovir (VALTREX) 500 mg, Oral, Daily     Allergies   Allergen Reactions   • Meloxicam GI Bleeding   • Chlorhexidine Rash      Current Outpatient Medications on File Prior to Visit   Medication Sig Dispense Refill   • albuterol (2.5 mg/3 mL) 0.083 % nebulizer solution Take 3 mL (2.5 mg total) by nebulization every 4 (four) hours as needed for wheezing or shortness of breath 75 mL 1   • budesonide-formoterol (SYMBICORT) 80-4.5 MCG/ACT inhaler Inhale 2 puffs 2 (two) times a day 30.6 g 3   • Guselkumab (Tremfya) 100 MG/ML SOAJ Inject under the skin     • levothyroxine (Synthroid) 150 mcg tablet Take 1 tablet (150 mcg total) by mouth daily 100 tablet 3   • montelukast (SINGULAIR) 10 mg tablet TAKE 1 TABLET BY MOUTH DAILY AT BEDTIME 90 tablet 1   • multivitamin (THERAGRAN) TABS Take 1 tablet by mouth daily Bio- Chem 50     • phentermine (ADIPEX-P) 37.5 MG tablet Take 1 tablet (37.5 mg total) by mouth in the morning 30 tablet 3   • predniSONE 10 mg tablet Take 3tabs daily for 3days then 2t daily for 3d then 1t daily for 3d (Patient taking differently: if needed Take 3tabs daily for 3days then 2t daily for 3d then 1t daily for 3d) 18 tablet 0   • Proventil  (90 Base) MCG/ACT inhaler Inhale 2 puffs every 4 (four) hours as needed for wheezing 20.1 g 5   • valACYclovir (VALTREX) 500 mg tablet Take 1  "tablet (500 mg total) by mouth daily (Patient taking differently: Take 500 mg by mouth if needed) 90 tablet 1   • [DISCONTINUED] pantoprazole (PROTONIX) 40 mg tablet Take 1 tablet (40 mg total) by mouth 2 (two) times a day 180 tablet 1     No current facility-administered medications on file prior to visit.      Social History     Tobacco Use   • Smoking status: Never     Passive exposure: Never   • Smokeless tobacco: Never   Vaping Use   • Vaping status: Never Used   Substance and Sexual Activity   • Alcohol use: Yes     Comment: very rare (1 drink once per month - if that)   • Drug use: No   • Sexual activity: Not on file        Objective   /82   Pulse 74   Ht 5' 3\" (1.6 m)   Wt 133 kg (293 lb)   BMI 51.90 kg/m²      Physical Exam  Vitals and nursing note reviewed.   Constitutional:       General: She is not in acute distress.     Appearance: Normal appearance. She is well-developed. She is not ill-appearing or toxic-appearing.   HENT:      Head: Normocephalic and atraumatic.   Eyes:      General: No scleral icterus.     Conjunctiva/sclera: Conjunctivae normal.   Pulmonary:      Effort: Pulmonary effort is normal.   Abdominal:      General: There is no distension.      Palpations: Abdomen is soft. There is no mass.      Tenderness: There is no abdominal tenderness.   Musculoskeletal:      Right lower leg: No edema.      Left lower leg: No edema.   Skin:     General: Skin is warm and dry.      Coloration: Skin is not jaundiced.      Findings: No bruising or rash.   Neurological:      Mental Status: She is alert and oriented to person, place, and time. Mental status is at baseline.   Psychiatric:         Mood and Affect: Mood normal.         Behavior: Behavior normal.           "

## 2025-04-09 NOTE — PATIENT INSTRUCTIONS
"Pt states she will schedule US Elastography/UGAP & US Abdomen Complete.  Pt given phone number for \"Central Scheduling\" at Pt's OV.  "

## 2025-04-11 ENCOUNTER — RA CDI HCC (OUTPATIENT)
Dept: OTHER | Facility: HOSPITAL | Age: 55
End: 2025-04-11

## 2025-04-11 NOTE — PROGRESS NOTES
HCC coding opportunities       Chart reviewed, no opportunity found: CHART REVIEWED, NO OPPORTUNITY FOUND        Patients Insurance        Commercial Insurance: [employer]: [employer]

## 2025-04-14 DIAGNOSIS — K76.0 FATTY LIVER: Primary | ICD-10-CM

## 2025-04-14 DIAGNOSIS — K21.9 GASTROESOPHAGEAL REFLUX DISEASE WITHOUT ESOPHAGITIS: ICD-10-CM

## 2025-04-17 ENCOUNTER — APPOINTMENT (OUTPATIENT)
Dept: LAB | Facility: HOSPITAL | Age: 55
End: 2025-04-17
Attending: NURSE PRACTITIONER
Payer: COMMERCIAL

## 2025-04-17 DIAGNOSIS — K76.0 FATTY LIVER: ICD-10-CM

## 2025-04-17 DIAGNOSIS — E06.3 HYPOTHYROIDISM DUE TO HASHIMOTO'S THYROIDITIS: ICD-10-CM

## 2025-04-17 DIAGNOSIS — K21.9 GASTROESOPHAGEAL REFLUX DISEASE WITHOUT ESOPHAGITIS: ICD-10-CM

## 2025-04-17 LAB
BASOPHILS # BLD AUTO: 0.05 THOUSANDS/ÂΜL (ref 0–0.1)
BASOPHILS NFR BLD AUTO: 1 % (ref 0–1)
CHOLEST SERPL-MCNC: 170 MG/DL (ref ?–200)
EOSINOPHIL # BLD AUTO: 0.23 THOUSAND/ÂΜL (ref 0–0.61)
EOSINOPHIL NFR BLD AUTO: 3 % (ref 0–6)
ERYTHROCYTE [DISTWIDTH] IN BLOOD BY AUTOMATED COUNT: 13.8 % (ref 11.6–15.1)
HCT VFR BLD AUTO: 42.8 % (ref 34.8–46.1)
HDLC SERPL-MCNC: 55 MG/DL
HGB BLD-MCNC: 13.7 G/DL (ref 11.5–15.4)
IMM GRANULOCYTES # BLD AUTO: 0.01 THOUSAND/UL (ref 0–0.2)
IMM GRANULOCYTES NFR BLD AUTO: 0 % (ref 0–2)
LDLC SERPL CALC-MCNC: 83 MG/DL (ref 0–100)
LYMPHOCYTES # BLD AUTO: 3.19 THOUSANDS/ÂΜL (ref 0.6–4.47)
LYMPHOCYTES NFR BLD AUTO: 45 % (ref 14–44)
MCH RBC QN AUTO: 26.7 PG (ref 26.8–34.3)
MCHC RBC AUTO-ENTMCNC: 32 G/DL (ref 31.4–37.4)
MCV RBC AUTO: 83 FL (ref 82–98)
MONOCYTES # BLD AUTO: 0.6 THOUSAND/ÂΜL (ref 0.17–1.22)
MONOCYTES NFR BLD AUTO: 9 % (ref 4–12)
NEUTROPHILS # BLD AUTO: 3 THOUSANDS/ÂΜL (ref 1.85–7.62)
NEUTS SEG NFR BLD AUTO: 42 % (ref 43–75)
NRBC BLD AUTO-RTO: 0 /100 WBCS
PLATELET # BLD AUTO: 232 THOUSANDS/UL (ref 149–390)
PMV BLD AUTO: 10.3 FL (ref 8.9–12.7)
RBC # BLD AUTO: 5.13 MILLION/UL (ref 3.81–5.12)
TRIGL SERPL-MCNC: 162 MG/DL (ref ?–150)
WBC # BLD AUTO: 7.08 THOUSAND/UL (ref 4.31–10.16)

## 2025-04-17 PROCEDURE — 80061 LIPID PANEL: CPT

## 2025-04-17 PROCEDURE — 36415 COLL VENOUS BLD VENIPUNCTURE: CPT

## 2025-04-17 PROCEDURE — 85025 COMPLETE CBC W/AUTO DIFF WBC: CPT

## 2025-04-18 ENCOUNTER — OFFICE VISIT (OUTPATIENT)
Dept: FAMILY MEDICINE CLINIC | Facility: CLINIC | Age: 55
End: 2025-04-18
Payer: COMMERCIAL

## 2025-04-18 ENCOUNTER — RESULTS FOLLOW-UP (OUTPATIENT)
Dept: FAMILY MEDICINE CLINIC | Facility: CLINIC | Age: 55
End: 2025-04-18

## 2025-04-18 VITALS
RESPIRATION RATE: 17 BRPM | HEART RATE: 90 BPM | TEMPERATURE: 96.4 F | SYSTOLIC BLOOD PRESSURE: 126 MMHG | WEIGHT: 291 LBS | OXYGEN SATURATION: 97 % | BODY MASS INDEX: 51.56 KG/M2 | DIASTOLIC BLOOD PRESSURE: 70 MMHG | HEIGHT: 63 IN

## 2025-04-18 DIAGNOSIS — E66.813 CLASS 3 SEVERE OBESITY DUE TO EXCESS CALORIES WITH SERIOUS COMORBIDITY AND BODY MASS INDEX (BMI) OF 50.0 TO 59.9 IN ADULT: ICD-10-CM

## 2025-04-18 DIAGNOSIS — E06.3 HYPOTHYROIDISM DUE TO HASHIMOTO'S THYROIDITIS: ICD-10-CM

## 2025-04-18 DIAGNOSIS — D50.9 IRON DEFICIENCY ANEMIA, UNSPECIFIED IRON DEFICIENCY ANEMIA TYPE: ICD-10-CM

## 2025-04-18 DIAGNOSIS — E55.9 VITAMIN D DEFICIENCY: ICD-10-CM

## 2025-04-18 DIAGNOSIS — K76.0 FATTY LIVER: ICD-10-CM

## 2025-04-18 DIAGNOSIS — Z12.31 ENCOUNTER FOR SCREENING MAMMOGRAM FOR BREAST CANCER: ICD-10-CM

## 2025-04-18 DIAGNOSIS — J45.20 MILD INTERMITTENT ASTHMA WITHOUT COMPLICATION: Primary | ICD-10-CM

## 2025-04-18 PROCEDURE — 99214 OFFICE O/P EST MOD 30 MIN: CPT | Performed by: NURSE PRACTITIONER

## 2025-04-18 NOTE — ASSESSMENT & PLAN NOTE
Prior Authorization Clinical Questions for Weight Management Pharmacotherapy    2. Does the patient have a diagnosis of obesity, confirmed by a BMI greater than or equal to 30 kg/m^2?: Yes  3. Does the patient have a BMI of greater than or equal to 27 kg/m^2 with at least one weight-related comorbidity/risk factor/complication (e.g. diabetes, dyslipidemia, coronary artery disease)?: Yes  4. Weight-related co-morbidities/risk factors: dyslipidemia, GERD, asthma/reactive airway disease  5. WEGOVY CVA Indication: Does patient have established documented cardiovascular disease (history of a prior heart attack (myocardial infarction), stroke, or symptomatic peripheral arterial disease (PAD)?: No  6. ZEPBOUND NATALIA Indication: Does patient have documented NATALIA diagnosed via sleep study (insurance will require copy of sleep study results for approval)?: No  7. Has the patient been on a weight loss regimen of low-calorie diet, increased physical activity, and lifestyle modifications for a minimum of 6 months?: Yes  8. Has the patient completed a comprehensive weight loss program (ie, Weight Watchers, Noom, Bariatrics, other marvin on phone)? If so, what?: Yes   -- Q8 Further Explanation: weight watchers, noom   9. Does the patient have a history of type 2 diabetes?: No  10. Has the member tried and failed other weight loss medication within the past 12 months?: Yes   -- Q10 Further explanation: phentermine   11. Will the member use requested medication in combination with another GLP agonist or weight loss drug?: No  12. Is the medication a controlled substance?: No     Baseline weight (in pounds): 291 lbs         Orders:    Ambulatory Referral to Nutrition Services; Future

## 2025-04-18 NOTE — PROGRESS NOTES
Name: Екатерина Camacho      : 1970      MRN: [id number]  Encounter Provider: THEO Lewis  Encounter Date: 2025   [facility]    Assessment & Plan  Mild intermittent asthma without complication  Stable  Meds prn           Hypothyroidism due to Hashimoto's thyroiditis  Lowered med  Repeat labs in 3 months           Fatty liver  Low fat diet  Cont meds  Try to get glp-1 approved           Class 3 severe obesity due to excess calories with serious comorbidity and body mass index (BMI) of 50.0 to 59.9 in adult  Prior Authorization Clinical Questions for Weight Management Pharmacotherapy    2. Does the patient have a diagnosis of obesity, confirmed by a BMI greater than or equal to 30 kg/m^2?: Yes  3. Does the patient have a BMI of greater than or equal to 27 kg/m^2 with at least one weight-related comorbidity/risk factor/complication (e.g. diabetes, dyslipidemia, coronary artery disease)?: Yes  4. Weight-related co-morbidities/risk factors: dyslipidemia, GERD, asthma/reactive airway disease  5. WEGOVY CVA Indication: Does patient have established documented cardiovascular disease (history of a prior heart attack (myocardial infarction), stroke, or symptomatic peripheral arterial disease (PAD)?: No  6. ZEPBOUND NATALAI Indication: Does patient have documented NATALIA diagnosed via sleep study (insurance will require copy of sleep study results for approval)?: No  7. Has the patient been on a weight loss regimen of low-calorie diet, increased physical activity, and lifestyle modifications for a minimum of 6 months?: Yes  8. Has the patient completed a comprehensive weight loss program (ie, Weight Watchers, Noom, Bariatrics, other marvin on phone)? If so, what?: Yes   -- Q8 Further Explanation: weight watchers, noom   9. Does the patient have a history of type 2 diabetes?: No  10. Has the member tried and failed other weight loss medication within the past 12 months?: Yes    -- Q10 Further explanation: phentermine   11. Will the member use requested medication in combination with another GLP agonist or weight loss drug?: No  12. Is the medication a controlled substance?: No     Baseline weight (in pounds): 291 lbs         Orders:    Ambulatory Referral to Nutrition Services; Future    Vitamin D deficiency    Orders:    Vitamin D 25 hydroxy; Future    Iron deficiency anemia, unspecified iron deficiency anemia type        Orders:    Iron Panel (Includes Ferritin, Iron Sat%, Iron, and TIBC); Future    Encounter for screening mammogram for breast cancer    Orders:    Mammo screening bilateral w 3d and cad; Future      Depression Screening and Follow-up Plan: Patient was screened for depression during today's encounter. They screened negative with a PHQ-2 score of 0.          History of Present Illness     Here for f/u to chronic medical conditions  Taking mvi daily  Following with liver doctor and doing good      Patient has been on weight loss program for more than 6 months now  Tried weight watchers, noom, eating healthy, mediterranean diet  On phentermine, topamax, etc for more than 6 months now  Has had some weight loss but not to goal  Exercising 150 min or more per week for the past 6 months  Has fatty liver disease  Has asthma        Review of Systems   Constitutional:  Negative for fatigue and fever.   HENT:  Negative for congestion, postnasal drip and rhinorrhea.    Eyes:  Negative for photophobia and visual disturbance.   Respiratory:  Negative for cough, shortness of breath and wheezing.    Cardiovascular:  Negative for chest pain and palpitations.   Gastrointestinal:  Negative for constipation, diarrhea, nausea and vomiting.   Genitourinary:  Negative for dysuria and frequency.   Musculoskeletal:  Negative for arthralgias and myalgias.   Skin:  Negative for rash.   Neurological:  Negative for dizziness and headaches.   Hematological:  Negative for adenopathy.  "  Psychiatric/Behavioral:  Negative for dysphoric mood and sleep disturbance. The patient is not nervous/anxious.      Past Medical History:   Diagnosis Date    Acute pain of right shoulder     [id number] RESOLVED//seen by pain management    Allergic     Anesthesia     \"sometimes wakes up before procedure done ie, with MRI and EGD\"    Anxiety     [id number]  LAST ASSESSED    Arthritis     left knee    Asthma     allergy induced    Bulging of intervertebral disc between L4 and L5     Bursitis of right knee     better now    Depression     Ear infection     left PCP placed pt on antibiotic and ear drops    Elevated AST (SGOT)     [id number] RESOLVED    GERD (gastroesophageal reflux disease)     Goiter     [id number] RESOLVED//pt denies states has \"inflamed thyroid\"    Hiatal hernia     Hypothyroidism     [id number] RESOLVED//not on meds    Lipoma     left scapular area    Lump of axilla, right     [id number] RESOLVED marker clip in rt breast    Obstructive sleep apnea     [id number] RESOLVED    Psoriasis     Rash     under bilat breasts and folds of abdomen,, pt reports Dr Haverrodrigo aware and dermatologist is treating/pt using ketoconazole cream as directed    Shortness of breath     \"with exertion\"    Use of cane as ambulatory aid     occas     Past Surgical History:   Procedure Laterality Date    CARPAL TUNNEL RELEASE Bilateral     COLONOSCOPY      EGD      HYSTERECTOMY  12/30/2010    WITH ENDOMETERIAL ABLATION    KNEE SURGERY Left     NERVE BLOCK      PERIPH NERVE BLOCK WRIST MEDIAN W/LOCAL STEROID INFILTRAIT    PLANTAR FASCIA RELEASE Bilateral 2009, 2020 R    June 2020 on R    NE COLONOSCOPY FLX DX W/COLLJ SPEC WHEN PFRMD N/A 08/02/2018    Procedure: EGD AND COLONOSCOPY;  Surgeon: Mulugeta Gill MD;  [facility];  Service: Gastroenterology    NE EXC B9 LESION MRGN XCP SK TG T/A/L 0.5 CM/< N/A 3/2/2023    Procedure: EXCISION WIDE LESION LEFT UPPER BACK;  Surgeon: Maya Chavez MD;  [facility];  " Service: General    NE FASCIECTOMY PLANTAR FASCIA PARTIAL SPX Right 06/09/2020    Procedure: RELEASE FASCIA PLANTAR/FASCIOTOMY ENDOSCOPIC (EPF);  Surgeon: Viraj Harkisn DPM;  [facility];  Service: Podiatry    NE RELEASE TARSAL TUNNEL Right 08/11/2020    Procedure: RELEASE TARSAL TUNNEL;  Surgeon: Viraj Harkins DPM;  [facility];  Service: Podiatry    TONSILLECTOMY AND ADENOIDECTOMY       Family History   Problem Relation Age of Onset    Cirrhosis Mother     Hypertension Father     Nephrolithiasis Sister     Fibroids Sister     Fibroids Sister     Fibroids Sister     Anxiety disorder Daughter     Asthma Daughter     Ulcerative colitis Son     Heart disease Maternal Grandfather     Ovarian cancer Paternal Grandmother     Colon cancer Paternal Grandmother     Uterine cancer Maternal Aunt     Obesity Family      Social History     Tobacco Use    Smoking status: Never     Passive exposure: Never    Smokeless tobacco: Never   Vaping Use    Vaping status: Never Used   Substance and Sexual Activity    Alcohol use: Yes     Comment: very rare (1 drink once per month - if that)    Drug use: No    Sexual activity: Not on file     Current Outpatient Medications on File Prior to Visit   Medication Sig    albuterol (2.5 mg/3 mL) 0.083 % nebulizer solution Take 3 mL (2.5 mg total) by nebulization every 4 (four) hours as needed for wheezing or shortness of breath    budesonide-formoterol (SYMBICORT) 80-4.5 MCG/ACT inhaler Inhale 2 puffs 2 (two) times a day    Guselkumab (Tremfya) 100 MG/ML SOAJ Inject under the skin    levothyroxine (Synthroid) 150 mcg tablet Take 1 tablet (150 mcg total) by mouth daily    montelukast (SINGULAIR) 10 mg tablet TAKE 1 TABLET BY MOUTH DAILY AT BEDTIME    multivitamin (THERAGRAN) TABS Take 1 tablet by mouth daily Bio- Chem 50    pantoprazole (PROTONIX) 40 mg tablet Take 1 tablet (40 mg total) by mouth daily    phentermine (ADIPEX-P) 37.5 MG tablet Take 1 tablet (37.5 mg total) by  "mouth in the morning    Proventil  (90 Base) MCG/ACT inhaler Inhale 2 puffs every 4 (four) hours as needed for wheezing    valACYclovir (VALTREX) 500 mg tablet Take 1 tablet (500 mg total) by mouth daily    [DISCONTINUED] predniSONE 10 mg tablet Take 3tabs daily for 3days then 2t daily for 3d then 1t daily for 3d (Patient not taking: Reported on 4/18/2025)     Allergies   Allergen Reactions    Chlorhexidine Gluconate Hives    Meloxicam GI Bleeding    Chlorhexidine Rash     Immunization History   Administered Date(s) Administered    COVID-19 J&J (Virgance) vaccine 0.5 mL 03/19/2021    INFLUENZA 12/22/2015, 10/17/2016, 12/04/2017, 11/12/2018    Influenza Quadrivalent Preservative Free 3 years and older IM 12/31/2014, 10/17/2016, 12/04/2017    Influenza Quadrivalent, 6-35 Months IM 12/30/2014, 12/22/2015    Influenza Recombinant Preservative Free Im 10/14/2024    Influenza, injectable, quadrivalent, preservative free 0.5 mL 10/31/2019    Influenza, recombinant, quadrivalent,injectable, preservative free 11/12/2018, 12/08/2020, 12/09/2021, 11/16/2022    Influenza, seasonal, injectable 10/20/2012, 12/08/2021, 12/08/2021    Tdap 08/30/2012     Objective   /70 (BP Location: Left arm, Patient Position: Sitting, Cuff Size: Large)   Pulse 90   Temp (!) 96.4 °F (35.8 °C) (Tympanic)   Resp 17   Ht 5' 3\" (1.6 m)   Wt 132 kg (291 lb)   SpO2 97%   BMI 51.55 kg/m²     Physical Exam  Vitals and nursing note reviewed.   Constitutional:       Appearance: Normal appearance.   HENT:      Head: Normocephalic and atraumatic.      Right Ear: Tympanic membrane, ear canal and external ear normal.      Left Ear: Tympanic membrane, ear canal and external ear normal.      Nose: Nose normal.      Mouth/Throat:      Mouth: Mucous membranes are moist.   Eyes:      Conjunctiva/sclera: Conjunctivae normal.   Cardiovascular:      Rate and Rhythm: Normal rate and regular rhythm.      Heart sounds: Normal heart sounds.   Pulmonary: "      Effort: Pulmonary effort is normal.      Breath sounds: Normal breath sounds.   Abdominal:      General: Bowel sounds are normal.      Palpations: Abdomen is soft.   Musculoskeletal:         General: Normal range of motion.      Cervical back: Normal range of motion and neck supple.   Skin:     General: Skin is warm and dry.      Capillary Refill: Capillary refill takes less than 2 seconds.   Neurological:      General: No focal deficit present.      Mental Status: She is alert and oriented to person, place, and time.   Psychiatric:         Mood and Affect: Mood normal.         Behavior: Behavior normal.         Thought Content: Thought content normal.         Judgment: Judgment normal.

## 2025-04-25 ENCOUNTER — TELEPHONE (OUTPATIENT)
Age: 55
End: 2025-04-25

## 2025-04-25 NOTE — TELEPHONE ENCOUNTER
[employer] calling to see if forms were received. Made aware they were scanned in today and to allow 10-14 business days for completion

## 2025-04-29 ENCOUNTER — APPOINTMENT (OUTPATIENT)
Dept: LAB | Facility: HOSPITAL | Age: 55
End: 2025-04-29
Attending: SURGERY
Payer: COMMERCIAL

## 2025-04-29 DIAGNOSIS — M67.40 MUCOID CYST OF JOINT: ICD-10-CM

## 2025-04-29 DIAGNOSIS — Z01.812 PRE-PROCEDURE LAB EXAM: ICD-10-CM

## 2025-04-29 DIAGNOSIS — D50.9 IRON DEFICIENCY ANEMIA, UNSPECIFIED IRON DEFICIENCY ANEMIA TYPE: ICD-10-CM

## 2025-04-29 DIAGNOSIS — M67.40 GANGLION CYST: ICD-10-CM

## 2025-04-29 DIAGNOSIS — M65.331 TRIGGER FINGER, RIGHT MIDDLE FINGER: ICD-10-CM

## 2025-04-29 DIAGNOSIS — E55.9 VITAMIN D DEFICIENCY: ICD-10-CM

## 2025-04-29 DIAGNOSIS — M65.311 TRIGGER THUMB OF RIGHT HAND: ICD-10-CM

## 2025-04-29 LAB
25(OH)D3 SERPL-MCNC: 21.8 NG/ML (ref 30–100)
ANION GAP SERPL CALCULATED.3IONS-SCNC: 9 MMOL/L (ref 4–13)
BUN SERPL-MCNC: 21 MG/DL (ref 5–25)
CALCIUM SERPL-MCNC: 9.4 MG/DL (ref 8.4–10.2)
CHLORIDE SERPL-SCNC: 106 MMOL/L (ref 96–108)
CO2 SERPL-SCNC: 23 MMOL/L (ref 21–32)
CREAT SERPL-MCNC: 0.78 MG/DL (ref 0.6–1.3)
FERRITIN SERPL-MCNC: 34 NG/ML (ref 30–307)
GFR SERPL CREATININE-BSD FRML MDRD: 86 ML/MIN/1.73SQ M
GLUCOSE P FAST SERPL-MCNC: 108 MG/DL (ref 65–99)
IRON SATN MFR SERPL: 17 % (ref 15–50)
IRON SERPL-MCNC: 62 UG/DL (ref 50–212)
POTASSIUM SERPL-SCNC: 3.9 MMOL/L (ref 3.5–5.3)
SODIUM SERPL-SCNC: 138 MMOL/L (ref 135–147)
TIBC SERPL-MCNC: 369.6 UG/DL (ref 250–450)
TRANSFERRIN SERPL-MCNC: 264 MG/DL (ref 203–362)
UIBC SERPL-MCNC: 308 UG/DL (ref 155–355)

## 2025-04-29 PROCEDURE — 83550 IRON BINDING TEST: CPT

## 2025-04-29 PROCEDURE — 36415 COLL VENOUS BLD VENIPUNCTURE: CPT

## 2025-04-29 PROCEDURE — 80048 BASIC METABOLIC PNL TOTAL CA: CPT

## 2025-04-29 PROCEDURE — 82306 VITAMIN D 25 HYDROXY: CPT

## 2025-04-29 PROCEDURE — 82728 ASSAY OF FERRITIN: CPT

## 2025-04-29 PROCEDURE — 83540 ASSAY OF IRON: CPT

## 2025-04-30 ENCOUNTER — OFFICE VISIT (OUTPATIENT)
Dept: OBGYN CLINIC | Facility: CLINIC | Age: 55
End: 2025-04-30
Payer: COMMERCIAL

## 2025-04-30 VITALS — BODY MASS INDEX: 51.56 KG/M2 | HEIGHT: 63 IN | WEIGHT: 291 LBS

## 2025-04-30 DIAGNOSIS — M19.031 ARTHRITIS OF RIGHT WRIST: Primary | ICD-10-CM

## 2025-04-30 DIAGNOSIS — M19.041 ARTHRITIS OF RIGHT HAND: ICD-10-CM

## 2025-04-30 DIAGNOSIS — M65.311 TRIGGER THUMB OF RIGHT HAND: ICD-10-CM

## 2025-04-30 DIAGNOSIS — M65.331 TRIGGER FINGER, RIGHT MIDDLE FINGER: ICD-10-CM

## 2025-04-30 DIAGNOSIS — M67.40 MUCOID CYST OF JOINT: ICD-10-CM

## 2025-04-30 PROCEDURE — 99214 OFFICE O/P EST MOD 30 MIN: CPT | Performed by: SURGERY

## 2025-04-30 NOTE — LETTER
April 30, 2025     Patient: Екатерина Camacho  YOB: 1970  Date of Visit: 4/30/2025      To Whom it May Concern:    Екатерина Camacho is under my professional care. Екатерина was seen in my office on 4/30/2025. Екатерина will be undergoing surgery on 5/30/25. She will be out of work for 1 month post operatively.     If you have any questions or concerns, please don't hesitate to call.         Sincerely,          Baljit Abdi MD

## 2025-04-30 NOTE — PROGRESS NOTES
ASSESSMENT/PLAN:      Assessment & Plan  Arthritis of right wrist  Previous hand x-rays were reviewed.   It was discussed with Екатерина that she is having tenderness to her thumb CMC joint, pisotriquetral joint and wrist DRUJ likely due to underlying arthritis.   Advised to use heat over the hand and wrist. She may continue the use of the topical lidocaine cream.   We discussed palm burning may be secondary to her underlying arthritis, but hard to tell. She has a history of a carpal tunnel release without positive provocative finding on PE.   If pain worsens or fails to improve, we discussed the possibility of localized joint CSI's.        Arthritis of right hand  Previous hand x-rays were reviewed.   It was discussed with Екатерина that she is having tenderness to her thumb CMC joint, pisotriquetral joint and wrist DRUJ likely due to underlying arthritis.   Advised to use heat over the hand and wrist. She may continue the use of the topical lidocaine cream.   We discussed palm burning may be secondary to her underlying arthritis, but hard to tell. She has a history of a carpal tunnel release without positive provocative finding on PE.   If pain worsens or fails to improve, we discussed the possibility of localized joint CSI's.        Mucoid cyst of joint  She is scheduled to undergo a right thumb mucoid cyst excision, IP joint arthrotomy/synovectomy, ostectomy and right thumb and long finger trigger finger releases on 5/30/25  Patient requesting to attend OT post operatively for strengthening exercises. She may start OT 5 days post op and OT script was provided.   Patient works as a health/phys , discussed 1 month off of work. Message was sent to the leave team regarding her paperwork. Note was also provided.   Orders:    Ambulatory Referral to PT/OT Hand Therapy; Future    Trigger finger, right middle finger  She is scheduled to undergo a right thumb mucoid cyst excision, IP joint arthrotomy/synovectomy,  ostectomy and right thumb and long finger trigger finger releases on 5/30/25  Patient requesting to attend OT post operatively for strengthening exercises. She may start OT 5 days post op and OT script was provided.   Patient works as a health/phys , discussed 1 month off of work. Message was sent to the leave team regarding her paperwork. Note was also provided.   Orders:    Ambulatory Referral to PT/OT Hand Therapy; Future    Trigger thumb of right hand  She is scheduled to undergo a right thumb mucoid cyst excision, IP joint arthrotomy/synovectomy, ostectomy and right thumb and long finger trigger finger releases on 5/30/25  Patient requesting to attend OT post operatively for strengthening exercises. She may start OT 5 days post op and OT script was provided.   Patient works as a health/phys , discussed 1 month off of work. Message was sent to the leave team regarding her paperwork. Note was also provided.   Orders:    Ambulatory Referral to PT/OT Hand Therapy; Future      The patient verbalized understanding of exam findings and treatment plan. We engaged in the shared decision-making process and treatment options were discussed at length with the patient. Surgical and conservative management discussed today along with risks and benefits.    Diagnoses and all orders for this visit:    Arthritis of right wrist    Arthritis of right hand    Mucoid cyst of joint  -     Ambulatory Referral to PT/OT Hand Therapy; Future    Trigger finger, right middle finger  -     Ambulatory Referral to PT/OT Hand Therapy; Future    Trigger thumb of right hand  -     Ambulatory Referral to PT/OT Hand Therapy; Future      Follow Up:  Return if symptoms worsen or fail to improve.      To Do Next Visit:  Re-evaluation of current issue    ____________________________________________________________________________________________________________________________________________      CHIEF COMPLAINT:  Chief Complaint  "  Patient presents with    Right Hand - Pain     Burning pain in palm, more discomfort in wrist.  Thumb is locking       SUBJECTIVE:  Екатерина Camacho is a 54 y.o. year old RHD female who presents to the office for right hand/wrist pain. She notes a burning pain to her palm. The burning pain does not radiate into her fingers. She has a history of a right carpal tunnel release around 2009. She also notes pain to the dorsal ulnar aspect of her wrist, which will worsen with use. The pain has been waking her from sleep at night. She also notes her thumb has been locking more. She is scheduled to undergo a right thumb mucoid cyst excision, IP arthrotomy/synovectomy, ostectomy and right thumb and long finger trigger finger releases on 5/30/25. She has been using a heat/lidocaine cream as needed, which has been beneficial for her.      Occupation: fer/phys         I have personally reviewed all the relevant PMH, PSH, SH, FH, Medications and allergies.     PAST MEDICAL HISTORY:  Past Medical History:   Diagnosis Date    Acute pain of right shoulder     [id number] RESOLVED//seen by pain management    Allergic     Anesthesia     \"sometimes wakes up before procedure done ie, with MRI and EGD\"    Anxiety     [id number]  LAST ASSESSED    Arthritis     left knee    Asthma     allergy induced    Bulging of intervertebral disc between L4 and L5     Bursitis of right knee     better now    Depression     Ear infection     left PCP placed pt on antibiotic and ear drops    Elevated AST (SGOT)     [id number] RESOLVED    GERD (gastroesophageal reflux disease)     Goiter     [id number] RESOLVED//pt denies states has \"inflamed thyroid\"    Hiatal hernia     Hypothyroidism     [id number] RESOLVED//not on meds    Lipoma     left scapular area    Lump of axilla, right     [id number] RESOLVED marker clip in rt breast    Obstructive sleep apnea     [id number] RESOLVED    Psoriasis     Rash     under bilat breasts and folds of abdomen,, pt " "reports Dr Harkins aware and dermatologist is treating/pt using ketoconazole cream as directed    Shortness of breath     \"with exertion\"    Use of cane as ambulatory aid     occas       PAST SURGICAL HISTORY:  Past Surgical History:   Procedure Laterality Date    CARPAL TUNNEL RELEASE Bilateral     COLONOSCOPY      EGD      HYSTERECTOMY  12/30/2010    WITH ENDOMETERIAL ABLATION    KNEE SURGERY Left     NERVE BLOCK      PERIPH NERVE BLOCK WRIST MEDIAN W/LOCAL STEROID INFILTRAIT    PLANTAR FASCIA RELEASE Bilateral 2009, 2020 R    June 2020 on R    AK COLONOSCOPY FLX DX W/COLLJ SPEC WHEN PFRMD N/A 08/02/2018    Procedure: EGD AND COLONOSCOPY;  Surgeon: Mulugeta Gill MD;  [facility];  Service: Gastroenterology    AK EXC B9 LESION MRGN XCP SK TG T/A/L 0.5 CM/< N/A 3/2/2023    Procedure: EXCISION WIDE LESION LEFT UPPER BACK;  Surgeon: Maya Chavez MD;  [facility];  Service: General    AK FASCIECTOMY PLANTAR FASCIA PARTIAL SPX Right 06/09/2020    Procedure: RELEASE FASCIA PLANTAR/FASCIOTOMY ENDOSCOPIC (EPF);  Surgeon: Viraj Harkins DPM;  [facility];  Service: Podiatry    AK RELEASE TARSAL TUNNEL Right 08/11/2020    Procedure: RELEASE TARSAL TUNNEL;  Surgeon: Viraj Harkins DPM;  [facility];  Service: Podiatry    TONSILLECTOMY AND ADENOIDECTOMY         FAMILY HISTORY:  Family History   Problem Relation Age of Onset    Cirrhosis Mother     Hypertension Father     Nephrolithiasis Sister     Fibroids Sister     Fibroids Sister     Fibroids Sister     Anxiety disorder Daughter     Asthma Daughter     Ulcerative colitis Son     Heart disease Maternal Grandfather     Ovarian cancer Paternal Grandmother     Colon cancer Paternal Grandmother     Uterine cancer Maternal Aunt     Obesity Family        SOCIAL HISTORY:  Social History     Tobacco Use    Smoking status: Never     Passive exposure: Never    Smokeless tobacco: Never   Vaping Use    Vaping status: Never Used   Substance Use " Topics    Alcohol use: Yes     Comment: very rare (1 drink once per month - if that)    Drug use: No       MEDICATIONS:    Current Outpatient Medications:     albuterol (2.5 mg/3 mL) 0.083 % nebulizer solution, Take 3 mL (2.5 mg total) by nebulization every 4 (four) hours as needed for wheezing or shortness of breath, Disp: 75 mL, Rfl: 1    budesonide-formoterol (SYMBICORT) 80-4.5 MCG/ACT inhaler, Inhale 2 puffs 2 (two) times a day, Disp: 30.6 g, Rfl: 3    Guselkumab (Tremfya) 100 MG/ML SOAJ, Inject under the skin, Disp: , Rfl:     levothyroxine (Synthroid) 150 mcg tablet, Take 1 tablet (150 mcg total) by mouth daily, Disp: 100 tablet, Rfl: 3    montelukast (SINGULAIR) 10 mg tablet, TAKE 1 TABLET BY MOUTH DAILY AT BEDTIME, Disp: 90 tablet, Rfl: 1    multivitamin (THERAGRAN) TABS, Take 1 tablet by mouth daily Bio- Chem 50, Disp: , Rfl:     pantoprazole (PROTONIX) 40 mg tablet, Take 1 tablet (40 mg total) by mouth daily, Disp: , Rfl:     phentermine (ADIPEX-P) 37.5 MG tablet, Take 1 tablet (37.5 mg total) by mouth in the morning, Disp: 30 tablet, Rfl: 3    Proventil  (90 Base) MCG/ACT inhaler, Inhale 2 puffs every 4 (four) hours as needed for wheezing, Disp: 20.1 g, Rfl: 5    valACYclovir (VALTREX) 500 mg tablet, Take 1 tablet (500 mg total) by mouth daily, Disp: 90 tablet, Rfl: 1    ALLERGIES:  Allergies   Allergen Reactions    Chlorhexidine Gluconate Hives    Meloxicam GI Bleeding    Chlorhexidine Rash       REVIEW OF SYSTEMS:  Review of Systems   Constitutional:  Negative for chills, fever and unexpected weight change.   HENT:  Negative for hearing loss, nosebleeds and sore throat.    Eyes:  Negative for pain, redness and visual disturbance.   Respiratory:  Negative for cough, shortness of breath and wheezing.    Cardiovascular:  Negative for chest pain, palpitations and leg swelling.   Gastrointestinal:  Negative for abdominal pain, nausea and vomiting.   Endocrine: Negative for polydipsia and polyuria.    Genitourinary:  Negative for difficulty urinating and hematuria.   Musculoskeletal:  Negative for arthralgias, joint swelling and myalgias.   Skin:  Negative for rash and wound.   Neurological:  Negative for dizziness, numbness and headaches.   Psychiatric/Behavioral:  Negative for decreased concentration, dysphoric mood and suicidal ideas. The patient is not nervous/anxious.        VITALS:  There were no vitals filed for this visit.      _____________________________________________________  PHYSICAL EXAMINATION:  General: well developed and well nourished, alert, oriented times 3, and appears comfortable  Psychiatric: Normal  HEENT: Normocephalic, Atraumatic Trachea Midline, No torticollis  Pulmonary: No audible wheezing or respiratory distress   Cardiovascular: No pitting edema, 2+ radial pulse   Abdominal/GI: abdomen non tender, non distended   Skin: No masses, erythema, lacerations, fluctation, ulcerations  Neurovascular: Sensation Intact to the Median, Ulnar, Radial Nerve, Motor Intact to the Median, Ulnar, Radial Nerve, and Pulses Intact  Musculoskeletal: Normal, except as noted in detailed exam and in HPI.      MUSCULOSKELETAL EXAMINATION:    Right hand/wrist:     No erythema, ecchymosis or edema  Tinel's at the wrist causes pain to the long finger   CMC tenderness   + metacarpal load shift test   Mild pisotriquetral joint tenderness  DRUJ tenderness    Full digital extension   Full composite fist     ___________________________________________________    STUDIES REVIEWED:  I have personally reviewed and interpreted  AP lateral and oblique radiographs of right hand which demonstrate mild degenerative changes seen in the radiocarpal joint and CMC on previous hand x-ray      LABS REVIEWED:    HgA1c:   Lab Results   Component Value Date    HGBA1C 6.2 (H) 07/28/2014     BMP:   Lab Results   Component Value Date    GLUCOSE 106 12/12/2015    CALCIUM 9.4 04/29/2025     12/12/2015    K 3.9 04/29/2025    CO2  23 04/29/2025     04/29/2025    BUN 21 04/29/2025    CREATININE 0.78 04/29/2025             PROCEDURES PERFORMED:  Procedures  No Procedures performed today    _____________________________________________________      Scribe Attestation      I,:  Cheryle Loza MA am acting as a scribe while in the presence of the attending physician.:       I,:  Baljit Abdi MD personally performed the services described in this documentation    as scribed in my presence.:

## 2025-05-15 ENCOUNTER — CONSULT (OUTPATIENT)
Dept: FAMILY MEDICINE CLINIC | Facility: CLINIC | Age: 55
End: 2025-05-15
Payer: COMMERCIAL

## 2025-05-15 VITALS
HEART RATE: 87 BPM | OXYGEN SATURATION: 97 % | RESPIRATION RATE: 20 BRPM | SYSTOLIC BLOOD PRESSURE: 128 MMHG | WEIGHT: 293 LBS | DIASTOLIC BLOOD PRESSURE: 84 MMHG | HEIGHT: 63 IN | TEMPERATURE: 97.4 F | BODY MASS INDEX: 51.91 KG/M2

## 2025-05-15 DIAGNOSIS — E06.3 HYPOTHYROIDISM DUE TO HASHIMOTO'S THYROIDITIS: ICD-10-CM

## 2025-05-15 DIAGNOSIS — J45.20 MILD INTERMITTENT ASTHMA WITHOUT COMPLICATION: ICD-10-CM

## 2025-05-15 DIAGNOSIS — L40.9 PSORIASIS: ICD-10-CM

## 2025-05-15 DIAGNOSIS — M54.50 CHRONIC BILATERAL LOW BACK PAIN WITHOUT SCIATICA: ICD-10-CM

## 2025-05-15 DIAGNOSIS — M67.40 GANGLION OF TENDON SHEATH: ICD-10-CM

## 2025-05-15 DIAGNOSIS — Z01.818 PRE-OP TESTING: ICD-10-CM

## 2025-05-15 DIAGNOSIS — E66.813 CLASS 3 SEVERE OBESITY DUE TO EXCESS CALORIES WITH SERIOUS COMORBIDITY AND BODY MASS INDEX (BMI) OF 50.0 TO 59.9 IN ADULT: ICD-10-CM

## 2025-05-15 DIAGNOSIS — K21.9 GASTROESOPHAGEAL REFLUX DISEASE WITHOUT ESOPHAGITIS: ICD-10-CM

## 2025-05-15 DIAGNOSIS — K76.0 FATTY LIVER: ICD-10-CM

## 2025-05-15 DIAGNOSIS — Z01.818 PREOP EXAMINATION: Primary | ICD-10-CM

## 2025-05-15 DIAGNOSIS — G89.29 CHRONIC BILATERAL LOW BACK PAIN WITHOUT SCIATICA: ICD-10-CM

## 2025-05-15 PROCEDURE — 93000 ELECTROCARDIOGRAM COMPLETE: CPT | Performed by: NURSE PRACTITIONER

## 2025-05-15 PROCEDURE — 99214 OFFICE O/P EST MOD 30 MIN: CPT | Performed by: NURSE PRACTITIONER

## 2025-05-15 NOTE — PROGRESS NOTES
Pre-operative Clearance  Name: Екатерина Camacho      : 1970      MRN: [id number]  Encounter Provider: THEO Lewis  Encounter Date: 5/15/2025   Encounter department: [facility]    :  Assessment & Plan  Preop examination         Pre-op testing    Orders:    POCT ECG    Ganglion of tendon sheath         Mild intermittent asthma without complication  Stable  Meds prn         Fatty liver  Stable  Low fat diet         Gastroesophageal reflux disease without esophagitis  Stable  Cont meds         Hypothyroidism due to Hashimoto's thyroiditis  Stable  Cont meds  Tsh in 8 to 12 weeks         Psoriasis  Stable  Cont meds         Class 3 severe obesity due to excess calories with serious comorbidity and body mass index (BMI) of 50.0 to 59.9 in adult    Low fat low calorie diet  Exercise         Chronic bilateral low back pain without sciatica    Orders:    tiZANidine (ZANAFLEX) 4 mg tablet; Take 1 tablet (4 mg total) by mouth every 8 (eight) hours as needed for muscle spasms        Pre-operative Clearance:     Revised Cardiac Risk Index:  RCI RISK CLASS I (0 risk factors, risk of major cardiac complications approximately 0.5%)    Clearance:  Patient is medically optimized (CLEARED) for proposed surgery without any additional cardiac testing.      Medication Instructions:   - Avoid herbs or non-directed vitamins one week prior to surgery    - Avoid aspirin containing medications or non-steroidal anti-inflammatory drugs one week preceding surgery    - May take tylenol for pain up until the night before surgery    - Acyclovir: Continue to take this medication on your normal schedule.  - Alpha-2 Adrenergic Agonist (ie, clonidine, tizanidine, methyldopa): Continue to take this medication on your normal schedule.  - Leukotriene Inhibitor (ie, montelukast, zafirlucast): Continue to take this medication on your normal schedule.  - Phentermine: Stop taking medication 5 days prior to surgery.  - Thyroid  meds: Continue to take this medication on your normal schedule.      Depression Screening and Follow-up Plan: Patient was screened for depression during today's encounter. They screened negative with a PHQ-2 score of 0.        History of Present Illness     Pre-Op Examination     Surgery: right hand surgery, eXCISION MUCOID CYST, IP ARTHROTOMY SYNOVECTOMY, OSTECTOMY (Right: Thumb)      RELEASE TRIGGER FINGER - THUMB AND MIDDLE (Right: Hand   Anticipated Date of Surgery: 5/30/2025   Surgeon: Dr Eagle Abdi     Hand pain right       Previous history of bleeding disorders or clots?: No    Previous Anesthesia reaction?: Yes    Prolonged steroid use in the last 6 months?: No      Assessment of Cardiac Risk:   - Unstable or severe angina or MI in the last 6 weeks or history of stent placement in the last year?: No    - Decompensated heart failure (e.g. New onset heart failure, NYHA  Class IV heart failure, or worsening existing heart failure)?: No    - Significant arrhythmias such as high grade AV block, symptomatic ventricular arrhythmia, newly recognized ventricular tachycardia, supraventricular tachycardia with resting heart rate >100, or symptomatic bradycardia?: No    - Severe heart valve disease including aortic stenosis or symptomatic mitral stenosis?: No      Pre-operative Risk Factors:  - Elevated-risk surgery: No    - History of cerebrovascular disease: No    - History of ischemic heart disease: No    - History of congestive heart failure: No    - Pre-operative treatment with insulin: No    - Pre-operative creatinine >2 mg/dL: No      Medications of Perioperative Concern:        Awakens during surgery or procedures       Review of Systems   Constitutional:  Negative for fatigue and fever.   HENT:  Negative for congestion, postnasal drip and rhinorrhea.    Eyes:  Negative for photophobia and visual disturbance.   Respiratory:  Negative for cough, shortness of breath and wheezing.    Cardiovascular:  Negative for  "chest pain and palpitations.   Gastrointestinal:  Negative for constipation, diarrhea, nausea and vomiting.   Genitourinary:  Negative for dysuria and frequency.   Musculoskeletal:  Positive for arthralgias and myalgias.   Skin:  Negative for rash.   Neurological:  Negative for dizziness, light-headedness and headaches.   Hematological:  Negative for adenopathy.   Psychiatric/Behavioral:  Negative for dysphoric mood and sleep disturbance. The patient is not nervous/anxious.      Past Medical History   Past Medical History:   Diagnosis Date    Acute pain of right shoulder     [id number] RESOLVED//seen by pain management    Allergic     Anesthesia     \"sometimes wakes up before procedure done ie, with MRI and EGD\"    Anxiety     [id number]  LAST ASSESSED    Arthritis     left knee    Asthma     allergy induced    Bulging of intervertebral disc between L4 and L5     Bursitis of right knee     better now    Depression     Ear infection     left PCP placed pt on antibiotic and ear drops    Elevated AST (SGOT)     [id number] RESOLVED    GERD (gastroesophageal reflux disease)     Goiter     [id number] RESOLVED//pt denies states has \"inflamed thyroid\"    Hiatal hernia     Hypothyroidism     [id number] RESOLVED//not on meds    Lipoma     left scapular area    Lump of axilla, right     [id number] RESOLVED marker clip in rt breast    Obstructive sleep apnea     [id number] RESOLVED    Psoriasis     Rash     under bilat breasts and folds of abdomen,, pt reports Dr Shahana vela and dermatologist is treating/pt using ketoconazole cream as directed    Shortness of breath     \"with exertion\"    Use of cane as ambulatory aid     occas     Past Surgical History:   Procedure Laterality Date    CARPAL TUNNEL RELEASE Bilateral     COLONOSCOPY      EGD      HYSTERECTOMY  12/30/2013    WITH ENDOMETERIAL ABLATION    KNEE SURGERY Left     NERVE BLOCK      PERIPH NERVE BLOCK WRIST MEDIAN W/LOCAL STEROID INFILTRAIT    PLANTAR FASCIA RELEASE " "Bilateral 2009, 2020 R    June 2020 on R    KY COLONOSCOPY FLX DX W/COLLJ SPEC WHEN PFRMD N/A 08/02/2018    Procedure: EGD AND COLONOSCOPY;  Surgeon: Mulugeta Gill MD;  Location: [facility];  Service: Gastroenterology    KY EXC B9 LESION MRGN XCP SK TG T/A/L 0.5 CM/< N/A 03/02/2023    Procedure: EXCISION WIDE LESION LEFT UPPER BACK;  Surgeon: Maya Chavez MD;  Location: [facility];  Service: General    KY FASCIECTOMY PLANTAR FASCIA PARTIAL SPX Right 06/09/2020    Procedure: RELEASE FASCIA PLANTAR/FASCIOTOMY ENDOSCOPIC (EPF);  Surgeon: Viraj Harkins DPM;  Location[facility];  Service: Podiatry    KY RELEASE TARSAL TUNNEL Right 08/11/2020    Procedure: RELEASE TARSAL TUNNEL;  Surgeon: Viraj Harkins DPM;  [facility];  Service: Podiatry    TONSILLECTOMY AND ADENOIDECTOMY       Family History   Problem Relation Age of Onset    Cirrhosis Mother     Hypertension Father     Nephrolithiasis Sister     Fibroids Sister     Fibroids Sister     Fibroids Sister     Anxiety disorder Daughter     Asthma Daughter     Ulcerative colitis Son     Heart disease Maternal Grandfather     Ovarian cancer Paternal Grandmother     Colon cancer Paternal Grandmother     Uterine cancer Maternal Aunt     Obesity Family      Social History     Tobacco Use    Smoking status: Never     Passive exposure: Never    Smokeless tobacco: Never   Vaping Use    Vaping status: Never Used   Substance and Sexual Activity    Alcohol use: Yes     Comment: very rare (1 drink once per month - if that)    Drug use: No    Sexual activity: Not on file     Medications[1]  Allergies   Allergen Reactions    Chlorhexidine Gluconate Hives    Meloxicam GI Bleeding    Chlorhexidine Rash     Objective   /84 (BP Location: Left arm, Patient Position: Sitting, Cuff Size: Large)   Pulse 87   Temp (!) 97.4 °F (36.3 °C) (Temporal)   Resp 20   Ht 5' 3\" (1.6 m)   Wt 134 kg (294 lb 6.4 oz)   SpO2 97%   BMI 52.15 kg/m²     Physical Exam  Vitals and " nursing note reviewed.   Constitutional:       Appearance: Normal appearance.   HENT:      Head: Normocephalic and atraumatic.      Right Ear: Tympanic membrane, ear canal and external ear normal.      Left Ear: Tympanic membrane, ear canal and external ear normal.      Nose: Nose normal.      Mouth/Throat:      Mouth: Mucous membranes are moist.     Eyes:      Conjunctiva/sclera: Conjunctivae normal.       Cardiovascular:      Rate and Rhythm: Normal rate and regular rhythm.      Heart sounds: Normal heart sounds.   Pulmonary:      Effort: Pulmonary effort is normal.      Breath sounds: Normal breath sounds.     Musculoskeletal:         General: Normal range of motion.      Cervical back: Normal range of motion and neck supple.     Skin:     General: Skin is warm and dry.      Capillary Refill: Capillary refill takes less than 2 seconds.     Neurological:      General: No focal deficit present.      Mental Status: She is alert and oriented to person, place, and time.     Psychiatric:         Mood and Affect: Mood normal.         Behavior: Behavior normal.         Thought Content: Thought content normal.         Judgment: Judgment normal.           THEO Lewis         [1]   Current Outpatient Medications on File Prior to Visit   Medication Sig    albuterol (2.5 mg/3 mL) 0.083 % nebulizer solution Take 3 mL (2.5 mg total) by nebulization every 4 (four) hours as needed for wheezing or shortness of breath    budesonide-formoterol (SYMBICORT) 80-4.5 MCG/ACT inhaler Inhale 2 puffs 2 (two) times a day    Guselkumab (Tremfya) 100 MG/ML SOAJ Inject under the skin    levothyroxine (Synthroid) 150 mcg tablet Take 1 tablet (150 mcg total) by mouth daily    montelukast (SINGULAIR) 10 mg tablet TAKE 1 TABLET BY MOUTH DAILY AT BEDTIME    multivitamin (THERAGRAN) TABS Take 1 tablet by mouth in the morning. Bio- Chem 50 .    pantoprazole (PROTONIX) 40 mg tablet Take 1 tablet (40 mg total) by mouth daily    phentermine  (ADIPEX-P) 37.5 MG tablet Take 1 tablet (37.5 mg total) by mouth in the morning    Proventil  (90 Base) MCG/ACT inhaler Inhale 2 puffs every 4 (four) hours as needed for wheezing    valACYclovir (VALTREX) 500 mg tablet Take 1 tablet (500 mg total) by mouth daily      English

## 2025-05-20 ENCOUNTER — TELEPHONE (OUTPATIENT)
Dept: OBGYN CLINIC | Facility: CLINIC | Age: 55
End: 2025-05-20

## 2025-05-20 ENCOUNTER — TELEPHONE (OUTPATIENT)
Age: 55
End: 2025-05-20

## 2025-05-20 NOTE — TELEPHONE ENCOUNTER
[facility] calling and patient had ekg done in office on 15th BUT the tracing did not go across - needs to be rescanned/resent so it can be read.  The patient has surgery on the 30th

## 2025-06-06 DIAGNOSIS — E66.813 CLASS 3 SEVERE OBESITY DUE TO EXCESS CALORIES WITH SERIOUS COMORBIDITY AND BODY MASS INDEX (BMI) OF 50.0 TO 59.9 IN ADULT: Primary | ICD-10-CM

## 2025-06-09 ENCOUNTER — TELEPHONE (OUTPATIENT)
Dept: FAMILY MEDICINE CLINIC | Facility: CLINIC | Age: 55
End: 2025-06-09

## 2025-06-10 NOTE — TELEPHONE ENCOUNTER
PA for     Semaglutide-Weight Management (WEGOVY) 0.25 MG/0.5ML   SUBMITTED to [employer]    via    [x]CMM-KEY: [id number]  []Surescripts-Case ID #    []Availity-Auth ID #  NDC #    []Faxed to plan   []Other website    []Phone call Case ID #      [x]PA sent as URGENT    All office notes, labs and other pertaining documents and studies sent. Clinical questions answered. Awaiting determination from insurance company.     Turnaround time for your insurance to make a decision on your Prior Authorization can take 7-21 business days.

## 2025-06-11 NOTE — TELEPHONE ENCOUNTER
PA for     Semaglutide-Weight Management (WEGOVY) 0.25 MG/0.5ML   DENIED    Reason:(Screenshot if applicable)      Message sent to office clinical pool Yes    Denial letter scanned into Media Yes    We can gladly do an appeal but the process can take about 30-60 days to provide determination. Please have the office staff schedule a Peer to Peer at phone [phone]. If an appeal is truly warranted please have Provider send clinical documentation to the PA department to support the appeal.     **Please follow up with your patient regarding denial and next steps**

## 2025-07-11 DIAGNOSIS — E66.813 CLASS 3 SEVERE OBESITY DUE TO EXCESS CALORIES WITH SERIOUS COMORBIDITY AND BODY MASS INDEX (BMI) OF 50.0 TO 59.9 IN ADULT: ICD-10-CM

## 2025-07-11 DIAGNOSIS — K76.0 FATTY LIVER: ICD-10-CM

## 2025-07-11 DIAGNOSIS — K21.9 GASTROESOPHAGEAL REFLUX DISEASE WITHOUT ESOPHAGITIS: Primary | ICD-10-CM

## 2025-07-11 DIAGNOSIS — E06.3 HYPOTHYROIDISM DUE TO HASHIMOTO'S THYROIDITIS: ICD-10-CM

## 2025-07-13 ENCOUNTER — APPOINTMENT (OUTPATIENT)
Dept: LAB | Facility: HOSPITAL | Age: 55
End: 2025-07-13
Payer: COMMERCIAL

## 2025-07-13 DIAGNOSIS — K76.0 FATTY LIVER: ICD-10-CM

## 2025-07-13 DIAGNOSIS — K21.9 GASTROESOPHAGEAL REFLUX DISEASE WITHOUT ESOPHAGITIS: ICD-10-CM

## 2025-07-13 DIAGNOSIS — E06.3 HYPOTHYROIDISM DUE TO HASHIMOTO'S THYROIDITIS: ICD-10-CM

## 2025-07-13 DIAGNOSIS — E66.813 CLASS 3 SEVERE OBESITY DUE TO EXCESS CALORIES WITH SERIOUS COMORBIDITY AND BODY MASS INDEX (BMI) OF 50.0 TO 59.9 IN ADULT: ICD-10-CM

## 2025-07-13 LAB
ALBUMIN SERPL BCG-MCNC: 4.1 G/DL (ref 3.5–5)
ALP SERPL-CCNC: 80 U/L (ref 34–104)
ALT SERPL W P-5'-P-CCNC: 30 U/L (ref 7–52)
ANION GAP SERPL CALCULATED.3IONS-SCNC: 7 MMOL/L (ref 4–13)
AST SERPL W P-5'-P-CCNC: 21 U/L (ref 13–39)
BASOPHILS # BLD AUTO: 0.05 THOUSANDS/ÂΜL (ref 0–0.1)
BASOPHILS NFR BLD AUTO: 1 % (ref 0–1)
BILIRUB SERPL-MCNC: 0.71 MG/DL (ref 0.2–1)
BUN SERPL-MCNC: 18 MG/DL (ref 5–25)
CALCIUM SERPL-MCNC: 9.8 MG/DL (ref 8.4–10.2)
CHLORIDE SERPL-SCNC: 107 MMOL/L (ref 96–108)
CO2 SERPL-SCNC: 28 MMOL/L (ref 21–32)
CREAT SERPL-MCNC: 0.82 MG/DL (ref 0.6–1.3)
EOSINOPHIL # BLD AUTO: 0.37 THOUSAND/ÂΜL (ref 0–0.61)
EOSINOPHIL NFR BLD AUTO: 5 % (ref 0–6)
ERYTHROCYTE [DISTWIDTH] IN BLOOD BY AUTOMATED COUNT: 14.5 % (ref 11.6–15.1)
GFR SERPL CREATININE-BSD FRML MDRD: 80 ML/MIN/1.73SQ M
GLUCOSE P FAST SERPL-MCNC: 110 MG/DL (ref 65–99)
HCT VFR BLD AUTO: 44.3 % (ref 34.8–46.1)
HGB BLD-MCNC: 14.3 G/DL (ref 11.5–15.4)
IMM GRANULOCYTES # BLD AUTO: 0.03 THOUSAND/UL (ref 0–0.2)
IMM GRANULOCYTES NFR BLD AUTO: 0 % (ref 0–2)
LYMPHOCYTES # BLD AUTO: 3.62 THOUSANDS/ÂΜL (ref 0.6–4.47)
LYMPHOCYTES NFR BLD AUTO: 45 % (ref 14–44)
MCH RBC QN AUTO: 27.3 PG (ref 26.8–34.3)
MCHC RBC AUTO-ENTMCNC: 32.3 G/DL (ref 31.4–37.4)
MCV RBC AUTO: 85 FL (ref 82–98)
MONOCYTES # BLD AUTO: 0.6 THOUSAND/ÂΜL (ref 0.17–1.22)
MONOCYTES NFR BLD AUTO: 7 % (ref 4–12)
NEUTROPHILS # BLD AUTO: 3.44 THOUSANDS/ÂΜL (ref 1.85–7.62)
NEUTS SEG NFR BLD AUTO: 42 % (ref 43–75)
NRBC BLD AUTO-RTO: 0 /100 WBCS
PLATELET # BLD AUTO: 228 THOUSANDS/UL (ref 149–390)
PMV BLD AUTO: 9.9 FL (ref 8.9–12.7)
POTASSIUM SERPL-SCNC: 4.6 MMOL/L (ref 3.5–5.3)
PROT SERPL-MCNC: 7.5 G/DL (ref 6.4–8.4)
RBC # BLD AUTO: 5.24 MILLION/UL (ref 3.81–5.12)
SODIUM SERPL-SCNC: 142 MMOL/L (ref 135–147)
TSH SERPL DL<=0.05 MIU/L-ACNC: 2.44 UIU/ML (ref 0.45–4.5)
WBC # BLD AUTO: 8.11 THOUSAND/UL (ref 4.31–10.16)

## 2025-07-13 PROCEDURE — 84443 ASSAY THYROID STIM HORMONE: CPT

## 2025-07-13 PROCEDURE — 85025 COMPLETE CBC W/AUTO DIFF WBC: CPT

## 2025-07-13 PROCEDURE — 36415 COLL VENOUS BLD VENIPUNCTURE: CPT

## 2025-07-13 PROCEDURE — 80053 COMPREHEN METABOLIC PANEL: CPT

## 2025-07-14 ENCOUNTER — OFFICE VISIT (OUTPATIENT)
Dept: FAMILY MEDICINE CLINIC | Facility: CLINIC | Age: 55
End: 2025-07-14
Payer: COMMERCIAL

## 2025-07-14 VITALS
BODY MASS INDEX: 51.91 KG/M2 | TEMPERATURE: 98 F | RESPIRATION RATE: 17 BRPM | WEIGHT: 293 LBS | OXYGEN SATURATION: 95 % | SYSTOLIC BLOOD PRESSURE: 126 MMHG | HEART RATE: 77 BPM | DIASTOLIC BLOOD PRESSURE: 80 MMHG | HEIGHT: 63 IN

## 2025-07-14 DIAGNOSIS — E06.3 HYPOTHYROIDISM DUE TO HASHIMOTO'S THYROIDITIS: ICD-10-CM

## 2025-07-14 DIAGNOSIS — Z23 ENCOUNTER FOR IMMUNIZATION: ICD-10-CM

## 2025-07-14 DIAGNOSIS — R73.01 IMPAIRED FASTING GLUCOSE: Primary | ICD-10-CM

## 2025-07-14 DIAGNOSIS — J45.20 MILD INTERMITTENT ASTHMA WITHOUT COMPLICATION: ICD-10-CM

## 2025-07-14 DIAGNOSIS — K21.9 GASTROESOPHAGEAL REFLUX DISEASE WITHOUT ESOPHAGITIS: ICD-10-CM

## 2025-07-14 PROBLEM — M54.6 CHRONIC LEFT-SIDED THORACIC BACK PAIN: Status: RESOLVED | Noted: 2024-01-25 | Resolved: 2025-07-14

## 2025-07-14 PROBLEM — M67.40 GANGLION OF TENDON SHEATH: Status: RESOLVED | Noted: 2021-05-19 | Resolved: 2025-07-14

## 2025-07-14 PROBLEM — S29.011A STRAIN OF LEFT PECTORALIS MUSCLE: Status: RESOLVED | Noted: 2024-07-28 | Resolved: 2025-07-14

## 2025-07-14 PROBLEM — L24.89 IRRITANT CONTACT DERMATITIS DUE TO OTHER AGENTS: Status: RESOLVED | Noted: 2023-03-11 | Resolved: 2025-07-14

## 2025-07-14 PROBLEM — Z85.828 HISTORY OF MALIGNANT NEOPLASM OF SKIN: Status: RESOLVED | Noted: 2022-12-20 | Resolved: 2025-07-14

## 2025-07-14 PROBLEM — G89.29 CHRONIC LEFT-SIDED THORACIC BACK PAIN: Status: RESOLVED | Noted: 2024-01-25 | Resolved: 2025-07-14

## 2025-07-14 PROBLEM — M54.9 COSTOVERTEBRAL ANGLE PAIN: Status: RESOLVED | Noted: 2021-08-04 | Resolved: 2025-07-14

## 2025-07-14 PROBLEM — M25.512 ACUTE PAIN OF LEFT SHOULDER: Status: RESOLVED | Noted: 2024-07-28 | Resolved: 2025-07-14

## 2025-07-14 PROCEDURE — 90471 IMMUNIZATION ADMIN: CPT

## 2025-07-14 PROCEDURE — 99214 OFFICE O/P EST MOD 30 MIN: CPT | Performed by: NURSE PRACTITIONER

## 2025-07-14 PROCEDURE — 90715 TDAP VACCINE 7 YRS/> IM: CPT

## 2025-07-14 NOTE — PROGRESS NOTES
FAMILY PRACTICE OFFICE VISIT       NAME: Екатерина Camacho  AGE: 55 y.o. SEX: female       : 1970        MRN: [id number]    DATE: 2025  TIME: 10:19 AM    Assessment and Plan   1. Impaired fasting glucose  Assessment & Plan:  Check labs    Orders:  -     Hemoglobin A1C; Future; Expected date: Collect anytime  2. Mild intermittent asthma without complication  Assessment & Plan:  Stable  Meds prn    3. Gastroesophageal reflux disease without esophagitis  Assessment & Plan:  Stable  Cont meds    4. Hypothyroidism due to Hashimoto's thyroiditis  Assessment & Plan:  Stable  Cont meds    5. Encounter for immunization  -     TDAP VACCINE GREATER THAN OR EQUAL TO 8YO IM       There are no Patient Instructions on file for this visit.        Chief Complaint     Chief Complaint   Patient presents with    Follow-up     Pt being seen for aq follow up to  and hand surgery       History of Present Illness   Екатерина Camacho is a 55 y.o.-year-old female who is here for f/u to chronic medical conditions  She has tried and failed a formal weight loss program for the past 6 months  Has tried weight watchers and noom  Has been working with me for a formal weight loss program  Exercising 150 min per week  [facility]  Goes to PT for hand  Dr thomas at [facility] hand [facility]  Has tried and failed phentermine with no success  Feels good  Wegovy not approved yet  Daughter is due nov 13 with a boy      Review of Systems   Review of Systems   Constitutional:  Negative for fatigue and fever.   HENT:  Negative for congestion, postnasal drip and rhinorrhea.    Eyes:  Negative for photophobia and visual disturbance.   Respiratory:  Negative for cough, shortness of breath and wheezing.    Cardiovascular:  Negative for chest pain and palpitations.   Gastrointestinal:  Negative for constipation, diarrhea, nausea and vomiting.   Genitourinary:  Negative for dysuria, frequency and hematuria.   Musculoskeletal:  Negative for  arthralgias and myalgias.   Skin:  Negative for rash.   Neurological:  Negative for dizziness, light-headedness and headaches.   Hematological:  Negative for adenopathy.   Psychiatric/Behavioral:  Negative for dysphoric mood and sleep disturbance. The patient is not nervous/anxious.        Active Problem List   Problem List[1]      Past Medical History:  Past Medical History[2]    Past Surgical History:  Past Surgical History[3]    Family History:  Family History[4]    Social History:  Social History     Socioeconomic History    Marital status:      Spouse name: Not on file    Number of children: Not on file    Years of education: Not on file    Highest education level: Not on file   Occupational History    Not on file   Tobacco Use    Smoking status: Never     Passive exposure: Never    Smokeless tobacco: Never   Vaping Use    Vaping status: Never Used   Substance and Sexual Activity    Alcohol use: Yes     Comment: very rare (1 drink once per month - if that)    Drug use: No    Sexual activity: Not on file   Other Topics Concern    Not on file   Social History Narrative    DAILY COFFEE CONSUMPTION     Social Drivers of Health     Financial Resource Strain: Not on file   Food Insecurity: Not on file   Transportation Needs: Not on file   Physical Activity: Not on file   Stress: Not on file   Social Connections: Not on file   Intimate Partner Violence: Not on file   Housing Stability: Not on file       Objective     Vitals:    07/14/25 0940   BP: 126/80   Pulse: 77   Resp: 17   Temp: 98 °F (36.7 °C)   SpO2: 95%     Wt Readings from Last 3 Encounters:   07/14/25 135 kg (297 lb)   05/15/25 134 kg (294 lb 6.4 oz)   04/30/25 132 kg (291 lb)       Physical Exam  Vitals and nursing note reviewed.   Constitutional:       Appearance: Normal appearance. She is obese.   HENT:      Head: Normocephalic and atraumatic.      Right Ear: Tympanic membrane, ear canal and external ear normal.      Left Ear: Tympanic membrane,  "ear canal and external ear normal.      Nose: Nose normal.      Mouth/Throat:      Mouth: Mucous membranes are moist.     Eyes:      Conjunctiva/sclera: Conjunctivae normal.       Cardiovascular:      Rate and Rhythm: Normal rate and regular rhythm.      Heart sounds: Normal heart sounds.   Pulmonary:      Effort: Pulmonary effort is normal.      Breath sounds: Normal breath sounds.   Abdominal:      Palpations: Abdomen is soft.     Musculoskeletal:         General: Normal range of motion.      Cervical back: Normal range of motion and neck supple.     Skin:     General: Skin is warm and dry.      Capillary Refill: Capillary refill takes less than 2 seconds.     Neurological:      General: No focal deficit present.      Mental Status: She is alert and oriented to person, place, and time.     Psychiatric:         Mood and Affect: Mood normal.         Behavior: Behavior normal.         Thought Content: Thought content normal.         Judgment: Judgment normal.         Pertinent Laboratory/Diagnostic Studies:  Lab Results   Component Value Date    GLUCOSE 106 12/12/2015    BUN 18 07/13/2025    CREATININE 0.82 07/13/2025    CALCIUM 9.8 07/13/2025     12/12/2015    K 4.6 07/13/2025    CO2 28 07/13/2025     07/13/2025     Lab Results   Component Value Date    ALT 30 07/13/2025    AST 21 07/13/2025    ALKPHOS 80 07/13/2025    BILITOT 0.52 12/12/2015       Lab Results   Component Value Date    WBC 8.11 07/13/2025    HGB 14.3 07/13/2025    HCT 44.3 07/13/2025    MCV 85 07/13/2025     07/13/2025       No results found for: \"TSH\"    Lab Results   Component Value Date    CHOL 184 12/12/2015     Lab Results   Component Value Date    TRIG 162 (H) 04/17/2025     Lab Results   Component Value Date    HDL 55 04/17/2025     Lab Results   Component Value Date    LDLCALC 83 04/17/2025     Lab Results   Component Value Date    HGBA1C 6.2 (H) 07/28/2014       Results for orders placed or performed in visit on 07/13/25 "   TSH, 3rd generation with Free T4 reflex   Result Value Ref Range    TSH 3RD GENERATION 2.437 0.450 - 4.500 uIU/mL   Comprehensive metabolic panel   Result Value Ref Range    Sodium 142 135 - 147 mmol/L    Potassium 4.6 3.5 - 5.3 mmol/L    Chloride 107 96 - 108 mmol/L    CO2 28 21 - 32 mmol/L    ANION GAP 7 4 - 13 mmol/L    BUN 18 5 - 25 mg/dL    Creatinine 0.82 0.60 - 1.30 mg/dL    Glucose, Fasting 110 (H) 65 - 99 mg/dL    Calcium 9.8 8.4 - 10.2 mg/dL    AST 21 13 - 39 U/L    ALT 30 7 - 52 U/L    Alkaline Phosphatase 80 34 - 104 U/L    Total Protein 7.5 6.4 - 8.4 g/dL    Albumin 4.1 3.5 - 5.0 g/dL    Total Bilirubin 0.71 0.20 - 1.00 mg/dL    eGFR 80 ml/min/1.73sq m   CBC and differential   Result Value Ref Range    WBC 8.11 4.31 - 10.16 Thousand/uL    RBC 5.24 (H) 3.81 - 5.12 Million/uL    Hemoglobin 14.3 11.5 - 15.4 g/dL    Hematocrit 44.3 34.8 - 46.1 %    MCV 85 82 - 98 fL    MCH 27.3 26.8 - 34.3 pg    MCHC 32.3 31.4 - 37.4 g/dL    RDW 14.5 11.6 - 15.1 %    MPV 9.9 8.9 - 12.7 fL    Platelets 228 149 - 390 Thousands/uL    nRBC 0 /100 WBCs    Segmented % 42 (L) 43 - 75 %    Immature Grans % 0 0 - 2 %    Lymphocytes % 45 (H) 14 - 44 %    Monocytes % 7 4 - 12 %    Eosinophils Relative 5 0 - 6 %    Basophils Relative 1 0 - 1 %    Absolute Neutrophils 3.44 1.85 - 7.62 Thousands/µL    Absolute Immature Grans 0.03 0.00 - 0.20 Thousand/uL    Absolute Lymphocytes 3.62 0.60 - 4.47 Thousands/µL    Absolute Monocytes 0.60 0.17 - 1.22 Thousand/µL    Eosinophils Absolute 0.37 0.00 - 0.61 Thousand/µL    Basophils Absolute 0.05 0.00 - 0.10 Thousands/µL     *Note: Due to a large number of results and/or encounters for the requested time period, some results have not been displayed. A complete set of results can be found in Results Review.       Orders Placed This Encounter   Procedures    TDAP VACCINE GREATER THAN OR EQUAL TO 6YO IM    Hemoglobin A1C       ALLERGIES:  Allergies[5]    Current Medications   Current  Medications[6]      Health Maintenance     Health Maintenance   Topic Date Due    Pneumococcal Vaccine: 50+ Years (1 of 2 - PCV) Never done    Influenza Vaccine (1) 09/01/2025    COVID-19 Vaccine (2 - 2024-25 season) 08/15/2025 (Originally 9/1/2024)    Zoster Vaccine (1 of 2) 05/15/2026 (Originally 6/16/2020)    Annual Physical  10/14/2025    Breast Cancer Screening: Mammogram  04/22/2026    Depression Screening  05/15/2026    Colorectal Cancer Screening  08/01/2026    DTaP,Tdap,and Td Vaccines (3 - Td or Tdap) 07/14/2035    HIV Screening  Completed    Hepatitis C Screening  Completed    Meningococcal B Vaccine  Aged Out    RSV Vaccine age 0-20 Months  Aged Out    HIB Vaccine  Aged Out    IPV Vaccine  Aged Out    Hepatitis A Vaccine  Aged Out    Meningococcal ACWY Vaccine  Aged Out    HPV Vaccine  Aged Out     Immunization History   Administered Date(s) Administered    COVID-19 J&J (CLIPPATE) vaccine 0.5 mL 03/19/2021    INFLUENZA 12/22/2015, 10/17/2016, 12/04/2017, 11/12/2018    Influenza Quadrivalent Preservative Free 3 years and older IM 12/31/2014, 10/17/2016, 12/04/2017    Influenza Quadrivalent, 6-35 Months IM 12/30/2014, 12/22/2015    Influenza Recombinant Preservative Free Im 10/14/2024    Influenza, injectable, quadrivalent, preservative free 0.5 mL 10/31/2019    Influenza, recombinant, quadrivalent,injectable, preservative free 11/12/2018, 12/08/2020, 12/09/2021, 11/16/2022    Influenza, seasonal, injectable 10/20/2012, 12/08/2021, 12/08/2021    Tdap 08/30/2012, 07/14/2025          THEO Lewis         [1]   Patient Active Problem List  Diagnosis    Herpes simplex type 1 infection    Chronic bilateral low back pain without sciatica    Mild intermittent asthma    Gastroesophageal reflux disease without esophagitis    Anxiety    Class 3 severe obesity due to excess calories with serious comorbidity and body mass index (BMI) of 50.0 to 59.9 in adult    Family history of colon cancer    Hypothyroidism  "due to Hashimoto's thyroiditis    Psoriasis    Herniated thoracic disc without myelopathy    Cold sore    History of COVID-19    History of hysterectomy    Enlarged liver    Fatty liver    Impaired fasting glucose   [2]   Past Medical History:  Diagnosis Date    Acute pain of right shoulder     [id number] RESOLVED//seen by pain management    Allergic     Anesthesia     \"sometimes wakes up before procedure done ie, with MRI and EGD\"    Anxiety     [id number]  LAST ASSESSED    Arthritis     left knee    Asthma     allergy induced    Bulging of intervertebral disc between L4 and L5     Bursitis of right knee     better now    Depression     Ear infection     left PCP placed pt on antibiotic and ear drops    Elevated AST (SGOT)     [id number] RESOLVED    GERD (gastroesophageal reflux disease)     Goiter     [id number] RESOLVED//pt denies states has \"inflamed thyroid\"    Hiatal hernia     Hypothyroidism     [id number] RESOLVED//not on meds    Lipoma     left scapular area    Lump of axilla, right     [id number] RESOLVED marker clip in rt breast    Obstructive sleep apnea     [id number] RESOLVED    Psoriasis     Rash     under bilat breasts and folds of abdomen,, pt reports Dr Haverly aware and dermatologist is treating/pt using ketoconazole cream as directed    Shortness of breath     \"with exertion\"    Use of cane as ambulatory aid     occas   [3]   Past Surgical History:  Procedure Laterality Date    CARPAL TUNNEL RELEASE Bilateral     COLONOSCOPY      EGD      HYSTERECTOMY  12/30/2013    WITH ENDOMETERIAL ABLATION    KNEE SURGERY Left     NERVE BLOCK      PERIPH NERVE BLOCK WRIST MEDIAN W/LOCAL STEROID INFILTRAIT    PLANTAR FASCIA RELEASE Bilateral 2009, 2020 R    June 2020 on R    AL COLONOSCOPY FLX DX W/COLLJ SPEC WHEN PFRMD N/A 08/02/2018    Procedure: EGD AND COLONOSCOPY;  Surgeon: Mulugeta Gill MD;  Location: [facility];  Service: Gastroenterology    AL EXC B9 LESION MRGN XCP SK TG T/A/L 0.5 CM/< N/A 03/02/2023    " Procedure: EXCISION WIDE LESION LEFT UPPER BACK;  Surgeon: Maya Chavez MD;  Location: [facility];  Service: General    NH FASCIECTOMY PLANTAR FASCIA PARTIAL SPX Right 06/09/2020    Procedure: RELEASE FASCIA PLANTAR/FASCIOTOMY ENDOSCOPIC (EPF);  Surgeon: Viraj Harkins DPM;  Location: [facility];  Service: Podiatry    NH RELEASE TARSAL TUNNEL Right 08/11/2020    Procedure: RELEASE TARSAL TUNNEL;  Surgeon: Viraj Harkins DPM;  Location: [facility];  Service: Podiatry    TONSILLECTOMY AND ADENOIDECTOMY     [4]   Family History  Problem Relation Name Age of Onset    Cirrhosis Mother      Hypertension Father      Nephrolithiasis Sister      Fibroids Sister      Fibroids Sister      Fibroids Sister      Anxiety disorder Daughter      Asthma Daughter      Ulcerative colitis Son      Heart disease Maternal Grandfather Jonatan Kennedy     Ovarian cancer Paternal Grandmother Tyrel Camacho     Colon cancer Paternal Grandmother Tyrel Camacho     Uterine cancer Maternal Aunt Emma Carmona     Obesity Family     [5]   Allergies  Allergen Reactions    Chlorhexidine Gluconate Hives    Meloxicam GI Bleeding    Chlorhexidine Rash   [6]   Current Outpatient Medications   Medication Sig Dispense Refill    albuterol (2.5 mg/3 mL) 0.083 % nebulizer solution Take 3 mL (2.5 mg total) by nebulization every 4 (four) hours as needed for wheezing or shortness of breath 75 mL 1    budesonide-formoterol (SYMBICORT) 80-4.5 MCG/ACT inhaler Inhale 2 puffs 2 (two) times a day 30.6 g 3    Guselkumab (Tremfya) 100 MG/ML SOAJ Inject under the skin      levothyroxine (Synthroid) 150 mcg tablet Take 1 tablet (150 mcg total) by mouth daily 100 tablet 3    montelukast (SINGULAIR) 10 mg tablet TAKE 1 TABLET BY MOUTH DAILY AT BEDTIME 90 tablet 1    multivitamin (THERAGRAN) TABS Take 1 tablet by mouth in the morning. Bio- Chem 50 .      pantoprazole (PROTONIX) 40 mg tablet Take 1 tablet (40 mg total) by mouth daily      phentermine (ADIPEX-P) 37.5 MG tablet  Take 1 tablet (37.5 mg total) by mouth in the morning 30 tablet 3    Proventil  (90 Base) MCG/ACT inhaler Inhale 2 puffs every 4 (four) hours as needed for wheezing 20.1 g 5    Semaglutide-Weight Management (WEGOVY) 0.25 MG/0.5ML Inject 0.5 mL (0.25 mg total) under the skin once a week 2 mL 1    valACYclovir (VALTREX) 500 mg tablet Take 1 tablet (500 mg total) by mouth daily 90 tablet 1     No current facility-administered medications for this visit.

## 2025-07-15 ENCOUNTER — APPOINTMENT (OUTPATIENT)
Dept: LAB | Facility: HOSPITAL | Age: 55
End: 2025-07-15
Attending: NURSE PRACTITIONER
Payer: COMMERCIAL

## 2025-07-15 DIAGNOSIS — R73.01 IMPAIRED FASTING GLUCOSE: ICD-10-CM

## 2025-07-15 LAB
EST. AVERAGE GLUCOSE BLD GHB EST-MCNC: 128 MG/DL
HBA1C MFR BLD: 6.1 %
TSH SERPL DL<=0.05 MIU/L-ACNC: 1.79 UIU/ML (ref 0.45–4.5)

## 2025-07-15 PROCEDURE — 83036 HEMOGLOBIN GLYCOSYLATED A1C: CPT
